# Patient Record
Sex: MALE | Race: WHITE | NOT HISPANIC OR LATINO | Employment: OTHER | URBAN - METROPOLITAN AREA
[De-identification: names, ages, dates, MRNs, and addresses within clinical notes are randomized per-mention and may not be internally consistent; named-entity substitution may affect disease eponyms.]

---

## 2017-04-25 ENCOUNTER — RECORDS - HEALTHEAST (OUTPATIENT)
Dept: LAB | Facility: CLINIC | Age: 82
End: 2017-04-25

## 2017-04-25 LAB
CHOLEST SERPL-MCNC: 163 MG/DL
FASTING STATUS PATIENT QL REPORTED: YES
HDLC SERPL-MCNC: 36 MG/DL
LDLC SERPL CALC-MCNC: 87 MG/DL
TRIGL SERPL-MCNC: 202 MG/DL

## 2017-06-06 ENCOUNTER — OFFICE VISIT - HEALTHEAST (OUTPATIENT)
Dept: RHEUMATOLOGY | Facility: CLINIC | Age: 82
End: 2017-06-06

## 2017-06-06 ENCOUNTER — RECORDS - HEALTHEAST (OUTPATIENT)
Dept: GENERAL RADIOLOGY | Age: 82
End: 2017-06-06

## 2017-06-06 DIAGNOSIS — L40.8 OTHER PSORIASIS: ICD-10-CM

## 2017-06-06 DIAGNOSIS — M15.0 PRIMARY OSTEOARTHRITIS INVOLVING MULTIPLE JOINTS: ICD-10-CM

## 2017-06-06 DIAGNOSIS — M25.50 POLYARTHRALGIA: ICD-10-CM

## 2017-06-06 DIAGNOSIS — M15.0 PRIMARY GENERALIZED (OSTEO)ARTHRITIS: ICD-10-CM

## 2017-06-07 LAB — HCV AB SERPL QL IA: NEGATIVE

## 2017-07-07 ENCOUNTER — AMBULATORY - HEALTHEAST (OUTPATIENT)
Dept: CARDIOLOGY | Facility: CLINIC | Age: 82
End: 2017-07-07

## 2017-07-12 ENCOUNTER — AMBULATORY - HEALTHEAST (OUTPATIENT)
Dept: CARDIOLOGY | Facility: CLINIC | Age: 82
End: 2017-07-12

## 2017-07-12 ENCOUNTER — OFFICE VISIT - HEALTHEAST (OUTPATIENT)
Dept: CARDIOLOGY | Facility: CLINIC | Age: 82
End: 2017-07-12

## 2017-07-12 DIAGNOSIS — I50.31 ACUTE DIASTOLIC CONGESTIVE HEART FAILURE (H): ICD-10-CM

## 2017-07-12 DIAGNOSIS — I48.21 PERMANENT ATRIAL FIBRILLATION (H): ICD-10-CM

## 2017-07-12 DIAGNOSIS — I10 ESSENTIAL HYPERTENSION WITH GOAL BLOOD PRESSURE LESS THAN 140/90: ICD-10-CM

## 2017-07-12 ASSESSMENT — MIFFLIN-ST. JEOR: SCORE: 1538.97

## 2017-09-06 ENCOUNTER — OFFICE VISIT - HEALTHEAST (OUTPATIENT)
Dept: RHEUMATOLOGY | Facility: CLINIC | Age: 82
End: 2017-09-06

## 2017-09-06 DIAGNOSIS — M15.0 PRIMARY OSTEOARTHRITIS INVOLVING MULTIPLE JOINTS: ICD-10-CM

## 2017-09-06 DIAGNOSIS — M25.50 POLYARTHRALGIA: ICD-10-CM

## 2017-09-06 DIAGNOSIS — L40.8 OTHER PSORIASIS: ICD-10-CM

## 2017-09-06 ASSESSMENT — MIFFLIN-ST. JEOR: SCORE: 1538.97

## 2017-09-27 ENCOUNTER — RECORDS - HEALTHEAST (OUTPATIENT)
Dept: ADMINISTRATIVE | Facility: OTHER | Age: 82
End: 2017-09-27

## 2017-09-28 ENCOUNTER — RECORDS - HEALTHEAST (OUTPATIENT)
Dept: ADMINISTRATIVE | Facility: OTHER | Age: 82
End: 2017-09-28

## 2017-10-05 ENCOUNTER — RECORDS - HEALTHEAST (OUTPATIENT)
Dept: ADMINISTRATIVE | Facility: OTHER | Age: 82
End: 2017-10-05

## 2017-10-05 ENCOUNTER — AMBULATORY - HEALTHEAST (OUTPATIENT)
Dept: SURGERY | Facility: CLINIC | Age: 82
End: 2017-10-05

## 2017-10-12 ENCOUNTER — OFFICE VISIT - HEALTHEAST (OUTPATIENT)
Dept: SURGERY | Facility: CLINIC | Age: 82
End: 2017-10-12

## 2017-10-12 DIAGNOSIS — D17.1 LIPOMA OF TORSO: ICD-10-CM

## 2017-10-12 ASSESSMENT — MIFFLIN-ST. JEOR: SCORE: 1523.54

## 2017-12-13 ENCOUNTER — RECORDS - HEALTHEAST (OUTPATIENT)
Dept: LAB | Facility: CLINIC | Age: 82
End: 2017-12-13

## 2017-12-13 LAB
CHOLEST SERPL-MCNC: 146 MG/DL
FASTING STATUS PATIENT QL REPORTED: ABNORMAL
HDLC SERPL-MCNC: 38 MG/DL
LDLC SERPL CALC-MCNC: 79 MG/DL
TRIGL SERPL-MCNC: 147 MG/DL

## 2018-01-04 ENCOUNTER — RECORDS - HEALTHEAST (OUTPATIENT)
Dept: LAB | Facility: CLINIC | Age: 83
End: 2018-01-04

## 2018-01-04 LAB
ANION GAP SERPL CALCULATED.3IONS-SCNC: 11 MMOL/L (ref 5–18)
BUN SERPL-MCNC: 13 MG/DL (ref 8–28)
CALCIUM SERPL-MCNC: 9.2 MG/DL (ref 8.5–10.5)
CHLORIDE BLD-SCNC: 103 MMOL/L (ref 98–107)
CO2 SERPL-SCNC: 28 MMOL/L (ref 22–31)
CREAT SERPL-MCNC: 0.8 MG/DL (ref 0.7–1.3)
GFR SERPL CREATININE-BSD FRML MDRD: >60 ML/MIN/1.73M2
GLUCOSE BLD-MCNC: 84 MG/DL (ref 70–125)
POTASSIUM BLD-SCNC: 4.1 MMOL/L (ref 3.5–5)
SODIUM SERPL-SCNC: 142 MMOL/L (ref 136–145)

## 2018-03-06 ENCOUNTER — OFFICE VISIT - HEALTHEAST (OUTPATIENT)
Dept: RHEUMATOLOGY | Facility: CLINIC | Age: 83
End: 2018-03-06

## 2018-03-06 DIAGNOSIS — M25.50 POLYARTHRALGIA: ICD-10-CM

## 2018-03-06 DIAGNOSIS — M15.0 PRIMARY OSTEOARTHRITIS INVOLVING MULTIPLE JOINTS: ICD-10-CM

## 2018-03-06 ASSESSMENT — MIFFLIN-ST. JEOR: SCORE: 1498.14

## 2018-07-11 ENCOUNTER — RECORDS - HEALTHEAST (OUTPATIENT)
Dept: LAB | Facility: CLINIC | Age: 83
End: 2018-07-11

## 2018-07-11 LAB
ALBUMIN SERPL-MCNC: 3.4 G/DL (ref 3.5–5)
ALP SERPL-CCNC: 83 U/L (ref 45–120)
ALT SERPL W P-5'-P-CCNC: <9 U/L (ref 0–45)
ANION GAP SERPL CALCULATED.3IONS-SCNC: 8 MMOL/L (ref 5–18)
AST SERPL W P-5'-P-CCNC: 15 U/L (ref 0–40)
BILIRUB SERPL-MCNC: 0.4 MG/DL (ref 0–1)
BUN SERPL-MCNC: 18 MG/DL (ref 8–28)
CALCIUM SERPL-MCNC: 9.1 MG/DL (ref 8.5–10.5)
CHLORIDE BLD-SCNC: 108 MMOL/L (ref 98–107)
CHOLEST SERPL-MCNC: 131 MG/DL
CO2 SERPL-SCNC: 25 MMOL/L (ref 22–31)
CREAT SERPL-MCNC: 0.91 MG/DL (ref 0.7–1.3)
FASTING STATUS PATIENT QL REPORTED: ABNORMAL
GFR SERPL CREATININE-BSD FRML MDRD: >60 ML/MIN/1.73M2
GLUCOSE BLD-MCNC: 104 MG/DL (ref 70–125)
HDLC SERPL-MCNC: 36 MG/DL
LDLC SERPL CALC-MCNC: 65 MG/DL
POTASSIUM BLD-SCNC: 4.5 MMOL/L (ref 3.5–5)
PROT SERPL-MCNC: 6.7 G/DL (ref 6–8)
SODIUM SERPL-SCNC: 141 MMOL/L (ref 136–145)
TRIGL SERPL-MCNC: 148 MG/DL

## 2018-08-10 ENCOUNTER — COMMUNICATION - HEALTHEAST (OUTPATIENT)
Dept: ADMINISTRATIVE | Facility: CLINIC | Age: 83
End: 2018-08-10

## 2018-08-30 ENCOUNTER — RECORDS - HEALTHEAST (OUTPATIENT)
Dept: ADMINISTRATIVE | Facility: OTHER | Age: 83
End: 2018-08-30

## 2018-08-30 ENCOUNTER — AMBULATORY - HEALTHEAST (OUTPATIENT)
Dept: CARDIOLOGY | Facility: CLINIC | Age: 83
End: 2018-08-30

## 2018-09-05 ENCOUNTER — OFFICE VISIT - HEALTHEAST (OUTPATIENT)
Dept: CARDIOLOGY | Facility: CLINIC | Age: 83
End: 2018-09-05

## 2018-09-05 DIAGNOSIS — I10 HYPERTENSION, ESSENTIAL: ICD-10-CM

## 2018-09-05 DIAGNOSIS — R42 DIZZINESS: ICD-10-CM

## 2018-09-05 DIAGNOSIS — I48.21 PERMANENT ATRIAL FIBRILLATION (H): ICD-10-CM

## 2018-09-05 DIAGNOSIS — I50.31 ACUTE DIASTOLIC CONGESTIVE HEART FAILURE (H): ICD-10-CM

## 2018-09-05 ASSESSMENT — MIFFLIN-ST. JEOR: SCORE: 1507.21

## 2019-02-27 ENCOUNTER — RECORDS - HEALTHEAST (OUTPATIENT)
Dept: LAB | Facility: CLINIC | Age: 84
End: 2019-02-27

## 2019-02-27 LAB
ANION GAP SERPL CALCULATED.3IONS-SCNC: 7 MMOL/L (ref 5–18)
BUN SERPL-MCNC: 15 MG/DL (ref 8–28)
CALCIUM SERPL-MCNC: 9.5 MG/DL (ref 8.5–10.5)
CHLORIDE BLD-SCNC: 103 MMOL/L (ref 98–107)
CHOLEST SERPL-MCNC: 132 MG/DL
CO2 SERPL-SCNC: 28 MMOL/L (ref 22–31)
CREAT SERPL-MCNC: 1 MG/DL (ref 0.7–1.3)
CREAT UR-MCNC: 76.5 MG/DL
FASTING STATUS PATIENT QL REPORTED: ABNORMAL
GFR SERPL CREATININE-BSD FRML MDRD: >60 ML/MIN/1.73M2
GLUCOSE BLD-MCNC: 89 MG/DL (ref 70–125)
HDLC SERPL-MCNC: 37 MG/DL
LDLC SERPL CALC-MCNC: 74 MG/DL
MAGNESIUM SERPL-MCNC: 2.2 MG/DL (ref 1.8–2.6)
MICROALBUMIN UR-MCNC: 2.05 MG/DL (ref 0–1.99)
MICROALBUMIN/CREAT UR: 26.8 MG/G
POTASSIUM BLD-SCNC: 4.6 MMOL/L (ref 3.5–5)
SODIUM SERPL-SCNC: 138 MMOL/L (ref 136–145)
TRIGL SERPL-MCNC: 107 MG/DL

## 2019-10-02 ENCOUNTER — RECORDS - HEALTHEAST (OUTPATIENT)
Dept: LAB | Facility: CLINIC | Age: 84
End: 2019-10-02

## 2019-10-02 LAB
ANION GAP SERPL CALCULATED.3IONS-SCNC: 5 MMOL/L (ref 5–18)
BUN SERPL-MCNC: 16 MG/DL (ref 8–28)
CALCIUM SERPL-MCNC: 9.7 MG/DL (ref 8.5–10.5)
CHLORIDE BLD-SCNC: 105 MMOL/L (ref 98–107)
CHOLEST SERPL-MCNC: 148 MG/DL
CO2 SERPL-SCNC: 29 MMOL/L (ref 22–31)
CREAT SERPL-MCNC: 0.94 MG/DL (ref 0.7–1.3)
FASTING STATUS PATIENT QL REPORTED: NORMAL
GFR SERPL CREATININE-BSD FRML MDRD: >60 ML/MIN/1.73M2
GLUCOSE BLD-MCNC: 84 MG/DL (ref 70–125)
HDLC SERPL-MCNC: 42 MG/DL
LDLC SERPL CALC-MCNC: 76 MG/DL
POTASSIUM BLD-SCNC: 4.9 MMOL/L (ref 3.5–5)
SODIUM SERPL-SCNC: 139 MMOL/L (ref 136–145)
TRIGL SERPL-MCNC: 149 MG/DL

## 2020-05-21 ENCOUNTER — RECORDS - HEALTHEAST (OUTPATIENT)
Dept: LAB | Facility: CLINIC | Age: 85
End: 2020-05-21

## 2020-05-21 LAB
ANION GAP SERPL CALCULATED.3IONS-SCNC: 9 MMOL/L (ref 5–18)
BUN SERPL-MCNC: 16 MG/DL (ref 8–28)
CALCIUM SERPL-MCNC: 9.2 MG/DL (ref 8.5–10.5)
CHLORIDE BLD-SCNC: 105 MMOL/L (ref 98–107)
CHOLEST SERPL-MCNC: 139 MG/DL
CO2 SERPL-SCNC: 26 MMOL/L (ref 22–31)
CREAT SERPL-MCNC: 0.94 MG/DL (ref 0.7–1.3)
FASTING STATUS PATIENT QL REPORTED: ABNORMAL
GFR SERPL CREATININE-BSD FRML MDRD: >60 ML/MIN/1.73M2
GLUCOSE BLD-MCNC: 82 MG/DL (ref 70–125)
HDLC SERPL-MCNC: 39 MG/DL
LDLC SERPL CALC-MCNC: 75 MG/DL
POTASSIUM BLD-SCNC: 4.5 MMOL/L (ref 3.5–5)
SODIUM SERPL-SCNC: 140 MMOL/L (ref 136–145)
TRIGL SERPL-MCNC: 127 MG/DL

## 2020-12-22 ENCOUNTER — RECORDS - HEALTHEAST (OUTPATIENT)
Dept: LAB | Facility: CLINIC | Age: 85
End: 2020-12-22

## 2020-12-22 LAB
ANION GAP SERPL CALCULATED.3IONS-SCNC: 8 MMOL/L (ref 5–18)
BUN SERPL-MCNC: 14 MG/DL (ref 8–28)
CALCIUM SERPL-MCNC: 8.8 MG/DL (ref 8.5–10.5)
CHLORIDE BLD-SCNC: 106 MMOL/L (ref 98–107)
CHOLEST SERPL-MCNC: 139 MG/DL
CO2 SERPL-SCNC: 27 MMOL/L (ref 22–31)
CREAT SERPL-MCNC: 0.77 MG/DL (ref 0.7–1.3)
CREAT UR-MCNC: 128.3 MG/DL
FASTING STATUS PATIENT QL REPORTED: ABNORMAL
GFR SERPL CREATININE-BSD FRML MDRD: >60 ML/MIN/1.73M2
GLUCOSE BLD-MCNC: 92 MG/DL (ref 70–125)
HDLC SERPL-MCNC: 37 MG/DL
LDLC SERPL CALC-MCNC: 82 MG/DL
MICROALBUMIN UR-MCNC: 14.86 MG/DL (ref 0–1.99)
MICROALBUMIN/CREAT UR: 115.8 MG/G
POTASSIUM BLD-SCNC: 4.3 MMOL/L (ref 3.5–5)
SODIUM SERPL-SCNC: 141 MMOL/L (ref 136–145)
TRIGL SERPL-MCNC: 102 MG/DL

## 2021-01-20 ENCOUNTER — AMBULATORY - HEALTHEAST (OUTPATIENT)
Dept: CARDIOLOGY | Facility: CLINIC | Age: 86
End: 2021-01-20

## 2021-01-20 ENCOUNTER — RECORDS - HEALTHEAST (OUTPATIENT)
Dept: ADMINISTRATIVE | Facility: OTHER | Age: 86
End: 2021-01-20

## 2021-01-25 ENCOUNTER — COMMUNICATION - HEALTHEAST (OUTPATIENT)
Dept: CARDIOLOGY | Facility: CLINIC | Age: 86
End: 2021-01-25

## 2021-01-26 ENCOUNTER — OFFICE VISIT - HEALTHEAST (OUTPATIENT)
Dept: CARDIOLOGY | Facility: CLINIC | Age: 86
End: 2021-01-26

## 2021-01-26 DIAGNOSIS — I48.21 PERMANENT ATRIAL FIBRILLATION (H): ICD-10-CM

## 2021-01-26 DIAGNOSIS — I67.89 ACUTE, BUT ILL-DEFINED, CEREBROVASCULAR DISEASE: ICD-10-CM

## 2021-01-26 DIAGNOSIS — I10 HYPERTENSION, ESSENTIAL: ICD-10-CM

## 2021-01-26 DIAGNOSIS — R00.1 BRADYCARDIA: ICD-10-CM

## 2021-01-26 DIAGNOSIS — I50.31 ACUTE DIASTOLIC CONGESTIVE HEART FAILURE (H): ICD-10-CM

## 2021-01-26 ASSESSMENT — MIFFLIN-ST. JEOR: SCORE: 1502.68

## 2021-01-27 LAB
ATRIAL RATE - MUSE: 38 BPM
DIASTOLIC BLOOD PRESSURE - MUSE: NORMAL
INTERPRETATION ECG - MUSE: NORMAL
P AXIS - MUSE: NORMAL
PR INTERVAL - MUSE: NORMAL
QRS DURATION - MUSE: 104 MS
QT - MUSE: 474 MS
QTC - MUSE: 410 MS
R AXIS - MUSE: 46 DEGREES
SYSTOLIC BLOOD PRESSURE - MUSE: NORMAL
T AXIS - MUSE: 49 DEGREES
VENTRICULAR RATE- MUSE: 45 BPM

## 2021-05-30 ENCOUNTER — RECORDS - HEALTHEAST (OUTPATIENT)
Dept: ADMINISTRATIVE | Facility: CLINIC | Age: 86
End: 2021-05-30

## 2021-05-31 ENCOUNTER — RECORDS - HEALTHEAST (OUTPATIENT)
Dept: ADMINISTRATIVE | Facility: CLINIC | Age: 86
End: 2021-05-31

## 2021-05-31 VITALS — WEIGHT: 193.6 LBS | HEIGHT: 69 IN | BODY MASS INDEX: 28.68 KG/M2

## 2021-05-31 VITALS — HEIGHT: 69 IN | WEIGHT: 197 LBS | BODY MASS INDEX: 29.18 KG/M2

## 2021-06-01 VITALS — BODY MASS INDEX: 27.85 KG/M2 | WEIGHT: 188 LBS | HEIGHT: 69 IN

## 2021-06-02 VITALS — WEIGHT: 190 LBS | BODY MASS INDEX: 28.14 KG/M2 | HEIGHT: 69 IN

## 2021-06-03 ENCOUNTER — RECORDS - HEALTHEAST (OUTPATIENT)
Dept: ADMINISTRATIVE | Facility: CLINIC | Age: 86
End: 2021-06-03

## 2021-06-05 VITALS
HEIGHT: 69 IN | HEART RATE: 46 BPM | WEIGHT: 189 LBS | SYSTOLIC BLOOD PRESSURE: 158 MMHG | BODY MASS INDEX: 27.99 KG/M2 | DIASTOLIC BLOOD PRESSURE: 70 MMHG | RESPIRATION RATE: 10 BRPM

## 2021-06-11 NOTE — PROGRESS NOTES
[facility] Follow-up Note    Assessment & Plan        1. Permanent atrial fibrillation-permanent, not valvular, and asymptomatic.  INR adjusted by primary clinic and most recently 2.7.      2. Acute diastolic congestive heart failure -heart failure in the setting of preserved ejection fraction and no signs or symptoms currently or in the last year.     3. Essential hypertension with goal blood pressure less than 140/90 -under good control although he is on 7 agents including amlodipine, finasteride, lisinopril, metoprolol, spironolactone, tamsulosin as well as Demadex.  Given his age would like his blood pressure drift up slightly and might suggest backing off on amlodipine in future.     4.  Lightheadedness-resolved.  5.  Hypercholesterolemia- according to records on simvastatin as well as Slo-Niacin and given his age would discontinue Slo-Niacin.  Cholesterol is 163 with an LDL of 87 from April of this year.    Plan  1.  Discuss with primary but would discontinue Slo-Niacin.  2.  If starts getting orthostatic back off in some of blood pressure pills  3.  Follow-up with me in 1 year or sooner if needed.    Subjective  CC: 87-year-old white gentleman here for yearly follow-up today.  He complains of increased shortness of breath and very heavy activity.  Otherwise there is no syncope, dizziness, fatigue, chest discomfort, palpitations, PND, orthopnea or peripheral edema.    Medications  Current Outpatient Prescriptions   Medication Sig Note     amLODIPine (NORVASC) 2.5 MG tablet Take 2.5 mg by mouth daily.      bifidobacterium infantis (ALIGN) 4 mg cap Take 1 capsule by mouth daily.      docusate sodium (STOOL SOFTENER) 100 mg capsule Take 200 mg by mouth daily with supper.       finasteride (PROSCAR) 5 mg tablet Take 5 mg by mouth daily. As directed      lisinopril (PRINIVIL,ZESTRIL) 20 MG tablet Take 20 mg by mouth daily.      metoprolol tartrate (LOPRESSOR) 25 MG tablet 50 mg 2 (two) times a  "day.  2/3/2016: Received from: External Pharmacy     primidone (MYSOLINE) 50 MG tablet Take 150 mg by mouth bedtime.  12/11/2015: .     simvastatin (ZOCOR) 40 MG tablet Take 40 mg by mouth bedtime.      spironolactone (ALDACTONE) 25 MG tablet Take 25 mg by mouth daily.      tamsulosin (FLOMAX) 0.4 mg Cp24 Take 1 capsule by mouth daily after supper.  12/11/2015: .     torsemide (DEMADEX) 20 MG tablet Take 20 mg by mouth daily. 7/18/2016: Received from: External Pharmacy     warfarin (COUMADIN) 5 MG tablet Take 5 mg by mouth. Daily.  Adjust dose as directed. 12/11/2015: Takes in the morning.       Objective  /70 (Patient Site: Left Arm, Patient Position: Sitting, Cuff Size: Adult Large)  Pulse 64  Resp 18  Ht 5' 9\" (1.753 m)  Wt 197 lb (89.4 kg)  BMI 29.09 kg/m2    General Appearance:    Alert, cooperative, no distress, appears stated age   Head:    Normocephalic, without obvious abnormality, atraumatic   Throat:   Lips, mucosa, and tongue normal; teeth and gums normal   Neck:   Supple, symmetrical, trachea midline, no adenopathy;        thyroid:  No enlargement/tenderness/nodules; no carotid    bruit or JVD   Back:     Symmetric, no curvature, ROM normal, no CVA tenderness   Lungs:     Clear to auscultation bilaterally, respirations unlabored   Chest wall:    No tenderness or deformity   Heart:   irregularly irregular, S1 and S2 normal, no murmur, rub   or gallop   Abdomen:     Soft, non-tender, bowel sounds active all four quadrants,     no masses, no organomegaly   Extremities:   Normal, atraumatic, no cyanosis or edema   Pulses:   2+ and symmetric all extremities   Skin:   Skin color, texture, turgor normal, no rashes or lesions     Results    Lab Results personally reviewed   Lab Results   Component Value Date    CHOL 163 04/25/2017    CHOL 149 07/27/2016     Lab Results   Component Value Date    HDL 36 (L) 04/25/2017    HDL 36 (L) 07/27/2016     Lab Results   Component Value Date    LDLCALC 87 " 04/25/2017    LDLCALC 73 07/27/2016     Lab Results   Component Value Date    TRIG 202 (H) 04/25/2017    TRIG 198 (H) 07/27/2016     Lab Results   Component Value Date    WBC 9.8 12/14/2015    HGB 15.1 12/14/2015    HCT 46.5 12/14/2015     12/14/2015     Lab Results   Component Value Date    CREATININE 0.95 04/25/2017    BUN 19 04/25/2017     04/25/2017    K 4.2 04/25/2017    CO2 27 04/25/2017     Review of Systems:   General: WNL  Eyes: WNL  Ears/Nose/Throat: WNL  Lungs: Shortness of Breath  Heart: Shortness of Breath with activity  Stomach: WNL  Bladder: WNL  Muscle/Joints: WNL  Skin: WNL  Nervous System: Dizziness, Loss of Balance  Mental Health: WNL     Blood: WNL

## 2021-06-11 NOTE — PROGRESS NOTES
ASSESSMENT AND PLAN:  Carlos Rubio 87 y.o. male is seen here on 06/06/17 for evaluation of polyarthralgias, that he has osteoarthritis is diagnostically straightforward, he has some psoriasis.  The key question of whether he has psoriatic arthritis is one that he is here for.  He does not appear that he has evidence of inflammatory arthropathy.  He does not have dactylitis, enthesitis, inflammatory back pain such as spondylitis or history of ocular inflammation.  Whether he had these findings when he was on Humira is .  He has mild elevation of sed rate.  We will recheck this.  We will take x-rays of the hands.  On today's evaluation the likelihood that he has psoriatic arthritis and 1 of the domains of psoriatic arthritis appears to be remote.  I have asked him to return for follow-up in 3 months.  Diagnoses and all orders for this visit:    Polyarthralgia  -     Cancel: CCP Antibodies  -     Cancel: Rheumatoid Factor Quant  -     Hepatitis C Antibody (Anti-HCV)  -     Erythrocyte Sedimentation Rate  -     C-Reactive Protein    Psoriasis  -     XR Hands Bilateral 3 or More VWS; Future; Expected date: 6/6/17    Primary osteoarthritis involving multiple joints  -     XR Hands Bilateral 3 or More VWS; Future; Expected date: 6/6/17      HISTORY OF PRESENTING ILLNESS:  Carlos Rubio, 87 y.o., male is here for evaluation of painful joints, prior diagnosis of psoriatic arthritis, being on Humira, originally presented to rheumatology for years ago with bilateral temporal artery artery area pain resulting in biopsies and imaging including MRI without evidence of temporal arteritis.  He has over time been off the Humira.  He was not sure that was doing much for him.  Currently he noted the most of his pain is in his hands in the in the PIPs and the DIP areas.  This pain does not trouble him as long as he is at rest.  When he moves or uses his joints such as the hands that is when he would get the discomfort and then 2 he  rates it as mild.  Often Tylenol will take care of this pain.  He has not observed swelling in any of her joints.  His morning stiffness is minimal at 5 minutes.  He has psoriasis on his elbows knees buttock areas.  There is no family history of rheumatoid arthritis, ankylosing spondylitis.  He has not had features suggestive of uveitis.  He gets back pain that response to exercise.  He retired from [employer], warehouse work, he is non-smoker.   Several his comorbidities are reviewed.    Further historical information and ADL limitations as noted in the multidimensional health assessment questionnaire attached in the EMR. Rest of the 13 system ROS is negative.     ALLERGIES:Sulfamethoxazole-trimethoprim    PAST MEDICAL/ACTIVE PROBLEMS/MEDICATION/ FAMILY HISTORY/SOCIAL DATA:  The patient has a family history of  Past Medical History:   Diagnosis Date     A-fib     on coumadin     ARF (acute renal failure) 1979    States both his kidneys quit. Was going to start dialysis when they started working again.     Arthritis      Basal cell carcinoma      Carpal tunnel syndrome      Diabetes     MUSA SAID HE DOES NOT HAVE DIABETES 12/11/15.     Heart failure      HTN (hypertension)      Psoriasis      Psoriatic arthritis      Sicca syndrome     MUSA SAID HE DOES NOT HAVE/NEVER HAD SICCA SYNDROME 12/11/15.     History   Smoking Status     Former Smoker     Packs/day: 0.50     Years: 20.00     Quit date: 1/1/1975   Smokeless Tobacco     Never Used     Comment: Smoked a pipe from 1951 to about 1975 (or maybe 1973 - he can't remember)     Patient Active Problem List   Diagnosis     Carpal Tunnel Syndrome     Neck Pain     Headache     Lightheadedness     Unspecified fall     Atrial Fibrillation     Dissection Of The Right Vertebral Artery     Chronic Sinusitis     Sicca Syndrome     Arthritis     Psoriasis     Fatigue     Stroke Syndrome     Immunology Studies Nonspecific Abnormal Findings     Osteopenia     Dizziness      Diabetes     Hx of basal cell carcinoma     Acute diastolic congestive heart failure     Hypertension, essential     Osteoarthritis     Primary osteoarthritis involving multiple joints     Polyarthralgia     Current Outpatient Prescriptions   Medication Sig Dispense Refill     amLODIPine (NORVASC) 2.5 MG tablet Take 2.5 mg by mouth daily.       bifidobacterium infantis (ALIGN) 4 mg cap Take 1 capsule by mouth daily.       cyclobenzaprine (FLEXERIL) 5 MG tablet Take 5 mg by mouth as needed for muscle spasms.        docusate sodium (STOOL SOFTENER) 100 mg capsule Take 200 mg by mouth daily with supper.        finasteride (PROSCAR) 5 mg tablet Take 5 mg by mouth daily. As directed       lisinopril (PRINIVIL,ZESTRIL) 20 MG tablet Take 20 mg by mouth daily.       lisinopril (PRINIVIL,ZESTRIL) 40 MG tablet 20 mg.        metoprolol tartrate (LOPRESSOR) 25 MG tablet        primidone (MYSOLINE) 50 MG tablet Take 150 mg by mouth bedtime.        simvastatin (ZOCOR) 40 MG tablet Take 40 mg by mouth bedtime.       spironolactone (ALDACTONE) 25 MG tablet Take 25 mg by mouth daily.       tamsulosin (FLOMAX) 0.4 mg Cp24 Take 1 capsule by mouth daily after supper.        torsemide (DEMADEX) 20 MG tablet TK 1 AND 1/2 TS PO QAM  3     warfarin (COUMADIN) 5 MG tablet Take 5 mg by mouth. Daily.  Adjust dose as directed.       No current facility-administered medications for this visit.        COMPREHENSIVE EXAMINATION:  There were no vitals filed for this visit.  A well appearing alert oriented male. Vital data as noted above. His eyes without inflammation/scleromalacia. ENTwithout oral mucositis, thrush, nasal deformity, external ear redness, deformity. His neck is without lymphadenopathy and supple. Lungs normal sounds, no pleural rub. Heart auscultation normal rate, rhythm; no pericardial rub and murmurs. Abdomen soft, non tender, no organomegaly. Skin examined for heliotrope, malar area eruption, lupus pernio, periungual erythema,  sclerodactyly, papules, erythema nodosum, purpura, nail pitting, onycholysis, and obvious psoriasis lesion. Neurological examination shows normal alertness, speech, facial symmetry, tone and power in upper and lower extremities, Tinel's and Phalen's at wrist and gait. The joint examination is performed for swelling, tenderness, warmth, erythema, and range of motion in the following joints: DIPs, PIPs, MCPs, wrists, first CMC's, elbows, shoulders, hips, knees, ankles, feet; spine for range of motion and paraspinal muscles for tenderness. The salient normal / abnormal findings are appended.  He does not have dactylitis, enthesitis, or synovitis of any of his palpable appendicular joints he has Heberden's and Nella's.  Mild JLT of the knees.  He ambulates with the help of a cane.  His nails do not show significant dystrophy, Onikul lysis of nail pitting.    LAB / IMAGING DATA:  ALT   Date Value Ref Range Status   08/05/2015 14 0 - 45 U/L Final   07/08/2015 14 0 - 45 U/L Final   05/05/2015 15 0 - 45 U/L Final     Albumin   Date Value Ref Range Status   08/05/2015 3.6 3.5 - 5.0 g/dL Final   07/08/2015 3.4 (L) 3.5 - 5.0 g/dL Final   05/05/2015 3.7 3.5 - 5.0 g/dL Final     Creatinine   Date Value Ref Range Status   04/25/2017 0.95 0.70 - 1.30 mg/dL Final   07/27/2016 0.99 0.70 - 1.30 mg/dL Final   01/13/2016 0.82 0.70 - 1.30 mg/dL Final       WBC   Date Value Ref Range Status   12/14/2015 9.8 4.0 - 11.0 thou/uL Final   12/13/2015 10.5 4.0 - 11.0 thou/uL Final     Hemoglobin   Date Value Ref Range Status   12/14/2015 15.1 14.0 - 18.0 g/dL Final   12/13/2015 14.5 14.0 - 18.0 g/dL Final   12/12/2015 14.6 14.0 - 18.0 g/dL Final     Platelets   Date Value Ref Range Status   12/14/2015 199 140 - 440 thou/uL Final   12/13/2015 189 140 - 440 thou/uL Final   12/12/2015 183 140 - 440 thou/uL Final       Lab Results   Component Value Date    SEDRATE 32 (H) 04/25/2017

## 2021-06-12 NOTE — PROGRESS NOTES
ASSESSMENT AND PLAN:  Carlos Rubio 87 y.o. male is seen here on 09/06/17 for evaluation of polyarthralgias, he has osteoarthritis, psoriasis.  The likelihood that this patient has psoriatic arthritis is discussed is remote.  he finds acetaminophen quite helpful.  I have asked him to continue that.  He is to return here for follow-up on as-needed basis.  Diagnoses and all orders for this visit:    Primary osteoarthritis involving multiple joints    Psoriasis    Polyarthralgia      HISTORY OF PRESENTING ILLNESS:  Carlos Rubio, 87 y.o., male is here for evaluation of painful joints, prior diagnosis of psoriatic arthritis, being on Humira, originally presented to rheumatology for years ago with bilateral temporal artery artery area pain resulting in biopsies and imaging including MRI without evidence of temporal arteritis.  He has over time been off the Humira.  He was not sure that was doing much for him.  Currently he noted the most of his pain is in his hands in the in the PIPs and the DIP areas.  This pain does not trouble him as long as he is at rest.  When he moves or uses his joints such as the hands that is when he would get the discomfort and then 2 he rates it as mild.  Often Tylenol will take care of this pain.  He has not observed swelling in any of her joints.  His morning stiffness is minimal at 5 minutes.  He has psoriasis on his elbows knees buttock areas.  There is no family history of rheumatoid arthritis, ankylosing spondylitis.  He has not had features suggestive of uveitis.  He gets back pain that response to exercise.  He retired from [employer], warehouse work, he is non-smoker.   Several his comorbidities are reviewed.    Further historical information and ADL limitations as noted in the multidimensional health assessment questionnaire attached in the EMR. Rest of the 13 system ROS is negative.     ALLERGIES:Sulfamethoxazole-trimethoprim    PAST MEDICAL/ACTIVE PROBLEMS/MEDICATION/ FAMILY  HISTORY/SOCIAL DATA:  The patient has a family history of  Past Medical History:   Diagnosis Date     A-fib     on coumadin     ARF (acute renal failure) 1979    States both his kidneys quit. Was going to start dialysis when they started working again.     Arthritis      Basal cell carcinoma      Carpal tunnel syndrome      Diabetes     MUSA SAID HE DOES NOT HAVE DIABETES 12/11/15.     Heart failure      HTN (hypertension)      Psoriasis      Psoriatic arthritis      Sicca syndrome     MUSA SAID HE DOES NOT HAVE/NEVER HAD SICCA SYNDROME 12/11/15.     History   Smoking Status     Former Smoker     Packs/day: 0.50     Years: 20.00     Quit date: 1/1/1975   Smokeless Tobacco     Never Used     Comment: Smoked a pipe from 1951 to about 1975 (or maybe 1973 - he can't remember)     Patient Active Problem List   Diagnosis     Carpal Tunnel Syndrome     Neck Pain     Headache     Lightheadedness     Unspecified fall     Atrial Fibrillation     Dissection Of The Right Vertebral Artery     Chronic Sinusitis     Sicca Syndrome     Arthritis     Psoriasis     Fatigue     Stroke Syndrome     Immunology Studies Nonspecific Abnormal Findings     Osteopenia     Dizziness     Diabetes     Hx of basal cell carcinoma     Acute diastolic congestive heart failure     Hypertension, essential     Osteoarthritis     Primary osteoarthritis involving multiple joints     Polyarthralgia     Current Outpatient Prescriptions   Medication Sig Dispense Refill     amLODIPine (NORVASC) 2.5 MG tablet Take 2.5 mg by mouth daily.       bifidobacterium infantis (ALIGN) 4 mg cap Take 1 capsule by mouth daily.       docusate sodium (STOOL SOFTENER) 100 mg capsule Take 200 mg by mouth daily with supper.        finasteride (PROSCAR) 5 mg tablet Take 5 mg by mouth daily. As directed       lisinopril (PRINIVIL,ZESTRIL) 20 MG tablet Take 20 mg by mouth daily.       metoprolol tartrate (LOPRESSOR) 25 MG tablet 50 mg 2 (two) times a day.        primidone  "(MYSOLINE) 50 MG tablet Take 150 mg by mouth bedtime.        simvastatin (ZOCOR) 40 MG tablet Take 40 mg by mouth bedtime.       spironolactone (ALDACTONE) 25 MG tablet Take 25 mg by mouth daily.       tamsulosin (FLOMAX) 0.4 mg Cp24 Take 1 capsule by mouth daily after supper.        torsemide (DEMADEX) 20 MG tablet Take 20 mg by mouth daily.  3     warfarin (COUMADIN) 5 MG tablet Take 5 mg by mouth. Daily.  Adjust dose as directed.       No current facility-administered medications for this visit.      DETAILED EXAMINATION  09/06/17  :  Vitals:    09/06/17 1422   BP: 112/56   Patient Site: Left Arm   Patient Position: Sitting   Cuff Size: Adult Regular   Pulse: 60   Weight: 197 lb (89.4 kg)   Height: 5' 9\" (1.753 m)     Alert oriented. Head including the face is examined for malar rash, heliotropes, scarring, lupus pernio. Eyes examined for redness such as in episcleritis/scleritis, periorbital lesions.   Neck examined  for lymph nodes, range of motion Both upper and lower extremities (all four) examined for swollen, warm &/or  tender joints, range of motion, rash, muscle weakness, edema. The salient normal / abnormal findings are appended.     He does not have dactylitis, enthesitis, or synovitis of any of his palpable appendicular joints he has Heberden's and Nella's.  Mild JLT of the knees.  He ambulates with the help of a cane.  His nails do not show significant dystrophy, onycholysis or nail pitting.  LAB / IMAGING DATA:  ALT   Date Value Ref Range Status   08/05/2015 14 0 - 45 U/L Final   07/08/2015 14 0 - 45 U/L Final   05/05/2015 15 0 - 45 U/L Final     Albumin   Date Value Ref Range Status   08/05/2015 3.6 3.5 - 5.0 g/dL Final   07/08/2015 3.4 (L) 3.5 - 5.0 g/dL Final   05/05/2015 3.7 3.5 - 5.0 g/dL Final     Creatinine   Date Value Ref Range Status   04/25/2017 0.95 0.70 - 1.30 mg/dL Final   07/27/2016 0.99 0.70 - 1.30 mg/dL Final   01/13/2016 0.82 0.70 - 1.30 mg/dL Final       WBC   Date Value Ref " Range Status   12/14/2015 9.8 4.0 - 11.0 thou/uL Final   12/13/2015 10.5 4.0 - 11.0 thou/uL Final     Hemoglobin   Date Value Ref Range Status   12/14/2015 15.1 14.0 - 18.0 g/dL Final   12/13/2015 14.5 14.0 - 18.0 g/dL Final   12/12/2015 14.6 14.0 - 18.0 g/dL Final     Platelets   Date Value Ref Range Status   12/14/2015 199 140 - 440 thou/uL Final   12/13/2015 189 140 - 440 thou/uL Final   12/12/2015 183 140 - 440 thou/uL Final       Lab Results   Component Value Date    SEDRATE 28 (H) 06/06/2017

## 2021-06-13 NOTE — PROGRESS NOTES
HPI: Musa Rubio is a 87 y.o. male referred to see me by Kyree Bojorquez MD for evaluation of a right axillary mass.  He notes  a several month long history of intermittent pains in his right axilla, consisting of a needle like prick that occurs spontaneously 2-3 times per day.  Approximately 4 weeks ago, he noted a lump in his armpit after 1 of these painful episodes.  He does not describe any change in size of this axillary lump.  Other than the occasional brief sharp pain, does not cause him any problems.  He denies any recent weight gain or loss.  No lumps or bumps elsewhere on his body.  Denies any night sweats.      As part of his evaluation, he underwent an ultrasound the right axilla, with findings consistent with a lipoma.    Allergies:Sulfamethoxazole-trimethoprim    Past Medical History:   Diagnosis Date     A-fib     on coumadin     ARF (acute renal failure) 1979    States both his kidneys quit. Was going to start dialysis when they started working again.     Arthritis      Basal cell carcinoma      Carpal tunnel syndrome      Diabetes     MUSA SAID HE DOES NOT HAVE DIABETES 12/11/15.     Heart failure      HTN (hypertension)      Psoriasis      Psoriatic arthritis      Sicca syndrome     MUSA SAID HE DOES NOT HAVE/NEVER HAD SICCA SYNDROME 12/11/15.       Past Surgical History:   Procedure Laterality Date     CARPAL TUNNEL RELEASE Left      KNEE ARTHROSCOPY         CURRENT MEDS:    Current Outpatient Prescriptions:      amLODIPine (NORVASC) 2.5 MG tablet, Take 2.5 mg by mouth daily., Disp: , Rfl:      bifidobacterium infantis (ALIGN) 4 mg cap, Take 1 capsule by mouth daily., Disp: , Rfl:      docusate sodium (STOOL SOFTENER) 100 mg capsule, Take 200 mg by mouth daily with supper. , Disp: , Rfl:      finasteride (PROSCAR) 5 mg tablet, Take 5 mg by mouth daily. As directed, Disp: , Rfl:      lisinopril (PRINIVIL,ZESTRIL) 20 MG tablet, Take 20 mg by mouth daily., Disp: , Rfl:      metoprolol  "tartrate (LOPRESSOR) 25 MG tablet, 50 mg 2 (two) times a day. , Disp: , Rfl:      primidone (MYSOLINE) 50 MG tablet, Take 150 mg by mouth bedtime. , Disp: , Rfl:      simvastatin (ZOCOR) 40 MG tablet, Take 40 mg by mouth bedtime., Disp: , Rfl:      spironolactone (ALDACTONE) 25 MG tablet, Take 25 mg by mouth daily., Disp: , Rfl:      tamsulosin (FLOMAX) 0.4 mg Cp24, Take 1 capsule by mouth daily after supper. , Disp: , Rfl:      torsemide (DEMADEX) 20 MG tablet, Take 20 mg by mouth daily., Disp: , Rfl: 3     warfarin (COUMADIN) 5 MG tablet, Take 5 mg by mouth. Daily.  Adjust dose as directed., Disp: , Rfl:     Family History   Problem Relation Age of Onset     Stroke Mother 76     Stroke Father 81     Pacemaker Brother 82        reports that he quit smoking about 42 years ago. He has a 10.00 pack-year smoking history. He has never used smokeless tobacco. He reports that he drinks about 0.6 oz of alcohol per week  He reports that he does not use illicit drugs.    Review of Systems:  The 10 point review of systems  is within normal limits except for as mentioned above in the HPI.  General ROS: No complaints or constitutional symptoms  Skin: As noted in HPI  Hematologic/Lymphatic: As noted in HPI  Psychiatric: No symptoms or complaints  Endocrine: No excessive fatigue, no hypermetabolic symptoms reported  Respiratory ROS: no cough, shortness of breath, or wheezing  Cardiovascular ROS: no chest pain or dyspnea on exertion  Gastrointestinal ROS: No abdominal discomfort reported  Musculoskeletal ROS: no recent injuries reported  Neurological ROS: no focal neurologic defects reported.        /70 (Patient Site: Left Arm, Patient Position: Sitting, Cuff Size: Adult Regular)  Pulse (!) 49  Ht 5' 9\" (1.753 m)  Wt 193 lb 9.6 oz (87.8 kg)  SpO2 97%  BMI 28.59 kg/m2  Body mass index is 28.59 kg/(m^2).    EXAM:  General : Alert, cooperative, appears stated age   Skin: Skin color, texture, turgor normal, no rashes or " lesions.  With the patient in a seated position with his right arm over his head, there is a visible lump in the axilla.  On palpation, this is soft, easily mobile, and not fixed to any underlying structures.  Proximally 4-5 cm in diameter  Lymphatic: No obvious adenopathy, no swelling in bilateral axilla, anterior or posterior cervical chains  Eyes: No scleral icterus, pupils equal  HENT: no traumatic injury to the head or face, no gross abnormalities  Lungs: Normal respiratory effort, breath sounds equal bilaterally  Heart: Regular rate and rhythm  Abdomen: Soft, nondistended.  Musculoskeletal: No obvious swelling,  Neurologic: Grossly intact      LABS:  Lab Results   Component Value Date    WBC 9.8 12/14/2015    HGB 15.1 12/14/2015    HCT 46.5 12/14/2015    MCV 92 12/14/2015     12/14/2015     INR/Prothrombin Time      Lab Results   Component Value Date    ALT 14 08/05/2015    AST 21 08/05/2015    ALKPHOS 65 08/05/2015    BILITOT 0.6 08/05/2015       IMAGES:   Relevant images were reviewed and discussed with the patient.  Notable findings were: From ultrasound imaging obtained September 28, 2017.  A 4 by 2 x 2 centimeter mass in the axilla is noted, slightly hyperechoic, consistent with a lipoma.  No additional axillary adenopathy noted    Assessment/Plan:   1. Lipoma of torso        Carlos Rubio is a 87 y.o. male with signs and symptoms consistent with a right axillary lipoma.  I suspect that the intermittent pains that he has been having are likely unrelated to the lipoma, and the common location brought his attention to what was otherwise asymptomatic mass.  I have explained the pathophysiology of lipomas in detail as well as the surgical versus non-operative management strategies.      The risks of surgery were discussed in detail which include, but are not limited to, bleeding, infection, injury to surrounding structures, and potential for seroma formation following excision.  Given her benign  history, I advised him that not removing this is probably the easiest course of action, and most reasonable until such time as a source to cause him bother from a mass-effect.    He understands everything which was discussed and would like to defer surgical management at this time.    Oneal Aviles M.D.   [phone]  [facility] Department of Surgery

## 2021-06-14 NOTE — TELEPHONE ENCOUNTER

## 2021-06-14 NOTE — PATIENT INSTRUCTIONS - HE
Mr. Rubio,  Pleasure seeing you today, glad to hear you are doing well.  Per our conversation, cut the metoprolol down to 25 mg or 1 tablet only twice a day.  Keep tabs on your blood pressure, if they start running high please give me a call at [phone] and I might consider going up on the Norvasc or amlodipine to 5 mg a day.   As we discussed, I would talk to your primary about discontinuing your cholesterol pill.  Barring any other issues I will plan on seeing you in about 1 year or sooner if needed.  Stay safe.  LF

## 2021-06-16 NOTE — PROGRESS NOTES
ASSESSMENT AND PLAN:  Carlos Rubio 87 y.o. male is seen here on 03/06/18 for follow-up of polyarthralgia in the background of Osteoarthritis, psoriasis.  The key question being if he has psoriatic arthritis.  The likelihood of that appears to be remote.  We discussed this in detail.  He is recuperating from left carpal tunnel release surgery.  He is going to continue using acetaminophen as needed.  He is to follow-up in 12 months or sooner.      Diagnoses and all orders for this visit:    Polyarthralgia    Primary osteoarthritis involving multiple joints    HISTORY OF PRESENTING ILLNESS:  Carlos Rubio, 87 y.o., male is here for follow-up of osteoarthritis, background of psoriasis.  His only area pain he noted, and the interpedicular system, is at carpal tunnel release.   at one point in the past prior diagnosis of psoriatic arthritis, being on Humira, originally presented to rheumatology for years ago with bilateral temporal artery artery area pain resulting in biopsies and imaging including MRI without evidence of temporal arteritis.  He describes no headache or jaw claudication proximal upper proximal lower extremity pain or stiffness.  He has over time been off the Humira.  He was not sure that was doing much for him.  Currently he noted the most of his pain is in his hands in the in the PIPs and the DIP areas.  This pain does not trouble him as long as he is at rest.  When he moves or uses his joints such as the hands that is when he would get the discomfort and then 2 he rates it as mild.  Often Tylenol will take care of this pain.  He has not observed swelling in any of her joints.  His morning stiffness is minimal at 5 minutes.  He has psoriasis on his elbows knees buttock areas.  There is no family history of rheumatoid arthritis, ankylosing spondylitis.  He has not had features suggestive of uveitis.  He gets back pain that response to exercise.  He retired from [employer] work, he is  non-smoker.   Several his comorbidities are reviewed.    Further historical information and ADL limitations as noted in the multidimensional health assessment questionnaire attached in the EMR.      ALLERGIES:Sulfamethoxazole-trimethoprim    PAST MEDICAL/ACTIVE PROBLEMS/MEDICATION/ FAMILY HISTORY/SOCIAL DATA:  The patient has a family history of  Past Medical History:   Diagnosis Date     A-fib     on coumadin     ARF (acute renal failure) 1979    States both his kidneys quit. Was going to start dialysis when they started working again.     Arthritis      Basal cell carcinoma      Carpal tunnel syndrome      Diabetes     MUSA SAID HE DOES NOT HAVE DIABETES 12/11/15.     Heart failure      HTN (hypertension)      Psoriasis      Psoriatic arthritis      Sicca syndrome     MUSA SAID HE DOES NOT HAVE/NEVER HAD SICCA SYNDROME 12/11/15.     History   Smoking Status     Former Smoker     Packs/day: 0.50     Years: 20.00     Quit date: 1/1/1975   Smokeless Tobacco     Never Used     Comment: Smoked a pipe from 1951 to about 1975 (or maybe 1973 - he can't remember)     Patient Active Problem List   Diagnosis     Carpal Tunnel Syndrome     Neck Pain     Headache     Lightheadedness     Unspecified fall     Atrial Fibrillation     Dissection Of The Right Vertebral Artery     Chronic Sinusitis     Sicca Syndrome     Arthritis     Psoriasis     Fatigue     Stroke Syndrome     Immunology Studies Nonspecific Abnormal Findings     Osteopenia     Dizziness     Diabetes     Hx of basal cell carcinoma     Acute diastolic congestive heart failure     Hypertension, essential     Osteoarthritis     Primary osteoarthritis involving multiple joints     Polyarthralgia     Current Outpatient Prescriptions   Medication Sig Dispense Refill     acetaminophen (TYLENOL) 500 MG tablet Take 1,000 mg by mouth every 6 (six) hours as needed for pain.       amLODIPine (NORVASC) 2.5 MG tablet Take 2.5 mg by mouth daily.       CALCIUM CARBONATE  "(CALCIUM 500 ORAL) Take by mouth. Take 2 tablets daily       cholecalciferol, vitamin D3, 1,000 unit tablet Take 1,000 Units by mouth daily.       fexofenadine (ALLEGRA) 180 MG tablet Take 180 mg by mouth daily.       finasteride (PROSCAR) 5 mg tablet Take 5 mg by mouth daily. As directed       lisinopril (PRINIVIL,ZESTRIL) 20 MG tablet Take 20 mg by mouth daily.       metoprolol tartrate (LOPRESSOR) 25 MG tablet 50 mg 2 (two) times a day.        niacin (SLO-NIACIN) 500 mg ER tablet Take 1,000 mg by mouth at bedtime.       primidone (MYSOLINE) 50 MG tablet Take 150 mg by mouth bedtime.        simvastatin (ZOCOR) 40 MG tablet Take 40 mg by mouth bedtime.       spironolactone (ALDACTONE) 25 MG tablet Take 25 mg by mouth daily.       tamsulosin (FLOMAX) 0.4 mg Cp24 Take 1 capsule by mouth daily after supper.        torsemide (DEMADEX) 20 MG tablet Take 20 mg by mouth daily.  3     warfarin (COUMADIN) 5 MG tablet Take 5 mg by mouth. Daily.  Adjust dose as directed.       docusate sodium (STOOL SOFTENER) 100 mg capsule Take 200 mg by mouth daily with supper.        No current facility-administered medications for this visit.      DETAILED EXAMINATION  03/06/18  :  Vitals:    03/06/18 1403   BP: 136/70   Pulse: (!) 58   Resp: 10   Weight: 188 lb (85.3 kg)   Height: 5' 9\" (1.753 m)     Alert oriented. Head including the face is examined for malar rash, heliotropes, scarring, lupus pernio. Eyes examined for redness such as in episcleritis/scleritis, periorbital lesions.   Neck/ Face examined for parotid gland swelling, range of motion of neck.  Left upper and lower and right upper and lower extremities examined for tenderness, swelling, warmth of the appendicular joints, range of motion, edema, rash.  Some of the important findings included: No synovitis in any of the palpable appendical the joints he does not have dactylitis, patellar enthesitis.  He is not using a cane for ambulation today.  There is no nail itching or " onycholysis.  He has minimal swelling of the left wrist volar aspect.      LAB / IMAGING DATA:  ALT   Date Value Ref Range Status   12/13/2017 14 0 - 45 U/L Final   11/02/2017 8 0 - 45 U/L Final   08/05/2015 14 0 - 45 U/L Final     Albumin   Date Value Ref Range Status   12/13/2017 3.3 (L) 3.5 - 5.0 g/dL Final   11/02/2017 3.4 (L) 3.5 - 5.0 g/dL Final   08/05/2015 3.6 3.5 - 5.0 g/dL Final     Creatinine   Date Value Ref Range Status   01/04/2018 0.80 0.70 - 1.30 mg/dL Final   12/13/2017 0.81 0.70 - 1.30 mg/dL Final   04/25/2017 0.95 0.70 - 1.30 mg/dL Final       WBC   Date Value Ref Range Status   12/14/2015 9.8 4.0 - 11.0 thou/uL Final   12/13/2015 10.5 4.0 - 11.0 thou/uL Final     Hemoglobin   Date Value Ref Range Status   12/14/2015 15.1 14.0 - 18.0 g/dL Final   12/13/2015 14.5 14.0 - 18.0 g/dL Final   12/12/2015 14.6 14.0 - 18.0 g/dL Final     Platelets   Date Value Ref Range Status   12/14/2015 199 140 - 440 thou/uL Final   12/13/2015 189 140 - 440 thou/uL Final   12/12/2015 183 140 - 440 thou/uL Final       Lab Results   Component Value Date    SEDRATE 28 (H) 06/06/2017

## 2021-06-20 NOTE — PROGRESS NOTES
[facility] Follow-up Note    Assessment & Plan        1. Permanent atrial fibrillation (H) -permanent, not valvular and asymptomatic with an INR controlled by primary at most recently 3.5.   2. Acute diastolic congestive heart failure (H) -no signs or symptoms currently.  Continue current meds.   3. Dizziness -most likely due to numerous medications.  Defer to primary but would back off on niacin since patient is on numerous other medications.   4. Hypertension, essential -well controlled currently on amlodipine, Proscar, lisinopril, metoprolol, tamsulosin, Aldactone, and Demadex.   5.  Hypercholesterolemia-on simvastatin as well as Slo-Niacin.  Cholesterol 131 with an LDL of 65.  Given age, concern about liver toxicity, and side effects would consider backing off on niacin.    Plan  1.  Recommend discontinuing niacin.  2.  Continue current medications.  3.  Follow me in 18 months or sooner if needed.    Subjective  CC: 88-year-old white gentleman here for a yearly follow-up today.  He complains of occasional lightheadedness on quick movements.  Otherwise there is no chest discomfort, palpitations, shortness breath, PND, orthopnea or peripheral edema.    Medications  Current Outpatient Prescriptions   Medication Sig Note     acetaminophen (TYLENOL) 500 MG tablet Take 1,000 mg by mouth every 6 (six) hours as needed for pain.      amLODIPine (NORVASC) 2.5 MG tablet Take 2.5 mg by mouth daily.      cholecalciferol, vitamin D3, 1,000 unit tablet Take 1,000 Units by mouth daily.      docusate sodium (STOOL SOFTENER) 100 mg capsule Take 200 mg by mouth daily with supper.  10/12/2017: prn     fexofenadine (ALLEGRA) 180 MG tablet Take 180 mg by mouth daily.      finasteride (PROSCAR) 5 mg tablet Take 5 mg by mouth daily. As directed      lisinopril (PRINIVIL,ZESTRIL) 20 MG tablet Take 20 mg by mouth daily.      metoprolol tartrate (LOPRESSOR) 25 MG tablet 50 mg 2 (two) times a day.  2/3/2016: Received  "from: External Pharmacy     niacin (SLO-NIACIN) 500 mg ER tablet Take 1,000 mg by mouth at bedtime.      primidone (MYSOLINE) 50 MG tablet Take 150 mg by mouth bedtime.  12/11/2015: .     simvastatin (ZOCOR) 40 MG tablet Take 40 mg by mouth bedtime.      spironolactone (ALDACTONE) 25 MG tablet Take 25 mg by mouth daily.      tamsulosin (FLOMAX) 0.4 mg Cp24 Take 1 capsule by mouth daily after supper.  12/11/2015: .     torsemide (DEMADEX) 20 MG tablet Take 20 mg by mouth daily. 10/12/2017: Take 1-1.5 tablets once a day     warfarin (COUMADIN) 5 MG tablet Take 5 mg by mouth. Daily.  Adjust dose as directed. 12/11/2015: Takes in the morning.       Objective  /80  Pulse 63  Resp 12  Ht 5' 9\" (1.753 m)  Wt 190 lb (86.2 kg)  BMI 28.06 kg/m2    General Appearance:    Alert, cooperative, no distress, appears stated age   Head:    Normocephalic, without obvious abnormality, atraumatic   Throat:   Lips, mucosa, and tongue normal; teeth and gums normal   Neck:   Supple, symmetrical, trachea midline, no adenopathy;        thyroid:  No enlargement/tenderness/nodules; no carotid    bruit or JVD   Back:     Symmetric, no curvature, ROM normal, no CVA tenderness   Lungs:     Clear to auscultation bilaterally, respirations unlabored   Chest wall:    No tenderness or deformity   Heart:   Irregularly irregular S1 and S2 normal, no murmur, rub   or gallop   Abdomen:     Soft, non-tender, bowel sounds active all four quadrants,     no masses, no organomegaly   Extremities:   Normal, atraumatic, no cyanosis or edema   Pulses:   2+ and symmetric all extremities   Skin:   Skin color, texture, turgor normal, no rashes or lesions     Results    Lab Results personally reviewed   Lab Results   Component Value Date    CHOL 131 07/11/2018    CHOL 146 12/13/2017     Lab Results   Component Value Date    HDL 36 (L) 07/11/2018    HDL 38 (L) 12/13/2017     Lab Results   Component Value Date    LDLCALC 65 07/11/2018    LDLCALC 79 " 12/13/2017     Lab Results   Component Value Date    TRIG 148 07/11/2018    TRIG 147 12/13/2017     Lab Results   Component Value Date    WBC 9.8 12/14/2015    HGB 15.1 12/14/2015    HCT 46.5 12/14/2015     12/14/2015     Lab Results   Component Value Date    CREATININE 0.91 07/11/2018    BUN 18 07/11/2018     07/11/2018    K 4.5 07/11/2018    CO2 25 07/11/2018     Review of Systems:   General: WNL  Eyes: WNL  Ears/Nose/Throat: WNL  Lungs: Shortness of Breath  Heart: Shortness of Breath with activity  Stomach: WNL  Bladder: WNL  Muscle/Joints: WNL  Skin: WNL  Nervous System: Falls, Dizziness, Loss of Balance  Mental Health: WNL     Blood: WNL

## 2021-06-30 NOTE — PROGRESS NOTES
Progress Notes by Cindy Sewell MD at 1/26/2021  3:50 PM     Author: Cindy Sewell MD Service: -- Author Type: Physician    Filed: 1/26/2021  4:41 PM Encounter Date: 1/26/2021 Status: Signed    : Cindy Sewell MD (Physician)           [facility]  Heart Care Clinic Follow-up Note    Assessment & Plan        1. Permanent atrial fibrillation (H) -permanent, not valvular and asymptomatic with an INR controlled by primary at most recently 2.5 from December.   2. Hypertension, essential -well controlled currently on amlodipine, Proscar, lisinopril, metoprolol, tamsulosin, Aldactone, and given bradycardia will back off on metoprolol 25 mg p.o. twice daily and increase amlodipine up to 5 mg a day.   3. Bradycardia -as above some lightheadedness with bradycardia and back off metoprolol.   4. Stroke Syndrome -defer to primary, not sure if documented, do not see any prior history.   5. Acute diastolic congestive heart failure (H) -no signs or symptoms currently. Continue current meds.     Plan  1.  Decrease metoprolol down to 25 mg p.o. twice daily.  2.  Increase Norvasc to 5 mg a day.  This is only if blood pressure remains elevated.  3.  Follow-up me in 1 year or sooner if needed.  4.  Given age, would discontinue cholesterol still.    Subjective  CC: 89-year-old white gentleman here for yearly follow-up, actually saw me 2 months ago.  Still living independently at home with his wife, still driving.  Does go up and down a flight of steps and does have occasional shortness of breath and lightheadedness.  Lightheadedness makes him feel unsteady and he walks with some canes.  No significant chest discomfort, palpitations, PND, orthopnea or peripheral edema.    Medications  Current Outpatient Medications   Medication Sig Note   ? acetaminophen (TYLENOL) 500 MG tablet Take 1,000 mg by mouth every 6 (six) hours as needed for pain.    ? amLODIPine (NORVASC) 2.5 MG tablet Take 2.5 mg by mouth daily.    ?  "cholecalciferol, vitamin D3, 1,000 unit tablet Take 1,000 Units by mouth daily.    ? docusate sodium (STOOL SOFTENER) 100 mg capsule Take 200 mg by mouth daily with supper.  10/12/2017: prn   ? fexofenadine (ALLEGRA) 180 MG tablet Take 180 mg by mouth daily.    ? finasteride (PROSCAR) 5 mg tablet Take 5 mg by mouth daily. As directed    ? lisinopril (PRINIVIL,ZESTRIL) 20 MG tablet Take 20 mg by mouth daily.    ? metoprolol tartrate (LOPRESSOR) 25 MG tablet 50 mg 2 (two) times a day.  2/3/2016: Received from: External Pharmacy   ? primidone (MYSOLINE) 50 MG tablet Take 150 mg by mouth bedtime.  12/11/2015: .   ? simvastatin (ZOCOR) 40 MG tablet Take 40 mg by mouth bedtime.    ? spironolactone (ALDACTONE) 25 MG tablet Take 25 mg by mouth daily.    ? tamsulosin (FLOMAX) 0.4 mg Cp24 Take 1 capsule by mouth daily after supper.  12/11/2015: .   ? torsemide (DEMADEX) 20 MG tablet Take 20 mg by mouth daily. 10/12/2017: Take 1-1.5 tablets once a day   ? warfarin (COUMADIN) 5 MG tablet Take 5 mg by mouth. Daily.  Adjust dose as directed. 12/11/2015: Takes in the morning.       Objective  /70 (Patient Site: Left Arm, Patient Position: Sitting, Cuff Size: Adult Large)   Pulse (!) 46   Resp 10   Ht 5' 9\" (1.753 m)   Wt 189 lb (85.7 kg)   BMI 27.91 kg/m      General Appearance:    Alert, cooperative, no distress, appears stated age   Head:    Normocephalic, without obvious abnormality, atraumatic   Throat:   Lips, mucosa, and tongue normal; teeth and gums normal   Neck:   Supple, symmetrical, trachea midline, no adenopathy;        thyroid:  No enlargement/tenderness/nodules; no carotid    bruit or JVD   Back:     Symmetric, no curvature, ROM normal, no CVA tenderness   Lungs:     Clear to auscultation bilaterally, respirations unlabored   Chest wall:    No tenderness or deformity   Heart:   Irregularly irregular with slow rate, S1 and S2 normal, no murmur, rub   or gallop   Abdomen:     Soft, non-tender, bowel sounds " active all four quadrants,     no masses, no organomegaly   Extremities:   Normal, atraumatic, no cyanosis or edema   Pulses:   2+ and symmetric all extremities   Skin:   Skin color, texture, turgor normal, no rashes or lesions     Results    Lab Results personally reviewed   Lab Results   Component Value Date    CHOL 139 12/22/2020    CHOL 139 05/21/2020     Lab Results   Component Value Date    HDL 37 (L) 12/22/2020    HDL 39 (L) 05/21/2020     Lab Results   Component Value Date    LDLCALC 82 12/22/2020    LDLCALC 75 05/21/2020     Lab Results   Component Value Date    TRIG 102 12/22/2020    TRIG 127 05/21/2020     Lab Results   Component Value Date    WBC 9.8 12/14/2015    HGB 15.1 12/14/2015    HCT 46.5 12/14/2015     12/14/2015     Lab Results   Component Value Date    CREATININE 0.77 12/22/2020    BUN 14 12/22/2020     12/22/2020    K 4.3 12/22/2020    CO2 27 12/22/2020     Reviewed electrocardiogram atrial fibrillation with slow ventricular response with no acute changes.    Review of Systems:   General: WNL  Eyes: WNL  Ears/Nose/Throat: WNL  Lungs: Shortness of Breath  Heart: Shortness of Breath with activity  Stomach: WNL  Bladder: Frequent Urination at Night  Muscle/Joints: WNL  Skin: WNL  Nervous System: Falls, Dizziness, Loss of Balance  Mental Health: WNL     Blood: Easy Bruising

## 2021-07-27 ENCOUNTER — LAB REQUISITION (OUTPATIENT)
Dept: LAB | Facility: CLINIC | Age: 86
End: 2021-07-27

## 2021-07-27 DIAGNOSIS — I11.0 HYPERTENSIVE HEART DISEASE WITH HEART FAILURE (H): ICD-10-CM

## 2021-07-27 DIAGNOSIS — E78.2 MIXED HYPERLIPIDEMIA: ICD-10-CM

## 2021-07-27 LAB
ANION GAP SERPL CALCULATED.3IONS-SCNC: 9 MMOL/L (ref 5–18)
BUN SERPL-MCNC: 18 MG/DL (ref 8–28)
CALCIUM SERPL-MCNC: 9.5 MG/DL (ref 8.5–10.5)
CHLORIDE BLD-SCNC: 106 MMOL/L (ref 98–107)
CHOLEST SERPL-MCNC: 158 MG/DL
CO2 SERPL-SCNC: 27 MMOL/L (ref 22–31)
CREAT SERPL-MCNC: 0.91 MG/DL (ref 0.7–1.3)
FASTING STATUS PATIENT QL REPORTED: NORMAL
GFR SERPL CREATININE-BSD FRML MDRD: 73 ML/MIN/1.73M2
GLUCOSE BLD-MCNC: 92 MG/DL (ref 70–125)
HDLC SERPL-MCNC: 45 MG/DL
LDLC SERPL CALC-MCNC: 93 MG/DL
MAGNESIUM SERPL-MCNC: 1.9 MG/DL (ref 1.8–2.6)
POTASSIUM BLD-SCNC: 4.3 MMOL/L (ref 3.5–5)
SODIUM SERPL-SCNC: 142 MMOL/L (ref 136–145)
TRIGL SERPL-MCNC: 102 MG/DL

## 2021-07-27 PROCEDURE — 80061 LIPID PANEL: CPT | Performed by: FAMILY MEDICINE

## 2021-07-27 PROCEDURE — 80048 BASIC METABOLIC PNL TOTAL CA: CPT | Performed by: FAMILY MEDICINE

## 2021-07-27 PROCEDURE — 83735 ASSAY OF MAGNESIUM: CPT | Performed by: FAMILY MEDICINE

## 2022-04-08 ENCOUNTER — OFFICE VISIT (OUTPATIENT)
Dept: CARDIOLOGY | Facility: CLINIC | Age: 87
End: 2022-04-08
Payer: COMMERCIAL

## 2022-04-08 VITALS
HEART RATE: 52 BPM | BODY MASS INDEX: 28.25 KG/M2 | WEIGHT: 180 LBS | DIASTOLIC BLOOD PRESSURE: 80 MMHG | RESPIRATION RATE: 16 BRPM | SYSTOLIC BLOOD PRESSURE: 146 MMHG | HEIGHT: 67 IN

## 2022-04-08 DIAGNOSIS — I10 BENIGN ESSENTIAL HYPERTENSION: ICD-10-CM
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
## 2022-04-08 DIAGNOSIS — I50.33 ACUTE ON CHRONIC DIASTOLIC CONGESTIVE HEART FAILURE (H): ICD-10-CM

## 2022-04-08 DIAGNOSIS — I48.20 CHRONIC ATRIAL FIBRILLATION (H): Primary | ICD-10-CM

## 2022-04-08 DIAGNOSIS — R00.1 BRADYCARDIA: ICD-10-CM
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
## 2022-04-08 PROCEDURE — 99214 OFFICE O/P EST MOD 30 MIN: CPT | Performed by: INTERNAL MEDICINE

## 2022-04-08 RX ORDER — TAMSULOSIN HYDROCHLORIDE 0.4 MG/1
0.4 CAPSULE ORAL 2 TIMES DAILY
COMMUNITY
Start: 2022-01-18 | End: 2023-01-01

## 2022-04-08 RX ORDER — SPIRONOLACTONE 25 MG/1
25 TABLET ORAL DAILY
COMMUNITY
Start: 2022-01-13

## 2022-04-08 RX ORDER — TORSEMIDE 20 MG/1
1 TABLET ORAL DAILY
Status: ON HOLD | COMMUNITY
End: 2022-10-24

## 2022-04-08 NOTE — PATIENT INSTRUCTIONS
Mr Carlos Rubio,  I enjoyed visiting with you again today.  I am concerned with the falls.  Per our conversation cut the METOPROLOL in 1/2 and take only 1/2 twice a day.  Given this take the LISINOPRIL 2 together every day.  If doing well with this change call me at [phone] and we will get you the higher dose of LISINOPRIL and the lower dose of METOPROLOL.  I will plan on seeing you 1 year or sooner if needed.  Chase Sewell

## 2022-04-08 NOTE — LETTER
4/8/2022    Kyree Bojorquez MD  [facility] [address]  [facility] [address]    RE: Carlos Rubio       Dear Colleague,     I had the pleasure of seeing Carlos Rubio in the [facility].      [facility]  Heart Care Clinic Follow-up Note    Assessment & Plan        (I48.20) Chronic atrial fibrillation (H)  (primary encounter diagnosis)  Comment: Permanent, not valvular and asymptomatic with an INR controlled by primary at most recently 2.2 from December.  Still asymptomatic, not valvular, but will concerned with patient's falls.  Did discuss with him if he continues to fall we will need to discontinue Coumadin.  I did discuss with him left atrial appendage occlusion device and he stated at the age of 89 soon[age]to-be 89 he would not want any aggressive treatment.  Might just need to use no anticoagulation if problems continue.    (I10) Benign essential hypertension  Comment: Not under good control, he tells me at home is more like 1 7180s at times.  He is on metoprolol, lisinopril, and HCTZ as well as Aldactone.  Given his bradycardia I will back off on metoprolol to 50 mg by mouth twice a day and increase lisinopril up to 40 mg daily.  We will check blood pressures at home and if he does well with this we will then call in new prescription strength.    (R00.1) Bradycardia  Comment: Secondary to metoprolol 100 mg by mouth twice a day.  We will decrease this down to 50 mg by mouth twice a day.    (I50.33) Acute on chronic diastolic congestive heart failure (H)  Comment: No specific signs or symptoms currently and he is on Demadex as well as Aldactone as well as HCTZ.  Did not like a 91/89-year-old gentleman on 3 diuretics and would like to get good blood pressure control off HCTZ but defer to primary.    (W19.XXXD) Fall, subsequent encounter  Comment: As above continued falls will need to discontinue anticoagulation.    BPH-patient is on Cardura, Proscar, as well as tamsulosin.   These were 3 alpha blockers which contribute to possibly some unsteadiness.    Plan  1.  Decrease metoprolol down to 50 mg by mouth twice a day.  2.  Increase lisinopril to 40 mg daily.  3.  Patient will call let us know how he does with this and then we will get new strength prescriptions if needed.  4.  If continued falls will need to discontinue Coumadin and go without anticoagulation or left atrial appendage occlusion device.  5.  Follow-up me in 1 year or sooner if needed.  6.  Would suggest primary address the 3 alpha blockers for BPH as well as a 3 diuretics, not sure I would like to keep an unsteady 89-year-old gentleman on his long-term.    Subjective  CC: 89 soon-to-be 89-year-old gentleman here for yearly follow-up today.  Still living independently with his wife, still driving.  Not doing much activity due to the wife being somewhat more infirmed.  Overall he is getting along well but admits to having fallen once in the bathtub and once in the kitchen, most likely due to the feet slipping.  There is no true syncope, presyncope, chest discomfort, palpitations, PND, orthopnea or peripheral edema.    Medications  Current Outpatient Medications   Medication Sig Dispense Refill     doxazosin (CARDURA) 4 MG tablet Take 2 mg by mouth daily        finasteride (PROSCAR) 5 MG tablet Take 5 mg by mouth daily       hydrochlorothiazide (HYDRODIURIL) 25 MG tablet Take 25 mg by mouth daily       lisinopril (PRINIVIL,ZESTRIL) 20 MG tablet Take 20 mg by mouth daily       metoprolol (LOPRESSOR) 100 MG tablet Take 100 mg by mouth 2 times daily       simvastatin (ZOCOR) 40 MG tablet Take by mouth At Bedtime       spironolactone (ALDACTONE) 25 MG tablet Take 25 mg by mouth daily       tamsulosin (FLOMAX) 0.4 MG capsule TAKE 1 CAPSULE BY MOUTH EVERY DAY       torsemide (DEMADEX) 20 MG tablet Take 1 tablet by mouth daily as needed       warfarin (COUMADIN) 5 MG tablet Take by mouth daily 1.5 tab 5/7 and 1 tablet on Sunday and  "Wednesday         Objective  BP (!) 146/80 (BP Location: Left arm, Patient Position: Sitting, Cuff Size: Adult Regular)   Pulse 52   Resp 16   Ht 1.689 m (5' 6.5\")   Wt 81.6 kg (180 lb)   BMI 28.62 kg/m      General Appearance:    Alert, cooperative, no distress, appears stated age   Head:    Normocephalic, without obvious abnormality, atraumatic   Throat:   Lips, mucosa, and tongue normal; teeth and gums normal   Neck:   Supple, symmetrical, trachea midline, no adenopathy;        thyroid:  No enlargement/tenderness/nodules; no carotid    bruit or JVD   Back:     Symmetric, no curvature, ROM normal, no CVA tenderness   Lungs:     Clear to auscultation bilaterally, respirations unlabored   Chest wall:    No tenderness or deformity   Heart:   Irregularly irregular, S1 and S2 normal, no murmur, rub   or gallop   Abdomen:     Soft, non-tender, bowel sounds active all four quadrants,     no masses, no organomegaly   Extremities:   Normal, atraumatic, no cyanosis or edema   Pulses:   2+ and symmetric all extremities   Skin:   Skin color, texture, turgor normal, no rashes or lesions     Results    Lab Results personally reviewed   Lab Results   Component Value Date    CHOL 158 07/27/2021    CHOL 139 12/22/2020     Lab Results   Component Value Date    HDL 45 07/27/2021    HDL 37 (L) 12/22/2020     No components found for: LDLCALC  Lab Results   Component Value Date    TRIG 102 07/27/2021    TRIG 102 12/22/2020     No results found for: WBC, HGB, HCT, PLT  Lab Results   Component Value Date    BUN 18 07/27/2021     07/27/2021    CO2 27 07/27/2021               Thank you for allowing me to participate in the care of your patient.      Sincerely,     DONTE RODRÍGUEZ MD     [facility]  cc:   No referring provider defined for this encounter.        "

## 2022-04-08 NOTE — PROGRESS NOTES
[facility]  Heart Care Clinic Follow-up Note    Assessment & Plan        (I48.20) Chronic atrial fibrillation (H)  (primary encounter diagnosis)  Comment: Permanent, not valvular and asymptomatic with an INR controlled by primary at most recently 2.2 from December.  Still asymptomatic, not valvular, but will concerned with patient's falls.  Did discuss with him if he continues to fall we will need to discontinue Coumadin.  I did discuss with him left atrial appendage occlusion device and he stated at the age of 89 soon-to-be 89 he would not want any aggressive treatment.  Might just need to use no anticoagulation if problems continue.    (I10) Benign essential hypertension  Comment: Not under good control, he tells me at home is more like 1 7180s at times.  He is on metoprolol, lisinopril, and HCTZ as well as Aldactone.  Given his bradycardia I will back off on metoprolol to 50 mg by mouth twice a day and increase lisinopril up to 40 mg daily.  We will check blood pressures at home and if he does well with this we will then call in new prescription strength.    (R00.1) Bradycardia  Comment: Secondary to metoprolol 100 mg by mouth twice a day.  We will decrease this down to 50 mg by mouth twice a day.    (I50.33) Acute on chronic diastolic congestive heart failure (H)  Comment: No specific signs or symptoms currently and he is on Demadex as well as Aldactone as well as HCTZ.  Did not like a 91/89-year-old gentleman on 3 diuretics and would like to get good blood pressure control off HCTZ but defer to primary.    (W19.XXXD) Fall, subsequent encounter  Comment: As above continued falls will need to discontinue anticoagulation.    BPH-patient is on Cardura, Proscar, as well as tamsulosin.  These were 3 alpha blockers which contribute to possibly some unsteadiness.    Plan  1.  Decrease metoprolol down to 50 mg by mouth twice a day.  2.  Increase lisinopril to 40 mg daily.  3.  Patient will call let us know  "how he does with this and then we will get new strength prescriptions if needed.  4.  If continued falls will need to discontinue Coumadin and go without anticoagulation or left atrial appendage occlusion device.  5.  Follow-up me in 1 year or sooner if needed.  6.  Would suggest primary address the 3 alpha blockers for BPH as well as a 3 diuretics, not sure I would like to keep an unsteady 89-year-old gentleman on his long-term.    Subjective  CC: 89 soon-to-be 89-year-old gentleman here for yearly follow-up today.  Still living independently with his wife, still driving.  Not doing much activity due to the wife being somewhat more infirmed.  Overall he is getting along well but admits to having fallen once in the bathtub and once in the kitchen, most likely due to the feet slipping.  There is no true syncope, presyncope, chest discomfort, palpitations, PND, orthopnea or peripheral edema.    Medications  Current Outpatient Medications   Medication Sig Dispense Refill     doxazosin (CARDURA) 4 MG tablet Take 2 mg by mouth daily        finasteride (PROSCAR) 5 MG tablet Take 5 mg by mouth daily       hydrochlorothiazide (HYDRODIURIL) 25 MG tablet Take 25 mg by mouth daily       lisinopril (PRINIVIL,ZESTRIL) 20 MG tablet Take 20 mg by mouth daily       metoprolol (LOPRESSOR) 100 MG tablet Take 100 mg by mouth 2 times daily       simvastatin (ZOCOR) 40 MG tablet Take by mouth At Bedtime       spironolactone (ALDACTONE) 25 MG tablet Take 25 mg by mouth daily       tamsulosin (FLOMAX) 0.4 MG capsule TAKE 1 CAPSULE BY MOUTH EVERY DAY       torsemide (DEMADEX) 20 MG tablet Take 1 tablet by mouth daily as needed       warfarin (COUMADIN) 5 MG tablet Take by mouth daily 1.5 tab 5/7 and 1 tablet on Sunday and Wednesday         Objective  BP (!) 146/80 (BP Location: Left arm, Patient Position: Sitting, Cuff Size: Adult Regular)   Pulse 52   Resp 16   Ht 1.689 m (5' 6.5\")   Wt 81.6 kg (180 lb)   BMI 28.62 kg/m      General " Appearance:    Alert, cooperative, no distress, appears stated age   Head:    Normocephalic, without obvious abnormality, atraumatic   Throat:   Lips, mucosa, and tongue normal; teeth and gums normal   Neck:   Supple, symmetrical, trachea midline, no adenopathy;        thyroid:  No enlargement/tenderness/nodules; no carotid    bruit or JVD   Back:     Symmetric, no curvature, ROM normal, no CVA tenderness   Lungs:     Clear to auscultation bilaterally, respirations unlabored   Chest wall:    No tenderness or deformity   Heart:   Irregularly irregular, S1 and S2 normal, no murmur, rub   or gallop   Abdomen:     Soft, non-tender, bowel sounds active all four quadrants,     no masses, no organomegaly   Extremities:   Normal, atraumatic, no cyanosis or edema   Pulses:   2+ and symmetric all extremities   Skin:   Skin color, texture, turgor normal, no rashes or lesions     Results    Lab Results personally reviewed   Lab Results   Component Value Date    CHOL 158 07/27/2021    CHOL 139 12/22/2020     Lab Results   Component Value Date    HDL 45 07/27/2021    HDL 37 (L) 12/22/2020     No components found for: LDLCALC  Lab Results   Component Value Date    TRIG 102 07/27/2021    TRIG 102 12/22/2020     No results found for: WBC, HGB, HCT, PLT  Lab Results   Component Value Date    BUN 18 07/27/2021     07/27/2021    CO2 27 07/27/2021

## 2022-04-13 ENCOUNTER — LAB REQUISITION (OUTPATIENT)
Dept: LAB | Facility: CLINIC | Age: 87
End: 2022-04-13

## 2022-04-13 DIAGNOSIS — E11.9 TYPE 2 DIABETES MELLITUS WITHOUT COMPLICATIONS (H): ICD-10-CM

## 2022-04-13 DIAGNOSIS — E78.2 MIXED HYPERLIPIDEMIA: ICD-10-CM

## 2022-04-13 DIAGNOSIS — I11.0 HYPERTENSIVE HEART DISEASE WITH HEART FAILURE (H): ICD-10-CM

## 2022-04-13 LAB
ANION GAP SERPL CALCULATED.3IONS-SCNC: 11 MMOL/L (ref 5–18)
BUN SERPL-MCNC: 16 MG/DL (ref 8–28)
CALCIUM SERPL-MCNC: 9.5 MG/DL (ref 8.5–10.5)
CHLORIDE BLD-SCNC: 103 MMOL/L (ref 98–107)
CHOLEST SERPL-MCNC: 145 MG/DL
CO2 SERPL-SCNC: 27 MMOL/L (ref 22–31)
CREAT SERPL-MCNC: 0.86 MG/DL (ref 0.7–1.3)
FASTING STATUS PATIENT QL REPORTED: NORMAL
GFR SERPL CREATININE-BSD FRML MDRD: 82 ML/MIN/1.73M2
GLUCOSE BLD-MCNC: 84 MG/DL (ref 70–125)
HDLC SERPL-MCNC: 45 MG/DL
LDLC SERPL CALC-MCNC: 79 MG/DL
POTASSIUM BLD-SCNC: 4.6 MMOL/L (ref 3.5–5)
SODIUM SERPL-SCNC: 141 MMOL/L (ref 136–145)
TRIGL SERPL-MCNC: 104 MG/DL

## 2022-04-13 PROCEDURE — 80061 LIPID PANEL: CPT | Performed by: FAMILY MEDICINE

## 2022-04-13 PROCEDURE — 80048 BASIC METABOLIC PNL TOTAL CA: CPT | Performed by: FAMILY MEDICINE

## 2022-04-13 PROCEDURE — 82043 UR ALBUMIN QUANTITATIVE: CPT | Performed by: FAMILY MEDICINE

## 2022-04-14 LAB
CREAT UR-MCNC: 76 MG/DL
MICROALBUMIN UR-MCNC: 4.56 MG/DL (ref 0–1.99)
MICROALBUMIN/CREAT UR: 60 MG/G CR

## 2022-04-25 ENCOUNTER — TELEPHONE (OUTPATIENT)
Dept: CARDIOLOGY | Facility: CLINIC | Age: 87
End: 2022-04-25
Payer: COMMERCIAL

## 2022-04-25 DIAGNOSIS — I10 BENIGN ESSENTIAL HYPERTENSION: Primary | ICD-10-CM

## 2022-04-26 RX ORDER — LISINOPRIL 40 MG/1
40 TABLET ORAL DAILY
Qty: 90 TABLET | Refills: 0 | Status: SHIPPED | OUTPATIENT
Start: 2022-04-26 | End: 2022-07-25

## 2022-04-26 RX ORDER — METOPROLOL TARTRATE 25 MG/1
12.5 TABLET, FILM COATED ORAL 2 TIMES DAILY
Qty: 90 TABLET | Refills: 0 | Status: SHIPPED | OUTPATIENT
Start: 2022-04-26 | End: 2022-08-01

## 2022-04-26 NOTE — TELEPHONE ENCOUNTER
See office visit note with LBF dated 4/8/22.        Plan  1.  Decrease metoprolol down to 50 mg by mouth twice a day.  2.  Increase lisinopril to 40 mg daily.  3.  Patient will call let us know how he does with this and then we will get new strength prescriptions if needed.  4.  If continued falls will need to discontinue Coumadin and go without anticoagulation or left atrial appendage occlusion device.  5.  Follow-up me in 1 year or sooner if needed.  6.  Would suggest primary address the 3 alpha blockers for BPH as well as a 3 diuretics, not sure I would like to keep an unsteady 89-year-old gentleman on his long-term.             Pt called in to report that he is doing well since recent medication changes made by [facility]. His Metoprolol was cut in half due to bradycardia. His lisinopril was doubled for BP control.     He states his blood pressure has been stable around 130-140 systolic. He had no pulses to provide.    Our medication list states that he is on Metoprolol 100 mg BID. He is actually on 25 mg BID and there fore, decreased it to 12.5 mg BID. Will update LBF on this. He confirmed his dosing on his bottle + writer reviewed last [facility] note and it was accurate. His Lisinopril was correct on our list. -[facility]        Carlos Bobo called in to report that he is doing well after recent medication changes during your last visit. He requested refills sent in but when I went to confirm dosage, his Metoprolol that he is on at home was different that our list and therefore, your note. He was previously on Metoprolol Tartrate 25 mg bid and therefore, went down to 12.5 mg bid. Our medication list says 100 mg bid and so your note reflects going down to 50 mg bid. I had him double check his bottle and I reviewed last [facility] note. OK to refill for the dose he is taking?  Thanks,  Mal

## 2022-04-26 NOTE — TELEPHONE ENCOUNTER
Chase Sewell MD Caswell, Mallory J, RN  Caller: Unspecified (Yesterday,  2:34 PM)  Yes   LF         Refills sent in for updated dosage. Called patient and spoke with his wife- she verbalized understanding-[facility]

## 2022-05-19 ENCOUNTER — LAB REQUISITION (OUTPATIENT)
Dept: LAB | Facility: CLINIC | Age: 87
End: 2022-05-19

## 2022-05-19 DIAGNOSIS — L73.9 FOLLICULAR DISORDER, UNSPECIFIED: ICD-10-CM

## 2022-05-19 PROCEDURE — 87798 DETECT AGENT NOS DNA AMP: CPT | Performed by: FAMILY MEDICINE

## 2022-05-19 PROCEDURE — 87070 CULTURE OTHR SPECIMN AEROBIC: CPT | Performed by: FAMILY MEDICINE

## 2022-05-20 LAB — VZV DNA SPEC QL NAA+PROBE: NEGATIVE

## 2022-05-22 LAB — BACTERIA WND CULT: NORMAL

## 2022-08-01 ENCOUNTER — TELEPHONE (OUTPATIENT)
Dept: CARDIOLOGY | Facility: CLINIC | Age: 87
End: 2022-08-01

## 2022-08-01 DIAGNOSIS — I10 BENIGN ESSENTIAL HYPERTENSION: ICD-10-CM

## 2022-08-01 RX ORDER — METOPROLOL TARTRATE 25 MG/1
12.5 TABLET, FILM COATED ORAL 2 TIMES DAILY
Qty: 180 TABLET | Refills: 3 | Status: SHIPPED | OUTPATIENT
Start: 2022-08-01 | End: 2022-12-12

## 2022-08-01 NOTE — TELEPHONE ENCOUNTER
[facility] Call Center    Phone Message    May a detailed message be left on voicemail: yes     Reason for Call: Other: New perscription needed: Provider reduced the dosage      metoprolol tartrate has been changed to 12.5 mg twice daily    Please send new script to:  [facility] #[id number] - [place], [place] - [address]    Patient will be out as of 8/4. Please fill before this date.    Action Taken: Other: Cardiology    Travel Screening: Not Applicable                                        8/4

## 2022-09-15 ENCOUNTER — LAB REQUISITION (OUTPATIENT)
Dept: LAB | Facility: CLINIC | Age: 87
End: 2022-09-15

## 2022-09-15 DIAGNOSIS — E87.70 FLUID OVERLOAD, UNSPECIFIED: ICD-10-CM

## 2022-09-15 LAB
ANION GAP SERPL CALCULATED.3IONS-SCNC: 5 MMOL/L (ref 7–15)
BUN SERPL-MCNC: 20.3 MG/DL (ref 8–23)
CALCIUM SERPL-MCNC: 9.4 MG/DL (ref 8.2–9.6)
CHLORIDE SERPL-SCNC: 102 MMOL/L (ref 98–107)
CREAT SERPL-MCNC: 1.12 MG/DL (ref 0.67–1.17)
DEPRECATED HCO3 PLAS-SCNC: 33 MMOL/L (ref 22–29)
GFR SERPL CREATININE-BSD FRML MDRD: 62 ML/MIN/1.73M2
GLUCOSE SERPL-MCNC: 78 MG/DL (ref 70–99)
POTASSIUM SERPL-SCNC: 4.7 MMOL/L (ref 3.4–5.3)
SODIUM SERPL-SCNC: 140 MMOL/L (ref 136–145)

## 2022-09-15 PROCEDURE — 80048 BASIC METABOLIC PNL TOTAL CA: CPT | Performed by: FAMILY MEDICINE

## 2022-10-06 ENCOUNTER — TRANSFERRED RECORDS (OUTPATIENT)
Dept: HEALTH INFORMATION MANAGEMENT | Facility: CLINIC | Age: 87
End: 2022-10-06

## 2022-10-06 ENCOUNTER — LAB REQUISITION (OUTPATIENT)
Dept: LAB | Facility: CLINIC | Age: 87
End: 2022-10-06

## 2022-10-06 DIAGNOSIS — E78.2 MIXED HYPERLIPIDEMIA: ICD-10-CM

## 2022-10-06 DIAGNOSIS — I11.0 HYPERTENSIVE HEART DISEASE WITH HEART FAILURE (H): ICD-10-CM

## 2022-10-06 LAB
ANION GAP SERPL CALCULATED.3IONS-SCNC: 10 MMOL/L (ref 7–15)
BUN SERPL-MCNC: 16.9 MG/DL (ref 8–23)
CALCIUM SERPL-MCNC: 9 MG/DL (ref 8.2–9.6)
CHLORIDE SERPL-SCNC: 106 MMOL/L (ref 98–107)
CHOLEST SERPL-MCNC: 147 MG/DL
CREAT SERPL-MCNC: 0.89 MG/DL (ref 0.67–1.17)
DEPRECATED HCO3 PLAS-SCNC: 27 MMOL/L (ref 22–29)
GFR SERPL CREATININE-BSD FRML MDRD: 80 ML/MIN/1.73M2
GLUCOSE SERPL-MCNC: 89 MG/DL (ref 70–99)
HDLC SERPL-MCNC: 47 MG/DL
LDLC SERPL CALC-MCNC: 76 MG/DL
NONHDLC SERPL-MCNC: 100 MG/DL
POTASSIUM SERPL-SCNC: 4.4 MMOL/L (ref 3.4–5.3)
SODIUM SERPL-SCNC: 143 MMOL/L (ref 136–145)
TRIGL SERPL-MCNC: 120 MG/DL

## 2022-10-06 PROCEDURE — 80048 BASIC METABOLIC PNL TOTAL CA: CPT | Performed by: FAMILY MEDICINE

## 2022-10-06 PROCEDURE — 80061 LIPID PANEL: CPT | Performed by: FAMILY MEDICINE

## 2022-10-18 ENCOUNTER — APPOINTMENT (OUTPATIENT)
Dept: RADIOLOGY | Facility: HOSPITAL | Age: 87
DRG: 304 | End: 2022-10-18
Attending: EMERGENCY MEDICINE
Payer: COMMERCIAL

## 2022-10-18 ENCOUNTER — HOSPITAL ENCOUNTER (INPATIENT)
Facility: HOSPITAL | Age: 87
LOS: 5 days | Discharge: HOME HEALTH | DRG: 304 | End: 2022-10-24
Attending: EMERGENCY MEDICINE | Admitting: HOSPITALIST
Payer: COMMERCIAL

## 2022-10-18 DIAGNOSIS — I10 BENIGN ESSENTIAL HYPERTENSION: Primary | ICD-10-CM

## 2022-10-18 DIAGNOSIS — I48.19 PERSISTENT ATRIAL FIBRILLATION (H): ICD-10-CM

## 2022-10-18 DIAGNOSIS — I16.0 HYPERTENSIVE URGENCY: ICD-10-CM

## 2022-10-18 DIAGNOSIS — I50.33 ACUTE ON CHRONIC DIASTOLIC CONGESTIVE HEART FAILURE (H): ICD-10-CM

## 2022-10-18 DIAGNOSIS — I50.23 ACUTE ON CHRONIC SYSTOLIC CONGESTIVE HEART FAILURE (H): ICD-10-CM

## 2022-10-18 LAB
ALBUMIN SERPL BCG-MCNC: 3.9 G/DL (ref 3.5–5.2)
ALP SERPL-CCNC: 104 U/L (ref 40–129)
ALT SERPL W P-5'-P-CCNC: 13 U/L (ref 10–50)
ANION GAP SERPL CALCULATED.3IONS-SCNC: 11 MMOL/L (ref 7–15)
AST SERPL W P-5'-P-CCNC: 24 U/L (ref 10–50)
BASOPHILS # BLD AUTO: 0.1 10E3/UL (ref 0–0.2)
BASOPHILS NFR BLD AUTO: 1 %
BILIRUB DIRECT SERPL-MCNC: <0.2 MG/DL (ref 0–0.3)
BILIRUB SERPL-MCNC: 0.5 MG/DL
BUN SERPL-MCNC: 13.4 MG/DL (ref 8–23)
CALCIUM SERPL-MCNC: 9.6 MG/DL (ref 8.2–9.6)
CHLORIDE SERPL-SCNC: 96 MMOL/L (ref 98–107)
CREAT SERPL-MCNC: 0.82 MG/DL (ref 0.67–1.17)
DEPRECATED HCO3 PLAS-SCNC: 29 MMOL/L (ref 22–29)
EOSINOPHIL # BLD AUTO: 0.1 10E3/UL (ref 0–0.7)
EOSINOPHIL NFR BLD AUTO: 1 %
ERYTHROCYTE [DISTWIDTH] IN BLOOD BY AUTOMATED COUNT: 12.9 % (ref 10–15)
GFR SERPL CREATININE-BSD FRML MDRD: 82 ML/MIN/1.73M2
GLUCOSE SERPL-MCNC: 113 MG/DL (ref 70–99)
HCT VFR BLD AUTO: 46.1 % (ref 40–53)
HGB BLD-MCNC: 15.1 G/DL (ref 13.3–17.7)
HOLD SPECIMEN: NORMAL
IMM GRANULOCYTES # BLD: 0.1 10E3/UL
IMM GRANULOCYTES NFR BLD: 1 %
INR PPP: 4.49 (ref 0.85–1.15)
LYMPHOCYTES # BLD AUTO: 1.3 10E3/UL (ref 0.8–5.3)
LYMPHOCYTES NFR BLD AUTO: 11 %
MAGNESIUM SERPL-MCNC: 1.9 MG/DL (ref 1.7–2.3)
MCH RBC QN AUTO: 32.5 PG (ref 26.5–33)
MCHC RBC AUTO-ENTMCNC: 32.8 G/DL (ref 31.5–36.5)
MCV RBC AUTO: 99 FL (ref 78–100)
MONOCYTES # BLD AUTO: 0.7 10E3/UL (ref 0–1.3)
MONOCYTES NFR BLD AUTO: 7 %
NEUTROPHILS # BLD AUTO: 9 10E3/UL (ref 1.6–8.3)
NEUTROPHILS NFR BLD AUTO: 79 %
NRBC # BLD AUTO: 0 10E3/UL
NRBC BLD AUTO-RTO: 0 /100
NT-PROBNP SERPL-MCNC: 4025 PG/ML (ref 0–1800)
PLATELET # BLD AUTO: 295 10E3/UL (ref 150–450)
POTASSIUM SERPL-SCNC: 4 MMOL/L (ref 3.4–5.3)
PROT SERPL-MCNC: 7.6 G/DL (ref 6.4–8.3)
RBC # BLD AUTO: 4.64 10E6/UL (ref 4.4–5.9)
SARS-COV-2 RNA RESP QL NAA+PROBE: NEGATIVE
SODIUM SERPL-SCNC: 136 MMOL/L (ref 136–145)
TROPONIN T SERPL HS-MCNC: 27 NG/L
TROPONIN T SERPL HS-MCNC: 27 NG/L
TSH SERPL DL<=0.005 MIU/L-ACNC: 2.23 UIU/ML (ref 0.3–4.2)
WBC # BLD AUTO: 11.1 10E3/UL (ref 4–11)

## 2022-10-18 PROCEDURE — 250N000013 HC RX MED GY IP 250 OP 250 PS 637: Performed by: EMERGENCY MEDICINE

## 2022-10-18 PROCEDURE — 85025 COMPLETE CBC W/AUTO DIFF WBC: CPT | Performed by: EMERGENCY MEDICINE

## 2022-10-18 PROCEDURE — C9803 HOPD COVID-19 SPEC COLLECT: HCPCS

## 2022-10-18 PROCEDURE — 250N000011 HC RX IP 250 OP 636: Performed by: EMERGENCY MEDICINE

## 2022-10-18 PROCEDURE — U0005 INFEC AGEN DETEC AMPLI PROBE: HCPCS | Performed by: EMERGENCY MEDICINE

## 2022-10-18 PROCEDURE — 82248 BILIRUBIN DIRECT: CPT | Performed by: EMERGENCY MEDICINE

## 2022-10-18 PROCEDURE — 71045 X-RAY EXAM CHEST 1 VIEW: CPT

## 2022-10-18 PROCEDURE — 51702 INSERT TEMP BLADDER CATH: CPT

## 2022-10-18 PROCEDURE — 83880 ASSAY OF NATRIURETIC PEPTIDE: CPT | Performed by: EMERGENCY MEDICINE

## 2022-10-18 PROCEDURE — 84132 ASSAY OF SERUM POTASSIUM: CPT | Performed by: INTERNAL MEDICINE

## 2022-10-18 PROCEDURE — 96374 THER/PROPH/DIAG INJ IV PUSH: CPT

## 2022-10-18 PROCEDURE — 36415 COLL VENOUS BLD VENIPUNCTURE: CPT | Performed by: EMERGENCY MEDICINE

## 2022-10-18 PROCEDURE — 84132 ASSAY OF SERUM POTASSIUM: CPT | Performed by: HOSPITALIST

## 2022-10-18 PROCEDURE — 93005 ELECTROCARDIOGRAM TRACING: CPT | Performed by: EMERGENCY MEDICINE

## 2022-10-18 PROCEDURE — 84443 ASSAY THYROID STIM HORMONE: CPT | Performed by: EMERGENCY MEDICINE

## 2022-10-18 PROCEDURE — 99285 EMERGENCY DEPT VISIT HI MDM: CPT | Mod: 25

## 2022-10-18 PROCEDURE — 84484 ASSAY OF TROPONIN QUANT: CPT | Performed by: EMERGENCY MEDICINE

## 2022-10-18 PROCEDURE — 85610 PROTHROMBIN TIME: CPT | Performed by: EMERGENCY MEDICINE

## 2022-10-18 PROCEDURE — 96375 TX/PRO/DX INJ NEW DRUG ADDON: CPT

## 2022-10-18 PROCEDURE — 83735 ASSAY OF MAGNESIUM: CPT | Performed by: INTERNAL MEDICINE

## 2022-10-18 PROCEDURE — 83735 ASSAY OF MAGNESIUM: CPT | Performed by: EMERGENCY MEDICINE

## 2022-10-18 RX ORDER — FUROSEMIDE 10 MG/ML
40 INJECTION INTRAMUSCULAR; INTRAVENOUS ONCE
Status: COMPLETED | OUTPATIENT
Start: 2022-10-18 | End: 2022-10-18

## 2022-10-18 RX ORDER — NITROGLYCERIN 80 MG/1
1 PATCH TRANSDERMAL ONCE
Status: COMPLETED | OUTPATIENT
Start: 2022-10-18 | End: 2022-10-19

## 2022-10-18 RX ORDER — PRIMIDONE 50 MG/1
50 TABLET ORAL 3 TIMES DAILY
COMMUNITY

## 2022-10-18 RX ORDER — HYDRALAZINE HYDROCHLORIDE 20 MG/ML
10 INJECTION INTRAMUSCULAR; INTRAVENOUS ONCE
Status: COMPLETED | OUTPATIENT
Start: 2022-10-18 | End: 2022-10-18

## 2022-10-18 RX ORDER — METOPROLOL TARTRATE 25 MG/1
25 TABLET, FILM COATED ORAL ONCE
Status: COMPLETED | OUTPATIENT
Start: 2022-10-18 | End: 2022-10-18

## 2022-10-18 RX ORDER — PROPRANOLOL HYDROCHLORIDE 20 MG/1
20 TABLET ORAL 2 TIMES DAILY
Status: ON HOLD | COMMUNITY
End: 2022-10-24

## 2022-10-18 RX ADMIN — FUROSEMIDE 40 MG: 10 INJECTION, SOLUTION INTRAMUSCULAR; INTRAVENOUS at 22:29

## 2022-10-18 RX ADMIN — METOPROLOL TARTRATE 12.5 MG: 25 TABLET, FILM COATED ORAL at 21:21

## 2022-10-18 RX ADMIN — METOPROLOL TARTRATE 25 MG: 25 TABLET, FILM COATED ORAL at 19:04

## 2022-10-18 RX ADMIN — HYDRALAZINE HYDROCHLORIDE 10 MG: 20 INJECTION, SOLUTION INTRAMUSCULAR; INTRAVENOUS at 21:34

## 2022-10-18 ASSESSMENT — ENCOUNTER SYMPTOMS
NUMBNESS: 0
SHORTNESS OF BREATH: 0
HEADACHES: 0

## 2022-10-18 ASSESSMENT — ACTIVITIES OF DAILY LIVING (ADL)
ADLS_ACUITY_SCORE: 35
ADLS_ACUITY_SCORE: 35

## 2022-10-18 NOTE — ED NOTES
ED Triage Provider Note  [facility]  Encounter Date: Oct 18, 2022      The patient was seen in triage to expedite ED workup.     History:  Chief Complaint   Patient presents with     Hypertension     Carlos Rubio is a 89 year old male with history of HTN, diastolic CHF, a-fib (takes warfarin) presenting for evaluation of high blood pressure. Reports he was feeling fine earlier today, then was sitting at home around 3:15pm (about 3.5 hours ago) when he had a flushing sensation. Resolved spontaneously after about 30 minutes with no pain or difficulty breathing. Checked high BP and SBP was 190s. Later was 220s. Called PCP clinic and told to take extra lisinopril & come to the ED if BP still >160 after this. Patient did this but BP still high so came to the ED. Denies any symptoms at this time including headache, vision changes, chest pain, shortness of breath.    Of note, patient states he takes his BP daily and SBP runs 140s-160s.    Review of Systems:  - Positive for high BP, flushing episodes (resolved)  - Negative for chest pain, shortness of breath, dizziness, lightheadedness, headache, vision changes, numbness, tingling, focal weakness    Vitals:  BP (!) 240/104   Pulse 84   Temp 97.6  F (36.4  C) (Oral)   Resp 16   Wt 81.6 kg (179 lb 14.3 oz)   SpO2 95%   BMI 28.60 kg/m      Brief Exam:  Walks with a cane without difficulty, clear speech, normal work of breathing, no distress    Medical Decision Making:  Patient arriving to the ED with problem as above. A medical screening exam was performed. Orders initiated from triage to expedite ED workup.    Will obtain EKG, blood to evaluate while giving additional BP meds. Flushing was only symptom and no symptoms for 3 hours. Patient & family comfortable with this plan; no further questions at this time.    The patient is appropriate to wait in triage until ED room available.      Orders Placed This Encounter   Procedures     INR      Basic metabolic panel     Hepatic function panel     Troponin T, High Sensitivity     Magnesium     ECG 12-LEAD WITH MUSE (LHE)     Peripheral IV catheter     Cardiac Continuous Monitoring     Pulse oximetry nursing     CBC with platelets differential           Georgi Sy MD  10/18/22  Emergency Medicine  [facility] EMERGENCY DEPARTMENT  [address] [place] [id number]  [phone]  Dept: [phone]     Georgi Sy MD  10/18/22 [id number]

## 2022-10-18 NOTE — ED TRIAGE NOTES
Pt states he had a B/P of 180s systolic at 1415. He had taken his Lisinopril 20 mg this morning. He called the nurse triage line and was told to take an additional Lisinopril 20 mg, which he did at 1630. He was told to wait an hour and take his B/P, which was 222/111. Pt denies HA, denies chest pain, denies shortness of breath, denies vision changes, and denies numbness and tingling. Pt states around 1530 he felt like his face was flushed for 30 minutes.     Triage Assessment     Row Name 10/18/22 7002       Triage Assessment (Adult)    Airway WDL WDL       Respiratory WDL    Respiratory WDL WDL       Skin Circulation/Temperature WDL    Skin Circulation/Temperature WDL WDL       Cardiac WDL    Cardiac WDL WDL       Peripheral/Neurovascular WDL    Peripheral Neurovascular WDL WDL       Cognitive/Neuro/Behavioral WDL    Cognitive/Neuro/Behavioral WDL WDL

## 2022-10-19 ENCOUNTER — APPOINTMENT (OUTPATIENT)
Dept: CARDIOLOGY | Facility: HOSPITAL | Age: 87
DRG: 304 | End: 2022-10-19
Attending: HOSPITALIST
Payer: COMMERCIAL

## 2022-10-19 ENCOUNTER — TELEPHONE (OUTPATIENT)
Dept: CARDIOLOGY | Facility: CLINIC | Age: 87
End: 2022-10-19

## 2022-10-19 ENCOUNTER — APPOINTMENT (OUTPATIENT)
Dept: OCCUPATIONAL THERAPY | Facility: HOSPITAL | Age: 87
DRG: 304 | End: 2022-10-19
Attending: HOSPITALIST
Payer: COMMERCIAL

## 2022-10-19 DIAGNOSIS — I50.9 ACUTE DECOMPENSATED HEART FAILURE (H): Primary | ICD-10-CM

## 2022-10-19 PROBLEM — I16.0 HYPERTENSIVE URGENCY: Status: ACTIVE | Noted: 2022-10-19

## 2022-10-19 PROBLEM — I50.23 ACUTE ON CHRONIC SYSTOLIC CONGESTIVE HEART FAILURE (H): Status: ACTIVE | Noted: 2022-10-19

## 2022-10-19 PROBLEM — I48.19 PERSISTENT ATRIAL FIBRILLATION (H): Status: ACTIVE | Noted: 2022-10-19

## 2022-10-19 LAB
ANION GAP SERPL CALCULATED.3IONS-SCNC: 10 MMOL/L (ref 7–15)
ATRIAL RATE - MUSE: 97 BPM
BASOPHILS # BLD AUTO: 0.1 10E3/UL (ref 0–0.2)
BASOPHILS NFR BLD AUTO: 0 %
BUN SERPL-MCNC: 13.7 MG/DL (ref 8–23)
CALCIUM SERPL-MCNC: 9.4 MG/DL (ref 8.2–9.6)
CHLORIDE SERPL-SCNC: 97 MMOL/L (ref 98–107)
CREAT SERPL-MCNC: 0.79 MG/DL (ref 0.67–1.17)
DEPRECATED HCO3 PLAS-SCNC: 27 MMOL/L (ref 22–29)
DIASTOLIC BLOOD PRESSURE - MUSE: NORMAL MMHG
EOSINOPHIL # BLD AUTO: 0 10E3/UL (ref 0–0.7)
EOSINOPHIL NFR BLD AUTO: 0 %
ERYTHROCYTE [DISTWIDTH] IN BLOOD BY AUTOMATED COUNT: 12.8 % (ref 10–15)
GFR SERPL CREATININE-BSD FRML MDRD: 83 ML/MIN/1.73M2
GLUCOSE SERPL-MCNC: 92 MG/DL (ref 70–99)
HCT VFR BLD AUTO: 42.8 % (ref 40–53)
HGB BLD-MCNC: 14.1 G/DL (ref 13.3–17.7)
HOLD SPECIMEN: NORMAL
IMM GRANULOCYTES # BLD: 0.1 10E3/UL
IMM GRANULOCYTES NFR BLD: 1 %
INR PPP: 3.91 (ref 0.85–1.15)
INTERPRETATION ECG - MUSE: NORMAL
LVEF ECHO: NORMAL
LYMPHOCYTES # BLD AUTO: 0.9 10E3/UL (ref 0.8–5.3)
LYMPHOCYTES NFR BLD AUTO: 6 %
MAGNESIUM SERPL-MCNC: 1.8 MG/DL (ref 1.7–2.3)
MAGNESIUM SERPL-MCNC: 1.9 MG/DL (ref 1.7–2.3)
MCH RBC QN AUTO: 32.3 PG (ref 26.5–33)
MCHC RBC AUTO-ENTMCNC: 32.9 G/DL (ref 31.5–36.5)
MCV RBC AUTO: 98 FL (ref 78–100)
MONOCYTES # BLD AUTO: 0.8 10E3/UL (ref 0–1.3)
MONOCYTES NFR BLD AUTO: 6 %
NEUTROPHILS # BLD AUTO: 12.2 10E3/UL (ref 1.6–8.3)
NEUTROPHILS NFR BLD AUTO: 87 %
NRBC # BLD AUTO: 0 10E3/UL
NRBC BLD AUTO-RTO: 0 /100
P AXIS - MUSE: NORMAL DEGREES
PLATELET # BLD AUTO: 288 10E3/UL (ref 150–450)
POTASSIUM SERPL-SCNC: 3.9 MMOL/L (ref 3.4–5.3)
POTASSIUM SERPL-SCNC: 4 MMOL/L (ref 3.4–5.3)
PR INTERVAL - MUSE: NORMAL MS
QRS DURATION - MUSE: 90 MS
QT - MUSE: 370 MS
QTC - MUSE: 429 MS
R AXIS - MUSE: 68 DEGREES
RBC # BLD AUTO: 4.36 10E6/UL (ref 4.4–5.9)
SODIUM SERPL-SCNC: 134 MMOL/L (ref 136–145)
SYSTOLIC BLOOD PRESSURE - MUSE: NORMAL MMHG
T AXIS - MUSE: 9 DEGREES
VENTRICULAR RATE- MUSE: 81 BPM
WBC # BLD AUTO: 14 10E3/UL (ref 4–11)

## 2022-10-19 PROCEDURE — 85610 PROTHROMBIN TIME: CPT | Performed by: HOSPITALIST

## 2022-10-19 PROCEDURE — 210N000001 HC R&B IMCU HEART CARE

## 2022-10-19 PROCEDURE — 250N000013 HC RX MED GY IP 250 OP 250 PS 637: Performed by: HOSPITALIST

## 2022-10-19 PROCEDURE — 97166 OT EVAL MOD COMPLEX 45 MIN: CPT | Mod: GO

## 2022-10-19 PROCEDURE — 93306 TTE W/DOPPLER COMPLETE: CPT | Mod: 26 | Performed by: INTERNAL MEDICINE

## 2022-10-19 PROCEDURE — 85025 COMPLETE CBC W/AUTO DIFF WBC: CPT | Performed by: INTERNAL MEDICINE

## 2022-10-19 PROCEDURE — 250N000011 HC RX IP 250 OP 636: Performed by: HOSPITALIST

## 2022-10-19 PROCEDURE — 250N000013 HC RX MED GY IP 250 OP 250 PS 637: Performed by: EMERGENCY MEDICINE

## 2022-10-19 PROCEDURE — 36415 COLL VENOUS BLD VENIPUNCTURE: CPT | Performed by: HOSPITALIST

## 2022-10-19 PROCEDURE — 99223 1ST HOSP IP/OBS HIGH 75: CPT | Mod: AI | Performed by: HOSPITALIST

## 2022-10-19 PROCEDURE — 97535 SELF CARE MNGMENT TRAINING: CPT | Mod: GO

## 2022-10-19 PROCEDURE — G0378 HOSPITAL OBSERVATION PER HR: HCPCS

## 2022-10-19 PROCEDURE — 36415 COLL VENOUS BLD VENIPUNCTURE: CPT | Performed by: INTERNAL MEDICINE

## 2022-10-19 PROCEDURE — 83735 ASSAY OF MAGNESIUM: CPT

## 2022-10-19 PROCEDURE — 99207 PR APP CREDIT; MD BILLING SHARED VISIT: CPT | Performed by: INTERNAL MEDICINE

## 2022-10-19 PROCEDURE — 99223 1ST HOSP IP/OBS HIGH 75: CPT | Performed by: INTERNAL MEDICINE

## 2022-10-19 PROCEDURE — 93306 TTE W/DOPPLER COMPLETE: CPT

## 2022-10-19 RX ORDER — LIDOCAINE 40 MG/G
CREAM TOPICAL
Status: DISCONTINUED | OUTPATIENT
Start: 2022-10-19 | End: 2022-10-24 | Stop reason: HOSPADM

## 2022-10-19 RX ORDER — SIMVASTATIN 10 MG
20 TABLET ORAL AT BEDTIME
Status: DISCONTINUED | OUTPATIENT
Start: 2022-10-19 | End: 2022-10-24 | Stop reason: HOSPADM

## 2022-10-19 RX ORDER — FINASTERIDE 5 MG/1
5 TABLET, FILM COATED ORAL DAILY
Status: DISCONTINUED | OUTPATIENT
Start: 2022-10-19 | End: 2022-10-24 | Stop reason: HOSPADM

## 2022-10-19 RX ORDER — ACETAMINOPHEN 325 MG/1
650 TABLET ORAL EVERY 6 HOURS PRN
Status: DISCONTINUED | OUTPATIENT
Start: 2022-10-19 | End: 2022-10-24 | Stop reason: HOSPADM

## 2022-10-19 RX ORDER — TAMSULOSIN HYDROCHLORIDE 0.4 MG/1
0.4 CAPSULE ORAL 2 TIMES DAILY
Status: DISCONTINUED | OUTPATIENT
Start: 2022-10-19 | End: 2022-10-24 | Stop reason: HOSPADM

## 2022-10-19 RX ORDER — PRIMIDONE 50 MG/1
50 TABLET ORAL 3 TIMES DAILY
Status: DISCONTINUED | OUTPATIENT
Start: 2022-10-19 | End: 2022-10-24 | Stop reason: HOSPADM

## 2022-10-19 RX ORDER — ONDANSETRON 4 MG/1
4 TABLET, ORALLY DISINTEGRATING ORAL EVERY 6 HOURS PRN
Status: DISCONTINUED | OUTPATIENT
Start: 2022-10-19 | End: 2022-10-24 | Stop reason: HOSPADM

## 2022-10-19 RX ORDER — LISINOPRIL 20 MG/1
40 TABLET ORAL DAILY
Status: DISCONTINUED | OUTPATIENT
Start: 2022-10-19 | End: 2022-10-23

## 2022-10-19 RX ORDER — PROPRANOLOL HYDROCHLORIDE 20 MG/1
20 TABLET ORAL 2 TIMES DAILY
Status: DISCONTINUED | OUTPATIENT
Start: 2022-10-19 | End: 2022-10-19

## 2022-10-19 RX ORDER — DOXAZOSIN 1 MG/1
2 TABLET ORAL DAILY
Status: DISCONTINUED | OUTPATIENT
Start: 2022-10-19 | End: 2022-10-24 | Stop reason: HOSPADM

## 2022-10-19 RX ORDER — ACETAMINOPHEN 650 MG/1
650 SUPPOSITORY RECTAL EVERY 6 HOURS PRN
Status: DISCONTINUED | OUTPATIENT
Start: 2022-10-19 | End: 2022-10-24 | Stop reason: HOSPADM

## 2022-10-19 RX ORDER — FUROSEMIDE 10 MG/ML
40 INJECTION INTRAMUSCULAR; INTRAVENOUS EVERY 12 HOURS
Status: DISCONTINUED | OUTPATIENT
Start: 2022-10-19 | End: 2022-10-20

## 2022-10-19 RX ORDER — HYDRALAZINE HYDROCHLORIDE 20 MG/ML
10 INJECTION INTRAMUSCULAR; INTRAVENOUS EVERY 4 HOURS PRN
Status: DISCONTINUED | OUTPATIENT
Start: 2022-10-19 | End: 2022-10-24 | Stop reason: HOSPADM

## 2022-10-19 RX ORDER — ONDANSETRON 2 MG/ML
4 INJECTION INTRAMUSCULAR; INTRAVENOUS EVERY 6 HOURS PRN
Status: DISCONTINUED | OUTPATIENT
Start: 2022-10-19 | End: 2022-10-24 | Stop reason: HOSPADM

## 2022-10-19 RX ADMIN — METOPROLOL TARTRATE 12.5 MG: 25 TABLET, FILM COATED ORAL at 07:58

## 2022-10-19 RX ADMIN — NITROGLYCERIN 1 PATCH: 0.4 PATCH TRANSDERMAL at 00:02

## 2022-10-19 RX ADMIN — PRIMIDONE 50 MG: 50 TABLET ORAL at 07:57

## 2022-10-19 RX ADMIN — PRIMIDONE 50 MG: 50 TABLET ORAL at 21:25

## 2022-10-19 RX ADMIN — FUROSEMIDE 40 MG: 10 INJECTION, SOLUTION INTRAMUSCULAR; INTRAVENOUS at 21:08

## 2022-10-19 RX ADMIN — PRIMIDONE 50 MG: 50 TABLET ORAL at 13:41

## 2022-10-19 RX ADMIN — FINASTERIDE 5 MG: 5 TABLET, FILM COATED ORAL at 07:57

## 2022-10-19 RX ADMIN — FUROSEMIDE 40 MG: 10 INJECTION, SOLUTION INTRAMUSCULAR; INTRAVENOUS at 08:02

## 2022-10-19 RX ADMIN — TAMSULOSIN HYDROCHLORIDE 0.4 MG: 0.4 CAPSULE ORAL at 07:57

## 2022-10-19 RX ADMIN — METOPROLOL TARTRATE 12.5 MG: 25 TABLET, FILM COATED ORAL at 21:26

## 2022-10-19 RX ADMIN — DOXAZOSIN 2 MG: 1 TABLET ORAL at 07:57

## 2022-10-19 RX ADMIN — LISINOPRIL 40 MG: 20 TABLET ORAL at 07:57

## 2022-10-19 RX ADMIN — SIMVASTATIN 20 MG: 10 TABLET, FILM COATED ORAL at 22:20

## 2022-10-19 RX ADMIN — PROPRANOLOL HYDROCHLORIDE 20 MG: 20 TABLET ORAL at 07:58

## 2022-10-19 RX ADMIN — TAMSULOSIN HYDROCHLORIDE 0.4 MG: 0.4 CAPSULE ORAL at 21:24

## 2022-10-19 ASSESSMENT — ACTIVITIES OF DAILY LIVING (ADL)
ADLS_ACUITY_SCORE: 42
ADLS_ACUITY_SCORE: 37
DEPENDENT_IADLS:: INDEPENDENT
ADLS_ACUITY_SCORE: 37
ADLS_ACUITY_SCORE: 35
ADLS_ACUITY_SCORE: 42
ADLS_ACUITY_SCORE: 42
ADLS_ACUITY_SCORE: 35
ADLS_ACUITY_SCORE: 35
ADLS_ACUITY_SCORE: 37

## 2022-10-19 NOTE — CONSULTS
Care Management Initial Consult    General Information  Assessment completed with: Patient, pt  Type of CM/SW Visit: Initial Assessment    Primary Care Provider verified and updated as needed: Yes   Readmission within the last 30 days: no previous admission in last 30 days   Return Category: Exacerbation of disease  Reason for Consult: discharge planning  Advance Care Planning: Advance Care Planning Reviewed: verified with patient, questions answered (Reports he wants CPR, defibrillate and drugs; no intubation)          Communication Assessment  Patient's communication style: spoken language (English or Bilingual)             Cognitive  Cognitive/Neuro/Behavioral: WDL                      Living Environment:   People in home: alone     Current living Arrangements: apartment      Able to return to prior arrangements: yes       Family/Social Support:  Care provided by: self  Provides care for: no one  Marital Status:   Children          Description of Support System: Supportive, Involved (6 living children, 3 live locally)    Support Assessment: Adequate family and caregiver support, Adequate social supports, Patient communicates needs well met    Current Resources:   Patient receiving home care services: No     Community Resources: DME  Equipment currently used at home: walker, rolling, cane, straight, grab bar, toilet, grab bar, tub/shower, shower chair  Supplies currently used at home:      Employment/Financial:  Employment Status:          Financial Concerns:             Lifestyle & Psychosocial Needs:  Social Determinants of Health     Tobacco Use: Medium Risk     Smoking Tobacco Use: Former     Smokeless Tobacco Use: Never     Passive Exposure: Not on file   Alcohol Use: Not on file   Financial Resource Strain: Not on file   Food Insecurity: Not on file   Transportation Needs: Not on file   Physical Activity: Not on file   Stress: Not on file   Social Connections: Not on file   Intimate Partner Violence:  Not on file   Depression: Not on file   Housing Stability: Not on file       Functional Status:  Prior to admission patient needed assistance:   Dependent ADLs:: Independent  Dependent IADLs:: Independent (Drives short distances)  Assesssment of Functional Status: At functional baseline    Mental Health Status:          Chemical Dependency Status:                Values/Beliefs:  Spiritual, Cultural Beliefs, Evangelical Practices, Values that affect care:                 Additional Information:  Lives independently in a senior apartment complex. Building does not have any services.    Spouse  suddenly in . She did not wake from a nap, spouse was present at the time. He has all of her DME in the home.     3 of 6 children live local. (had 7, one child )    Family will transport at discharge.        Kenia Estevez RN

## 2022-10-19 NOTE — ED PROVIDER NOTES
NAME: Carlos Rubio  AGE: 89 year old male  YOB: 1930  MRN: [id number]  EVALUATION DATE & TIME: 10/18/2022  8:01 PM    PCP: Kyree Bojorquez    ED PROVIDER: Uriel Serna M.D.      Chief Complaint   Patient presents with     Hypertension     FINAL IMPRESSION:  1. Hypertensive urgency    2. Acute on chronic systolic congestive heart failure (H)    3. Persistent atrial fibrillation (H)        MEDICAL DECISION MAKIN:02 PM I met with the patient, obtained history, performed an initial exam, and discussed options and plan for diagnostics and treatment here in the ED.   10:08 PM I rechecked and updated patient on results. Discussed plan for admission.  10:24 PM I discussed case with Dr. Ross, hospitalist. Recommends trying to get patient's blood pressure down.    Patient was clinically assessed and consented to treatment. After assessment, medical decision making and workup were discussed with the patient. The patient was agreeable to plan for testing, workup, and treatment.  Pertinent Labs & Imaging studies reviewed. (See chart for details)     Medical Decision Making    Supplemental history from: Family Member    External Record(s) Reviewed: Inpatient Record and Outpatient Record    Differential Diagnosis: See MDM charting for differential considered.     I performed an independent interpretation of the: EKG: See my documentation.    Discussed with radiology regarding test interpretation: N/A    Discussion of management with another provider: Hospitalist    The following testing was considered but ultimately not selected: None     I considered prescription management with: N/A    The patient's care impacted: None    Consideration of Admission/Observation: N/A - Patient admitted or discharged without consideration for admission    Care significantly affected by Social Determinants of Health including: N/A    Carlos Rubio is a 89 year old male who presents with hypertension.   Differential  diagnosis includes but not limited to uncontrolled hypertension, CHF exacerbation, acute coronary syndrome, intracranial hemorrhage, renal failure.  Patient with elevated blood pressure today and not responding to outpatient medications.  Patient does have bilateral lower extremity swelling and I am concerned about fluid overload.  Labs and EKG obtained from triage showed chronic SVT with PVCs.  Troponin initially unremarkable for significant elevation.  Labs did not show any renal failure and CBC stable.  INR was slightly elevated though patient is on Coumadin for his A. fib.  Patient reports dyspnea on exertion and with the lower extremity swelling as well as of hypertension I am concerned about hypertensive urgency and possibly CHF exacerbation with fluid overload.  He does not appear in acute distress presently but will plan for chest x-ray and BNP.  BNP was elevated and chest x-ray did show some patchiness which I feels likely possibly fluid backed up.  Patient given Lasix and hydralazine.  Initially no change in blood pressure was still over 200 however slowly with the Lasix and then hydralazine blood pressure started coming down and dropped to 160.  Patient however did rebound after the hydralazine wore off and was given nitroglycerin patch which helped more and slowly brought blood pressure down into the 150s/160s.  Patient with no chest pain and diuresing well with Lasix.  After discussion with patient will be plan for admission of discussed with hospitalist for admission.    0 minutes of critical care time    MEDICATIONS GIVEN IN THE EMERGENCY:  Medications   nitroGLYcerin (NITRODUR) 0.4 MG/HR 24 hr patch 1 patch (1 patch Transdermal Patch/Med Applied 10/19/22 0002)   nitroGLYcerin (NITRODUR) Patch in Place (1 each Transdermal Patch in Place 10/18/22 2350)   doxazosin (CARDURA) tablet 2 mg (has no administration in time range)   finasteride (PROSCAR) tablet 5 mg (has no administration in time range)    lisinopril (ZESTRIL) tablet 40 mg (has no administration in time range)   metoprolol tartrate (LOPRESSOR) half-tab 12.5 mg (has no administration in time range)   primidone (MYSOLINE) tablet 50 mg (has no administration in time range)   propranolol (INDERAL) tablet 20 mg (has no administration in time range)   simvastatin (ZOCOR) tablet 20 mg (has no administration in time range)   tamsulosin (FLOMAX) capsule 0.4 mg (has no administration in time range)   lidocaine 1 % 0.1-1 mL (has no administration in time range)   lidocaine (LMX4) cream (has no administration in time range)   sodium chloride (PF) 0.9% PF flush 3 mL (3 mLs Intracatheter Given 10/19/22 0113)   sodium chloride (PF) 0.9% PF flush 3 mL (has no administration in time range)   melatonin tablet 1 mg (has no administration in time range)   Patient is already receiving anticoagulation with heparin, enoxaparin (LOVENOX), warfarin (COUMADIN)  or other anticoagulant medication (has no administration in time range)   acetaminophen (TYLENOL) tablet 650 mg (has no administration in time range)     Or   acetaminophen (TYLENOL) Suppository 650 mg (has no administration in time range)   ondansetron (ZOFRAN ODT) ODT tab 4 mg (has no administration in time range)     Or   ondansetron (ZOFRAN) injection 4 mg (has no administration in time range)   furosemide (LASIX) injection 40 mg (has no administration in time range)   Continuing ACE inhibitor/ARB/ARNI from home medication list OR ACE inhibitor/ARB/ARNI order already placed during this visit (has no administration in time range)   Continuing beta blocker from home medication list OR beta blocker order already placed during this visit (has no administration in time range)   hydrALAZINE (APRESOLINE) injection 10 mg (has no administration in time range)   Warfarin Dose Required Daily - Pharmacist Managed (has no administration in time range)   warfarin-No DOSE today (has no administration in time range)   metoprolol  "tartrate (LOPRESSOR) tablet 25 mg (25 mg Oral Given 10/18/22 1904)   metoprolol tartrate (LOPRESSOR) half-tab 12.5 mg ( Oral Not Given 10/18/22 2132)   hydrALAZINE (APRESOLINE) injection 10 mg (10 mg Intravenous Given 10/18/22 2134)   furosemide (LASIX) injection 40 mg (40 mg Intravenous Given 10/18/22 2229)       NEW PRESCRIPTIONS STARTED AT TODAY'S ER VISIT:  New Prescriptions    No medications on file          =================================================================    HPI    Patient information was obtained from: Patient    Use of : N/A         Carlos Rubio is a 89 year old male with a past medical history of CAD, acute on chronic diastolic congestive heart failure, HTN, atrial fibrillation, dissection of right vertebral artery, diabetes, Sicca syndrome, basal cell carcinoma, who presents to the ED via walk-in for the evaluation of hypertension.    Patient reports his blood pressure this afternoon around 1415 was 180's systolic, after he had taken his Lisinopril. Patient states he called the nurse triage line, and was told to take an additional 20 mg Lisinopril, which he did at 1630. Patient was told to wait an hour and then take his blood pressure, which was found to be 222/111. He notes that around 1530, he felt as if his face was flushed for about 30 minutes. Otherwise, he denies any headache, chest pain, shortness of breath, vision changes, numbness, and tingling. No other complaints at this time.    REVIEW OF SYSTEMS   Review of Systems   Constitutional:        Positive for \"flushed\" sensation   Eyes: Negative for visual disturbance.   Respiratory: Negative for shortness of breath.    Cardiovascular: Negative for chest pain.   Neurological: Negative for numbness and headaches.        Negative for tingling   All other systems reviewed and are negative.     PAST MEDICAL HISTORY:  Past Medical History:   Diagnosis Date     A-fib (H)     on coumadin     ARF (acute renal failure) (H) 1979 "    States both his kidneys quit. Was going to start dialysis when they started working again.     Arthritis      Basal cell carcinoma      Carpal tunnel syndrome      Diabetes (H)     [facility]USA SAID HE DOES NOT HAVE DIABETES 12/11/15.     Heart failure (H)      HTN (hypertension)      Psoriasis      Psoriatic arthritis (H)      Sicca syndrome (H)     MUSA SAID HE DOES NOT HAVE/NEVER HAD SICCA SYNDROME 12/11/15.       PAST SURGICAL HISTORY:  Past Surgical History:   Procedure Laterality Date     ARTHROSCOPY KNEE       IR MISCELLANEOUS PROCEDURE  10/27/2008     IR MISCELLANEOUS PROCEDURE  10/27/2008     RELEASE CARPAL TUNNEL Left        CURRENT MEDICATIONS:      Current Facility-Administered Medications:      acetaminophen (TYLENOL) tablet 650 mg, 650 mg, Oral, Q6H PRN **OR** acetaminophen (TYLENOL) Suppository 650 mg, 650 mg, Rectal, Q6H PRN, Alexandro Ross MD     Continuing ACE inhibitor/ARB/ARNI from home medication list OR ACE inhibitor/ARB/ARNI order already placed during this visit, , Does not apply, DOES NOT GO TO Vandana SHIRLEY Mopelola I, MD     Continuing beta blocker from home medication list OR beta blocker order already placed during this visit, , Does not apply, DOES NOT GO TO Vandana SHIRLEY Mopelola I, MD     doxazosin (CARDURA) tablet 2 mg, 2 mg, Oral, Daily, Alexandro Ross MD     finasteride (PROSCAR) tablet 5 mg, 5 mg, Oral, Daily, Alexandro Ross MD     furosemide (LASIX) injection 40 mg, 40 mg, Intravenous, Q12H, Alexandro Ross MD     hydrALAZINE (APRESOLINE) injection 10 mg, 10 mg, Intravenous, Q4H PRN, Alexandro Ross MD     lidocaine (LMX4) cream, , Topical, Q1H PRN, Alexandro Ross MD     lidocaine 1 % 0.1-1 mL, 0.1-1 mL, Other, Q1H PRN, Alexandro Ross MD     lisinopril (ZESTRIL) tablet 40 mg, 40 mg, Oral, Daily, Alexandro Ross MD     melatonin tablet 1 mg, 1 mg, Oral, At Bedtime PRN, Alexandro Ross MD     metoprolol tartrate (LOPRESSOR) half-tab 12.5 mg, 12.5 mg,  Oral, BID, Alexandro Ross MD     nitroGLYcerin (NITRODUR) 0.4 MG/HR 24 hr patch 1 patch, 1 patch, Transdermal, Once, WallUriel MD, 1 patch at 10/19/22 0002     nitroGLYcerin (NITRODUR) Patch in Place, , Transdermal, Q8H FLORENCIO, Alexandro Ross MD     ondansetron (ZOFRAN ODT) ODT tab 4 mg, 4 mg, Oral, Q6H PRN **OR** ondansetron (ZOFRAN) injection 4 mg, 4 mg, Intravenous, Q6H PRN, Alexandro Ross MD     Patient is already receiving anticoagulation with heparin, enoxaparin (LOVENOX), warfarin (COUMADIN)  or other anticoagulant medication, , Does not apply, Continuous PRN, Alexandro Ross MD     primidone (MYSOLINE) tablet 50 mg, 50 mg, Oral, TID, Alexandro Ross MD     propranolol (INDERAL) tablet 20 mg, 20 mg, Oral, BID, Alexandro Ross MD     simvastatin (ZOCOR) tablet 20 mg, 20 mg, Oral, At Bedtime, Alexandro Ross MD     sodium chloride (PF) 0.9% PF flush 3 mL, 3 mL, Intracatheter, Q8H, Alexandro Ross MD, 3 mL at 10/19/22 0113     sodium chloride (PF) 0.9% PF flush 3 mL, 3 mL, Intracatheter, q1 min prn, Alexandro Ross MD     tamsulosin (FLOMAX) capsule 0.4 mg, 0.4 mg, Oral, BID, Alexandro Ross MD     Warfarin Dose Required Daily - Pharmacist Managed, 1 each, Oral, See Admin Instructions, Alexandro Ross MD    Current Outpatient Medications:      Cholecalciferol (VITAMIN D3 PO), Take by mouth daily, Disp: , Rfl:      doxazosin (CARDURA) 4 MG tablet, Take 2 mg by mouth daily , Disp: , Rfl:      finasteride (PROSCAR) 5 MG tablet, Take 5 mg by mouth daily, Disp: , Rfl:      lisinopril (ZESTRIL) 40 MG tablet, TAKE 1 TABLET BY MOUTH EVERY DAY, Disp: 90 tablet, Rfl: 3     primidone (MYSOLINE) 50 MG tablet, Take 50 mg by mouth 3 times daily, Disp: , Rfl:      propranolol (INDERAL) 20 MG tablet, Take 20 mg by mouth 2 times daily, Disp: , Rfl:      simvastatin (ZOCOR) 40 MG tablet, Take by mouth At Bedtime, Disp: , Rfl:      spironolactone (ALDACTONE) 25 MG tablet, Take 25  mg by mouth daily, Disp: , Rfl:      tamsulosin (FLOMAX) 0.4 MG capsule, Take 0.4 mg by mouth 2 times daily, Disp: , Rfl:      torsemide (DEMADEX) 20 MG tablet, Take 1 tablet by mouth daily, Disp: , Rfl:      warfarin (COUMADIN) 5 MG tablet, 1.5 tabs on MWF and 2 tablets on Abdalla,Tu,Th,Sa, Disp: , Rfl:      metoprolol tartrate (LOPRESSOR) 25 MG tablet, Take 0.5 tablets (12.5 mg) by mouth 2 times daily (Patient not taking: Reported on 10/18/2022), Disp: 180 tablet, Rfl: 3    ALLERGIES:  Allergies   Allergen Reactions     Bactrim [Sulfamethoxazole W/Trimethoprim]        FAMILY HISTORY:  Family History   Problem Relation Age of Onset     Cerebrovascular Disease Mother 76.00     Cerebrovascular Disease Father 81.00     Pacemaker Brother 82.00       SOCIAL HISTORY:   Social History     Socioeconomic History     Marital status:    Tobacco Use     Smoking status: Former     Packs/day: 0.50     Years: 20.00     Pack years: 10.00     Types: Cigarettes     Quit date: 1975     Years since quittin.8     Smokeless tobacco: Never     Tobacco comments:     Smoked a pipe from  to about  (or maybe  - he can't remember)   Substance and Sexual Activity     Alcohol use: Yes     Alcohol/week: 1.0 standard drink     Comment: Alcoholic Drinks/day: Occasionally     Drug use: No     PHYSICAL EXAM:    Vitals: BP (!) 154/70   Pulse 75   Temp 97.6  F (36.4  C) (Oral)   Resp (!) 40   Wt 81.6 kg (179 lb 14.3 oz)   SpO2 95%   BMI 28.60 kg/m     Physical Exam  Vitals and nursing note reviewed.   Constitutional:       General: He is not in acute distress.     Appearance: Normal appearance. He is normal weight. He is not ill-appearing or toxic-appearing.   HENT:      Head: Normocephalic.   Eyes:      Extraocular Movements: Extraocular movements intact.      Pupils: Pupils are equal, round, and reactive to light.   Neck:      Comments: No JVD  Cardiovascular:      Rate and Rhythm: Normal rate. Rhythm irregular.       Heart sounds: Normal heart sounds.   Pulmonary:      Effort: Pulmonary effort is normal. No respiratory distress.      Breath sounds: Normal breath sounds. No stridor. No wheezing or rhonchi.   Abdominal:      Palpations: Abdomen is soft.      Tenderness: There is no abdominal tenderness. There is no right CVA tenderness, left CVA tenderness, guarding or rebound.   Musculoskeletal:      Cervical back: Normal range of motion and neck supple.      Right lower leg: Edema present.      Left lower leg: Edema present.   Skin:     General: Skin is warm and dry.   Neurological:      General: No focal deficit present.      Mental Status: He is alert and oriented to person, place, and time.      Motor: No weakness.      Coordination: Coordination normal.      Gait: Gait normal.   Psychiatric:         Mood and Affect: Mood normal.        LAB:  All pertinent labs reviewed and interpreted.  Labs Ordered and Resulted from Time of ED Arrival to Time of ED Departure   INR - Abnormal       Result Value    INR 4.49 (*)    BASIC METABOLIC PANEL - Abnormal    Sodium 136      Potassium 4.0      Chloride 96 (*)     Carbon Dioxide (CO2) 29      Anion Gap 11      Urea Nitrogen 13.4      Creatinine 0.82      Calcium 9.6      Glucose 113 (*)     GFR Estimate 82     TROPONIN T, HIGH SENSITIVITY - Abnormal    Troponin T, High Sensitivity 27 (*)    TROPONIN T, HIGH SENSITIVITY - Abnormal    Troponin T, High Sensitivity 27 (*)    CBC WITH PLATELETS AND DIFFERENTIAL - Abnormal    WBC Count 11.1 (*)     RBC Count 4.64      Hemoglobin 15.1      Hematocrit 46.1      MCV 99      MCH 32.5      MCHC 32.8      RDW 12.9      Platelet Count 295      % Neutrophils 79      % Lymphocytes 11      % Monocytes 7      % Eosinophils 1      % Basophils 1      % Immature Granulocytes 1      NRBCs per 100 WBC 0      Absolute Neutrophils 9.0 (*)     Absolute Lymphocytes 1.3      Absolute Monocytes 0.7      Absolute Eosinophils 0.1      Absolute Basophils 0.1       Absolute Immature Granulocytes 0.1      Absolute NRBCs 0.0     NT PROBNP INPATIENT - Abnormal    N terminal Pro BNP Inpatient 4,025 (*)    HEPATIC FUNCTION PANEL - Normal    Protein Total 7.6      Albumin 3.9      Bilirubin Total 0.5      Alkaline Phosphatase 104      AST 24      ALT 13      Bilirubin Direct <0.20     MAGNESIUM - Normal    Magnesium 1.9     TSH WITH FREE T4 REFLEX - Normal    TSH 2.23     COVID-19 VIRUS (CORONAVIRUS) BY PCR - Normal    SARS CoV2 PCR Negative     POTASSIUM - Normal    Potassium 3.9     INR     RADIOLOGY:  XR Chest Port 1 View   Final Result   IMPRESSION: Interval development of patchy opacities in the lungs which could represent an infectious or an inflammatory process. No adenopathy or effusion. Mild cardiac enlargement. Normal pulmonary vascularity. Mild elevation of the right    hemidiaphragm. Benign bone island left seventh rib anteriorly. Healed fracture deformities of multiple left ribs. Degenerative changes both shoulders and the spine. Mildly atherosclerotic thoracic aorta. Monitoring leads have been placed. The patient is    slightly rotated.      Echocardiogram Complete    (Results Pending)     EKG:   Performed at: 6:58 PM on October 18, 2022  Impression: Atrial fibrillation with PVCs, no acute ST elevation.  Rate: 81 bpm  Rhythm: Atrial fibrillation with PVCs.  QRS Interval: 90 ms  QTc Interval: 429 ms  Comparison: Comparison prior EKG with chronic atrial fibrillation but PVCs present now.  I have independently reviewed and interpreted the EKG(s) documented above.     PROCEDURES:   Procedures       I, Mariah Su, am serving as a scribe to document services personally performed by Dr. Uriel Serna  based on my observation and the provider's statements to me. I, Uriel Serna MD attest that Mariah Su is acting in a scribe capacity, has observed my performance of the services and has documented them in accordance with my direction.      Uriel Serna  M.D.  Emergency Medicine  [facility] Emergency Department     Uriel Serna MD  10/19/22 6423

## 2022-10-19 NOTE — PLAN OF CARE
"  Problem: Plan of Care - These are the overarching goals to be used throughout the patient stay.    Goal: Plan of Care Review  Description: The Plan of Care Review/Shift note should be completed every shift.  The Outcome Evaluation is a brief statement about your assessment that the patient is improving, declining, or no change.  This information will be displayed automatically on your shift note.  Outcome: Progressing  Flowsheets (Taken 10/19/2022 1026)  Plan of Care Reviewed With: patient  Goal: Patient-Specific Goal (Individualized)  Description: You can add care plan individualizations to a care plan. Examples of Individualization might be:  \"Parent requests to be called daily at 9am for status\", \"I have a hard time hearing out of my right ear\", or \"Do not touch me to wake me up as it startles me\".  Outcome: Progressing  Goal: Absence of Hospital-Acquired Illness or Injury  Outcome: Progressing  Intervention: Prevent Skin Injury  Recent Flowsheet Documentation  Taken 10/19/2022 0945 by Klaus Kim RN  Body Position: position changed independently  Goal: Optimal Comfort and Wellbeing  Outcome: Progressing  Goal: Readiness for Transition of Care  Outcome: Progressing     Problem: Risk for Delirium  Goal: Optimal Coping  Outcome: Progressing  Goal: Improved Behavioral Control  Outcome: Progressing  Goal: Improved Attention and Thought Clarity  Outcome: Progressing  Goal: Improved Sleep  Outcome: Progressing     Problem: Plan of Care - These are the overarching goals to be used throughout the patient stay.    Goal: Plan of Care Review  Description: The Plan of Care Review/Shift note should be completed every shift.  The Outcome Evaluation is a brief statement about your assessment that the patient is improving, declining, or no change.  This information will be displayed automatically on your shift note.  Outcome: Progressing  Flowsheets (Taken 10/19/2022 1026)  Plan of Care Reviewed With: patient  Goal: " "Patient-Specific Goal (Individualized)  Description: You can add care plan individualizations to a care plan. Examples of Individualization might be:  \"Parent requests to be called daily at 9am for status\", \"I have a hard time hearing out of my right ear\", or \"Do not touch me to wake me up as it startles me\".  Outcome: Progressing  Goal: Absence of Hospital-Acquired Illness or Injury  Outcome: Progressing  Intervention: Prevent Skin Injury  Recent Flowsheet Documentation  Taken 10/19/2022 0945 by Klaus Kim RN  Body Position: position changed independently  Goal: Optimal Comfort and Wellbeing  Outcome: Progressing  Goal: Readiness for Transition of Care  Outcome: Progressing     Problem: Risk for Delirium  Goal: Optimal Coping  Outcome: Progressing  Goal: Improved Behavioral Control  Outcome: Progressing  Goal: Improved Attention and Thought Clarity  Outcome: Progressing  Goal: Improved Sleep  Outcome: Progressing   Goal Outcome Evaluation:      Plan of Care Reviewed With: patient           Pt is alert and oriented x4. Pt is med complaint. Pt denies pain. Pt's v/s is stable. No complain. Continue to monitor pt.      "

## 2022-10-19 NOTE — H&P
[facility]    History and Physical - Hospitalist Service       Date of Admission:  10/18/2022    Assessment & Plan      Carlos Rubio is a 89 year old male admitted on 10/18/2022. He comes in with leg swelling and elevated BP.    1. Acute on Chronic CHF exacerbation  2.Hypertensive Urgency  3.chronic Atrial fibrillation  4.Chronic Anticoagulation with coumadin'  5.Psoriasis    PLAN    Admit to cardiac Monitored bed  Vital sighs Q4 and prn  Continuous pulse ox  Bedrest with bathroom privileges  Daily weights  Monitor I/O    1. Hypertensive Urgency  several doses of meds given in ed , SBP down to 190  NTG paste placed  Will continue home Lisinopril 40mg and cardura  Prn IV  hydralazine as needed    2. CHF acute on Chronic Diastolic  IV lasix BID  Monitor renal function  BNp in am, trend troponins  Cardiology consult  Last echo 2015  Summary  1. Normal left ventricular size and systolic performance. The ejection fraction is estimated to be 65%.  2. Left ventricular wall thickness is mildly increased.  3. There is moderate aortic valve sclerosis without significant valvular stenosis.  4. There is mild bi-atrial enlargement.  When compared to the prior real-time echocardiogram dated 16 May 2014, there has been little appreciable interval change.    3.Atrial Fibrillation  Rate is controlled  Continue Propranolol  Pt on coumadin    4. Supra therapeutic INR  Pharmacy to manage dosing  Goal INR 2-3    5.HLP- continue statin  Check lipid    6.BPH- continue flomax and proscar      .         Diet:  cardiac with fluid restrictions  DVT Prophylaxis: Warfarin  Sanders Catheter: Not present  Central Lines: None  Cardiac Monitoring: None  Code Status:   No ventilators    Clinically Significant Risk Factors Present on Admission       # Hypertension: home medication list includes antihypertensive(s)             Disposition Plan      Expected Discharge Date: 10/20/2022                The patient's care was  discussed with the Patient.    Alexandro Ross MD  Hospitalist Service  [facility]  Securely message with the [employer] Web Console (learn more here)  Text page via [employer] Paging/Directory         ______________________________________________________________________    Chief Complaint   Elevated BP    History is obtained from the patient    History of Present Illness   Carlos Rubio is a 89 year old male who has a hx of HTN,Atrial Fibrillation,HTN. He was at a social in his assisted living facility and became flushed and slightly dizzy.  He checked his Bp and was 220/110. He called his PCP office who recommended taking an extra dose of his lisinopril but had no effect so he was advised to come to ed.  He denies headache, chest pain, palpitations, nausea or vomiting.  He has had leg swelling for several weeks and is on Bumex.  He states he is complaint with his medications and diet.    Review of Systems    CONSTITUTIONAL:  negative for  fevers, chills, malaise and anorexia  HEENT:  negative  RESPIRATORY:  negative for  wheezing, hemoptysis, chest pain, pleuritic pain and cyanosis  Positive for mild cough and white phlegm  CARDIOVASCULAR:  negative for  chest pain, palpitations, fatigue  GASTROINTESTINAL:  negative for nausea, vomiting, constipation, hematemesis and hemtochezia  INTEGUMENT/BREAST:  negative for pruritus  ENDOCRINE:  negative for heat intolerance, cold intolerance, tremor, weight changes and change in bowel habits  MUSCULOSKELETAL:  negative for  joint swelling, stiff joints and decreased range of motion  NEUROLOGICAL:  negative    Past Medical History    I have reviewed this patient's medical history and updated it with pertinent information if needed.   Past Medical History:   Diagnosis Date     A-fib (H)     on coumadin     ARF (acute renal failure) (H) 1979    States both his kidneys quit. Was going to start dialysis when they started working again.     Arthritis      Basal  cell carcinoma      Carpal tunnel syndrome      Diabetes (H)     MUSA SAID HE DOES NOT HAVE DIABETES 12/11/15.     Heart failure (H)      HTN (hypertension)      Psoriasis      Psoriatic arthritis (H)      Sicca syndrome (H)     MUSA SAID HE DOES NOT HAVE/NEVER HAD SICCA SYNDROME 12/11/15.       Past Surgical History   I have reviewed this patient's surgical history and updated it with pertinent information if needed.  Past Surgical History:   Procedure Laterality Date     ARTHROSCOPY KNEE       IR MISCELLANEOUS PROCEDURE  10/27/2008     IR MISCELLANEOUS PROCEDURE  10/27/2008     RELEASE CARPAL TUNNEL Left        Social History   I have reviewed this patient's social history and updated it with pertinent information if needed.  Social History     Tobacco Use     Smoking status: Former     Packs/day: 0.50     Years: 20.00     Pack years: 10.00     Types: Cigarettes     Quit date: 1975     Years since quittin.8     Smokeless tobacco: Never     Tobacco comments:     Smoked a pipe from  to about  (or maybe  - he can't remember)   Substance Use Topics     Alcohol use: Yes     Alcohol/week: 1.0 standard drink     Comment: Alcoholic Drinks/day: Occasionally     Drug use: No       Family History   I have reviewed this patient's family history and updated it with pertinent information if needed.  Family History   Problem Relation Age of Onset     Cerebrovascular Disease Mother 76.00     Cerebrovascular Disease Father 81.00     Pacemaker Brother 82.00       Prior to Admission Medications   Prior to Admission Medications   Prescriptions Last Dose Informant Patient Reported? Taking?   Cholecalciferol (VITAMIN D3 PO) 10/18/2022 at pm  Yes Yes   Sig: Take by mouth daily   doxazosin (CARDURA) 4 MG tablet 10/17/2022 at pm  Yes Yes   Sig: Take 2 mg by mouth daily    finasteride (PROSCAR) 5 MG tablet 10/17/2022 at pm  Yes Yes   Sig: Take 5 mg by mouth daily   lisinopril (ZESTRIL) 40 MG tablet 10/18/2022 at  pm  No Yes   Sig: TAKE 1 TABLET BY MOUTH EVERY DAY   metoprolol tartrate (LOPRESSOR) 25 MG tablet Not Taking  No No   Sig: Take 0.5 tablets (12.5 mg) by mouth 2 times daily   Patient not taking: Reported on 10/18/2022   primidone (MYSOLINE) 50 MG tablet 10/17/2022 at pm  Yes Yes   Sig: Take 50 mg by mouth 3 times daily   propranolol (INDERAL) 20 MG tablet 10/18/2022 at am  Yes Yes   Sig: Take 20 mg by mouth 2 times daily   simvastatin (ZOCOR) 40 MG tablet 10/17/2022 at pm  Yes Yes   Sig: Take by mouth At Bedtime   spironolactone (ALDACTONE) 25 MG tablet 10/17/2022 at am  Yes Yes   Sig: Take 25 mg by mouth daily   tamsulosin (FLOMAX) 0.4 MG capsule 10/17/2022 at am  Yes Yes   Sig: Take 0.4 mg by mouth 2 times daily   torsemide (DEMADEX) 20 MG tablet 10/18/2022 at am  Yes Yes   Sig: Take 1 tablet by mouth daily   warfarin (COUMADIN) 5 MG tablet 10/17/2022 at pm  Yes Yes   Si.5 tabs on MWF and 2 tablets on Abdalla,Tu,Th,Sa      Facility-Administered Medications: None     Allergies   Allergies   Allergen Reactions     Bactrim [Sulfamethoxazole W/Trimethoprim]        Physical Exam   Vital Signs: Temp: 97.6  F (36.4  C) Temp src: Oral BP: (!) 193/86 Pulse: 73   Resp: (!) 38 SpO2: 93 % O2 Device: None (Room air)    Weight: 179 lbs 14.33 oz    Physical Exam  Vitals and nursing note reviewed.   Constitutional:       General: He is not in acute distress.     Appearance: Normal appearance. He is normal weight.   HENT:      Head: Normocephalic and atraumatic.      Nose: Nose normal.      Mouth/Throat:      Mouth: Mucous membranes are moist.      Pharynx: Oropharynx is clear.   Eyes:      Extraocular Movements: Extraocular movements intact.      Conjunctiva/sclera: Conjunctivae normal.      Pupils: Pupils are equal, round, and reactive to light.   Cardiovascular:      Rate and Rhythm: Normal rate. Rhythm irregular.      Pulses: Normal pulses.      Heart sounds: No murmur heard.  Pulmonary:      Effort: Pulmonary effort is normal.       Breath sounds: Rhonchi present.   Abdominal:      General: Abdomen is flat. Bowel sounds are normal. There is no distension.      Palpations: Abdomen is soft.      Tenderness: There is no abdominal tenderness.   Musculoskeletal:         General: Swelling present. Normal range of motion.      Cervical back: Normal range of motion and neck supple. No rigidity or tenderness.      Right lower leg: Edema present.      Left lower leg: Edema present.   Skin:     General: Skin is warm.      Capillary Refill: Capillary refill takes 2 to 3 seconds.      Coloration: Skin is not pale.      Findings: No erythema.   Neurological:      General: No focal deficit present.      Mental Status: He is alert and oriented to person, place, and time.      Cranial Nerves: No cranial nerve deficit.      Motor: No weakness.   Psychiatric:         Mood and Affect: Mood normal.         Behavior: Behavior normal.         Thought Content: Thought content normal.       Data   Data reviewed today: I reviewed all medications, new labs and imaging results over the last 24 hours. I personally reviewed the EKG tracing showing Atrial fibrillation, LVH,PVC.    Recent Labs   Lab 10/18/22  1901   WBC 11.1*   HGB 15.1   MCV 99      INR 4.49*      POTASSIUM 4.0   CHLORIDE 96*   CO2 29   BUN 13.4   CR 0.82   ANIONGAP 11   KIARA 9.6   *   ALBUMIN 3.9   PROTTOTAL 7.6   BILITOTAL 0.5   ALKPHOS 104   ALT 13   AST 24     Recent Results (from the past 24 hour(s))   XR Chest Port 1 View    Narrative    EXAM: XR CHEST PORT 1 VIEW  LOCATION: [facility]  DATE/TIME: 10/18/2022 10:14 PM    INDICATION: Shortness of breath. Evaluate for pulmonary edema.  COMPARISON: Chest x-ray 2 views 12/11/2015 at 1527 hours.      Impression    IMPRESSION: Interval development of patchy opacities in the lungs which could represent an infectious or an inflammatory process. No adenopathy or effusion. Mild cardiac enlargement. Normal pulmonary  vascularity. Mild elevation of the right   hemidiaphragm. Benign bone island left seventh rib anteriorly. Healed fracture deformities of multiple left ribs. Degenerative changes both shoulders and the spine. Mildly atherosclerotic thoracic aorta. Monitoring leads have been placed. The patient is   slightly rotated.       Alexandro Ross MD on 10/19/2022 at 12:48 AM

## 2022-10-19 NOTE — CONSULTS
HEART CARE NOTE        Thank you, Dr. Ross, for asking the [facility] Heart Care team to see Carlos Rubio to evaluate HFpEF c/b ADHF.      Assessment/Recommendations     1. HFpEF c/b ADHF  Assessment / Plan    Hypevolemic on physical exam - diuresing well on current regimen; no changes at this time    GDMT as detailed below; mainstay of treatment for HFpEF includes diuretics and adequate BP control (class I) and SGLT2-I (class 2a); additional medical therapy (ARNI, MRA, ARB) demonstrated less robust evidence for indication but may be considered per guideline recommendations (2b); no indication for BBlockers     Repeat echo pending    Current Pharmacotherapy AHA Guideline-Directed Medical Therapy   Losartan 40 mg daily ARNI/ARB   Spironolactone not started  MRA   SGLT2 inhibitor not started SGLT2-I    Furosemide IV Loop diuretic      2. Hypertensive urgency  Assessment / Plan    Inadequately controlled - likely a large component of cardiogenic pulmonary edema and acute cardiac decompensations; titrate oral diuretic regimen as tolerated     3. Chronic atrial fibrillation  Assessment / Plan    Continue metoprolol    Warfarin per pharmacy management      Clinically Significant Risk Factors Present on Admission               # Drug Induced Coagulation Defect: home medication list includes an anticoagulant medication      Cardiovascular: Cardiac Arrhythmia: Atrial fibrillation: Chronic    Fluid & Electrolyte Disorders: Fluid overload, unspecified, Other fluid overload and Other disorders of electrolyte and fluid balance, not elsewhere classified           History of Present Illness/Subjective    Mr. Carlos Rubio is a 89 year old male with a PMHx significant for (per Dr. Ross's note) HTN,Atrial Fibrillation,HTN.  As reported in Epic , he was at a social in his assisted living facility and became flushed and slightly dizzy.  He checked his Bp and was 220/110. He called his PCP office who recommended taking an extra  dose of his lisinopril but had no effect so he was advised to come to ED    Today, Mr. Rubio reports that presented 2/2 symptomatic hypertensive urgency, but states that he's has LE edema/volume overload for some time with associated HF symptoms. We discussed HF diet and lifestyle modifications including the 2 g Na and 2L fluid restrictions. He does not currently adhere, but will do so moving forward; Management plan as detailed above      ECG: Personally reviewed. normal sinus rhythm, occasional PVC noted, unifocal.    ECHO 12/11/15 (personnaly Reviewed):   Summary   1. Normal left ventricular size and systolic performance. The ejection   fraction is estimated to be 65%.   2. Left ventricular wall thickness is mildly increased.   3. There is moderate aortic valve sclerosis without significant valvular   stenosis.   4. There is mild bi-atrial enlargement.      When compared to the prior real-time echocardiogram dated 16 May 2014,   there has been little appreciable interval change.        Physical Examination Review of Systems   BP (!) 155/73   Pulse 70   Temp 97.6  F (36.4  C) (Oral)   Resp (!) 46   Wt 81.6 kg (179 lb 14.3 oz)   SpO2 95%   BMI 28.60 kg/m    Body mass index is 28.6 kg/m .  Wt Readings from Last 3 Encounters:   10/18/22 81.6 kg (179 lb 14.3 oz)   04/08/22 81.6 kg (180 lb)   01/26/21 85.7 kg (189 lb)     General Appearance:   no distress, normal body habitus   ENT/Mouth: membranes moist, no oral lesions or bleeding gums.      EYES:  no scleral icterus, normal conjunctivae   Neck: no carotid bruits or thyromegaly   Chest/Lungs:   lungs are clear to auscultation, no rales or wheezing, equal chest wall expansion    Cardiovascular:   Irregular. Normal first and second heart sounds with no murmurs, rubs, or gallops; the carotid, radial and posterior tibial pulses are intact, + JVD and LE edema bilaterally    Abdomen:  no organomegaly, masses, bruits, or tenderness; bowel sounds are present    Extremities: no cyanosis or clubbing   Skin: no xanthelasma, warm.    Neurologic: alert and oriented x3, calm      Psychiatric: alert and oriented x3, calm     A complete 10 systems ROS was reviewed  And is negative except what is listed in the HPI.          Medical History  Surgical History Family History Social History   Past Medical History:   Diagnosis Date     A-fib (H)     on coumadin     ARF (acute renal failure) (H)     States both his kidneys quit. Was going to start dialysis when they started working again.     Arthritis      Basal cell carcinoma      Carpal tunnel syndrome      Diabetes (H)     MUSA SAID HE DOES NOT HAVE DIABETES 12/11/15.     Heart failure (H)      HTN (hypertension)      Psoriasis      Psoriatic arthritis (H)      Sicca syndrome (H)     MUSA SAID HE DOES NOT HAVE/NEVER HAD SICCA SYNDROME 12/11/15.    Past Surgical History:   Procedure Laterality Date     ARTHROSCOPY KNEE       IR MISCELLANEOUS PROCEDURE  10/27/2008     IR MISCELLANEOUS PROCEDURE  10/27/2008     RELEASE CARPAL TUNNEL Left     no family history of premature coronary artery disease Social History     Socioeconomic History     Marital status:      Spouse name: Not on file     Number of children: Not on file     Years of education: Not on file     Highest education level: Not on file   Occupational History     Not on file   Tobacco Use     Smoking status: Former     Packs/day: 0.50     Years: 20.00     Pack years: 10.00     Types: Cigarettes     Quit date: 1975     Years since quittin.8     Smokeless tobacco: Never     Tobacco comments:     Smoked a pipe from  to about  (or maybe  - he can't remember)   Substance and Sexual Activity     Alcohol use: Yes     Alcohol/week: 1.0 standard drink     Comment: Alcoholic Drinks/day: Occasionally     Drug use: No     Sexual activity: Not on file   Other Topics Concern     Parent/sibling w/ CABG, MI or angioplasty before 65F 55M? Not Asked    Social History Narrative     Not on file     Social Determinants of Health     Financial Resource Strain: Not on file   Food Insecurity: Not on file   Transportation Needs: Not on file   Physical Activity: Not on file   Stress: Not on file   Social Connections: Not on file   Intimate Partner Violence: Not on file   Housing Stability: Not on file           Lab Results    Chemistry/lipid CBC Cardiac Enzymes/BNP/TSH/INR   Lab Results   Component Value Date    CHOL 147 10/06/2022    HDL 47 10/06/2022    TRIG 120 10/06/2022    BUN 13.4 10/18/2022     10/18/2022    CO2 29 10/18/2022    Lab Results   Component Value Date    WBC 11.1 (H) 10/18/2022    HGB 15.1 10/18/2022    HCT 46.1 10/18/2022    MCV 99 10/18/2022     10/18/2022    Lab Results   Component Value Date    TSH 2.23 10/18/2022    INR 4.49 (H) 10/18/2022     No results found for: CKTOTAL, CKMB, TROPONINI       Weight:    Wt Readings from Last 3 Encounters:   10/18/22 81.6 kg (179 lb 14.3 oz)   04/08/22 81.6 kg (180 lb)   01/26/21 85.7 kg (189 lb)       Allergies  Allergies   Allergen Reactions     Bactrim [Sulfamethoxazole W/Trimethoprim]          Surgical History  Past Surgical History:   Procedure Laterality Date     ARTHROSCOPY KNEE       IR MISCELLANEOUS PROCEDURE  10/27/2008     IR MISCELLANEOUS PROCEDURE  10/27/2008     RELEASE CARPAL TUNNEL Left        Social History  Tobacco:   History   Smoking Status     Former     Packs/day: 0.50     Years: 20.00     Quit date: 1/1/1975   Smokeless Tobacco     Never     Comment: Smoked a pipe from 1951 to about 1975 (or maybe 1973 - he can't remember)    Alcohol:   Social History    Substance and Sexual Activity      Alcohol use: Yes        Alcohol/week: 1.0 standard drink        Comment: Alcoholic Drinks/day: Occasionally   Illicit Drugs:   History   Drug Use No       Family History  Family History   Problem Relation Age of Onset     Cerebrovascular Disease Mother 76.00     Cerebrovascular Disease  Father 81.00     Pacemaker Brother 82.00          Zurdo Nelson MD on 10/19/2022      cc: Kyree Bojorquez,

## 2022-10-19 NOTE — ED NOTES
ER MD aware pt urinating x 4 up to bathroom in last 1/2 hour. Noted pt gets increased SOB and blood pressure elevates more. Will move pt to rm #9 so pt can use urinal in bed. Last void (5th time urinating) was only 125 ml--clear.

## 2022-10-19 NOTE — PHARMACY-ANTICOAGULATION SERVICE
Clinical Pharmacy - Warfarin Dosing Consult     Pharmacy has been consulted to manage this patient s warfarin therapy.  Indication: Atrial Fibrillation  Therapy Goal: INR 2-3  Provider/Team: [facility]  Warfarin Prior to Admission: Yes  Warfarin PTA Regimen: 7mg MWF, 10 mg ROW  Significant drug interactions: primidone  Recent documented change in oral intake/nutrition: Unknown    INR   Date Value Ref Range Status   10/18/2022 4.49 (H) 0.85 - 1.15 Final       Recommend NO warfarin for 10/18. INR elevated.  Pharmacy will monitor Carlos Rubio daily and order warfarin doses to achieve specified goal.      Please contact pharmacy as soon as possible if the warfarin needs to be held for a procedure or if the warfarin goals change.

## 2022-10-19 NOTE — PROGRESS NOTES
Hospitalist Progress Note    Assessment/Plan  Carlos Rubio is a 89 year old male admitted on 10/18/2022. He comes in with leg swelling and elevated BP.    Hypertensive Urgency versus emergency, POA  -Presented with evidence of heart failure.  Unclear if this is sequelae of uncontrolled BP or elevated BP cardiogenic.    -BP reasonably controlled at this time, however not at goal.    -Continue lisinopril/Cardura/metoprolol.  Adjust dose to optimize control.    -As needed IV hydralazine   -Low-sodium diet   -Check TSH/UA   -Follow echocardiogram   -Close monitor on telemetry.      HFpEF  -Hypervolemic at this time.    -?  Decompensation.  Breathing at room air with good O2 sats  -Adequately diuresing.  We will continue IV Lasix regimen.  -Strict monitor input/output.  -Daily weights/fluid restriction  -Cardiology notes reviewed, appreciate input.  -Echo of 2015 noted with EF of 65%.  Awaiting repeat echo.  -Close monitor on telemetry.     Atrial Fibrillation   Supratherapeutic INR  -Rate is controlled  -Metoprolol started by cardiology, will discontinue Propranolol  -Continue on coumadin, pharmacy to dose relative INR.  Currently held 2/2 supratherapeutic INR  -Goal INR 2-3: INR currently noted at 3.91    Hyperlipidemia   -Continue statin therapy.       6BPH  -continue flomax and proscar       Disposition Plan   Expected discharge in 3 days to transitional care unit once medically stable.      Subjective  Patient denies any new complaints.  No acute events overnight.  Endorses improvement in breathing.  Son at bedside, all questions answered.    Objective    Vital signs in last 24 hours  Temp:  [97.6  F (36.4  C)] 97.6  F (36.4  C)  Pulse:  [63-91] 78  Resp:  [16-46] 20  BP: (154-240)/() 157/70  SpO2:  [92 %-96 %] 92 % @LASTSAO2(12)@ O2 Device: None (Room air)    Weight:   Wt Readings from Last 3 Encounters:   10/18/22 81.6 kg (179 lb 14.3 oz)   04/08/22 81.6 kg (180 lb)   01/26/21 85.7 kg (189 lb)      Weight  change:     Intake/Output last 3 shifts  I/O last 3 completed shifts:  In: -   Out: 1050 [Urine:1050]  Body mass index is 28.6 kg/m .    Physical Exam    General Appearance:    Alert, cooperative, no distress, appears stated age   Lungs:     Clear bilaterally    Cardiovascular:    Regular rate ands rhythm.  Normal S1, S2.  No murmur, rub or gallop.  No edema   Abdomen:     Soft, non-tender, bowel sounds active all four quadrants,     no masses, no organomegaly   Neurologic:   Awake, alert, oriented x 3.  Grossly nonfocal      Pertinent Labs   Lab Results: personally reviewed.   Recent Labs   Lab 10/18/22  1901      CO2 29   BUN 13.4   ALBUMIN 3.9   ALKPHOS 104   ALT 13   AST 24     Recent Labs   Lab 10/18/22  1901   WBC 11.1*   HGB 15.1   HCT 46.1        No results for input(s): CKTOTAL, TROPONINI in the last 168 hours.    Invalid input(s): TROPONINT, CKMBINDEX  Invalid input(s): POCGLUFGR    Medications  Current Facility-Administered Medications   Medication     acetaminophen (TYLENOL) tablet 650 mg    Or     acetaminophen (TYLENOL) Suppository 650 mg     Continuing ACE inhibitor/ARB/ARNI from home medication list OR ACE inhibitor/ARB/ARNI order already placed during this visit     Continuing beta blocker from home medication list OR beta blocker order already placed during this visit     doxazosin (CARDURA) tablet 2 mg     finasteride (PROSCAR) tablet 5 mg     furosemide (LASIX) injection 40 mg     hydrALAZINE (APRESOLINE) injection 10 mg     lidocaine (LMX4) cream     lidocaine 1 % 0.1-1 mL     lisinopril (ZESTRIL) tablet 40 mg     melatonin tablet 1 mg     metoprolol tartrate (LOPRESSOR) half-tab 12.5 mg     nitroGLYcerin (NITRODUR) 0.4 MG/HR 24 hr patch 1 patch     nitroGLYcerin (NITRODUR) Patch in Place     ondansetron (ZOFRAN ODT) ODT tab 4 mg    Or     ondansetron (ZOFRAN) injection 4 mg     Patient is already receiving anticoagulation with heparin, enoxaparin (LOVENOX), warfarin (COUMADIN)  or  other anticoagulant medication     primidone (MYSOLINE) tablet 50 mg     propranolol (INDERAL) tablet 20 mg     simvastatin (ZOCOR) tablet 20 mg     sodium chloride (PF) 0.9% PF flush 3 mL     sodium chloride (PF) 0.9% PF flush 3 mL     tamsulosin (FLOMAX) capsule 0.4 mg     Warfarin Dose Required Daily - Pharmacist Managed     Current Outpatient Medications   Medication     Cholecalciferol (VITAMIN D3 PO)     doxazosin (CARDURA) 4 MG tablet     finasteride (PROSCAR) 5 MG tablet     lisinopril (ZESTRIL) 40 MG tablet     primidone (MYSOLINE) 50 MG tablet     propranolol (INDERAL) 20 MG tablet     simvastatin (ZOCOR) 40 MG tablet     spironolactone (ALDACTONE) 25 MG tablet     tamsulosin (FLOMAX) 0.4 MG capsule     torsemide (DEMADEX) 20 MG tablet     warfarin (COUMADIN) 5 MG tablet     metoprolol tartrate (LOPRESSOR) 25 MG tablet       Pertinent Radiology   Radiology Results: Personally reviewed   Results for orders placed or performed during the hospital encounter of 10/18/22   XR Chest Port 1 View    Impression    IMPRESSION: Interval development of patchy opacities in the lungs which could represent an infectious or an inflammatory process. No adenopathy or effusion. Mild cardiac enlargement. Normal pulmonary vascularity. Mild elevation of the right   hemidiaphragm. Benign bone island left seventh rib anteriorly. Healed fracture deformities of multiple left ribs. Degenerative changes both shoulders and the spine. Mildly atherosclerotic thoracic aorta. Monitoring leads have been placed. The patient is   slightly rotated.         Latasha Strong DO  Internal Medicine Hospitalist  10/19/2022

## 2022-10-19 NOTE — ED NOTES
Answered patient call light; he complains of a sore bottom from sitting up in the bed. I help position him to his right side. Male visitor at bedside. Call light within reach. Care taken to not displace external condom catheter. Vital signs WNL.

## 2022-10-19 NOTE — PHARMACY-ADMISSION MEDICATION HISTORY
Pharmacy Note - Admission Medication History         ______________________________________________________________________    Prior To Admission (PTA) med list completed and updated in EMR.       PTA Med List   Medication Sig Last Dose     Cholecalciferol (VITAMIN D3 PO) Take by mouth daily 10/18/2022 at pm     doxazosin (CARDURA) 4 MG tablet Take 2 mg by mouth daily  10/17/2022 at pm     finasteride (PROSCAR) 5 MG tablet Take 5 mg by mouth daily 10/17/2022 at pm     lisinopril (ZESTRIL) 40 MG tablet TAKE 1 TABLET BY MOUTH EVERY DAY 10/18/2022 at pm     primidone (MYSOLINE) 50 MG tablet Take 50 mg by mouth 3 times daily 10/17/2022 at pm     propranolol (INDERAL) 20 MG tablet Take 20 mg by mouth 2 times daily 10/18/2022 at am     simvastatin (ZOCOR) 40 MG tablet Take by mouth At Bedtime 10/17/2022 at pm     spironolactone (ALDACTONE) 25 MG tablet Take 25 mg by mouth daily 10/17/2022 at am     tamsulosin (FLOMAX) 0.4 MG capsule Take 0.4 mg by mouth 2 times daily 10/17/2022 at am     torsemide (DEMADEX) 20 MG tablet Take 1 tablet by mouth daily 10/18/2022 at am     warfarin (COUMADIN) 5 MG tablet 1.5 tabs on MWF and 2 tablets on Su,Tu,Th,Sa 10/17/2022 at pm       Information source(s): [facility]  Method of interview communication: in-person    Summary of Changes to PTA Med List  New: primidone, vit d3, propranolol  Discontinued: metoprolol, hydrochlorothiazide  Changed: torsemide every day prn--> daily, warfarin 1.5 tab 5/7 & 1 tab on Su and Wednesday---> 1.5 tab (MWF) & 2tabs (Su, Tu, Th, Sa). tamsulosin 0.4mg every day--> 0.4mg BID.     Patient was asked about OTC/herbal products specifically.  PTA med list reflects this.    In the past week, patient estimated taking medication this percent of the time:  greater than 90%.    Allergies were reviewed, assessed, and updated with the patient.      Patient did not bring any medications to the hospital and can't retrieve from home. No  multi-dose medications are available for use during hospital stay.     The information provided in this note is only as accurate as the sources available at the time of the update(s).    Thank you.    Caro Garcia [facility]  10/18/2022 11:11 PM

## 2022-10-20 ENCOUNTER — APPOINTMENT (OUTPATIENT)
Dept: OCCUPATIONAL THERAPY | Facility: HOSPITAL | Age: 87
DRG: 304 | End: 2022-10-20
Payer: COMMERCIAL

## 2022-10-20 PROBLEM — N17.0 ACUTE KIDNEY FAILURE WITH TUBULAR NECROSIS (H): Status: ACTIVE | Noted: 2022-10-20

## 2022-10-20 LAB
ALBUMIN UR-MCNC: NEGATIVE MG/DL
ANION GAP SERPL CALCULATED.3IONS-SCNC: 6 MMOL/L (ref 7–15)
APPEARANCE UR: ABNORMAL
BACTERIA #/AREA URNS HPF: ABNORMAL /HPF
BILIRUB UR QL STRIP: NEGATIVE
BUN SERPL-MCNC: 24.5 MG/DL (ref 8–23)
CALCIUM SERPL-MCNC: 8.8 MG/DL (ref 8.2–9.6)
CHLORIDE SERPL-SCNC: 100 MMOL/L (ref 98–107)
COLOR UR AUTO: COLORLESS
CREAT SERPL-MCNC: 1.19 MG/DL (ref 0.67–1.17)
DEPRECATED HCO3 PLAS-SCNC: 31 MMOL/L (ref 22–29)
ERYTHROCYTE [DISTWIDTH] IN BLOOD BY AUTOMATED COUNT: 13 % (ref 10–15)
GFR SERPL CREATININE-BSD FRML MDRD: 57 ML/MIN/1.73M2
GLUCOSE SERPL-MCNC: 115 MG/DL (ref 70–99)
GLUCOSE UR STRIP-MCNC: NEGATIVE MG/DL
HCT VFR BLD AUTO: 41.9 % (ref 40–53)
HGB BLD-MCNC: 13.5 G/DL (ref 13.3–17.7)
HGB UR QL STRIP: ABNORMAL
HYALINE CASTS: 1 /LPF
INR PPP: 2.99 (ref 0.85–1.15)
KETONES UR STRIP-MCNC: NEGATIVE MG/DL
LEUKOCYTE ESTERASE UR QL STRIP: ABNORMAL
MCH RBC QN AUTO: 31.8 PG (ref 26.5–33)
MCHC RBC AUTO-ENTMCNC: 32.2 G/DL (ref 31.5–36.5)
MCV RBC AUTO: 99 FL (ref 78–100)
MUCOUS THREADS #/AREA URNS LPF: PRESENT /LPF
NITRATE UR QL: NEGATIVE
NT-PROBNP SERPL-MCNC: 4308 PG/ML (ref 0–1800)
PH UR STRIP: 5 [PH] (ref 5–7)
PLATELET # BLD AUTO: 288 10E3/UL (ref 150–450)
POTASSIUM SERPL-SCNC: 3.8 MMOL/L (ref 3.4–5.3)
RBC # BLD AUTO: 4.24 10E6/UL (ref 4.4–5.9)
RBC URINE: 94 /HPF
SODIUM SERPL-SCNC: 137 MMOL/L (ref 136–145)
SP GR UR STRIP: 1.01 (ref 1–1.03)
SQUAMOUS EPITHELIAL: 1 /HPF
UROBILINOGEN UR STRIP-MCNC: <2 MG/DL
WBC # BLD AUTO: 12.5 10E3/UL (ref 4–11)
WBC URINE: 53 /HPF

## 2022-10-20 PROCEDURE — 99232 SBSQ HOSP IP/OBS MODERATE 35: CPT | Performed by: INTERNAL MEDICINE

## 2022-10-20 PROCEDURE — 87086 URINE CULTURE/COLONY COUNT: CPT | Performed by: INTERNAL MEDICINE

## 2022-10-20 PROCEDURE — 36415 COLL VENOUS BLD VENIPUNCTURE: CPT | Performed by: HOSPITALIST

## 2022-10-20 PROCEDURE — 250N000013 HC RX MED GY IP 250 OP 250 PS 637: Performed by: INTERNAL MEDICINE

## 2022-10-20 PROCEDURE — 82310 ASSAY OF CALCIUM: CPT | Performed by: HOSPITALIST

## 2022-10-20 PROCEDURE — P9047 ALBUMIN (HUMAN), 25%, 50ML: HCPCS | Performed by: INTERNAL MEDICINE

## 2022-10-20 PROCEDURE — 258N000003 HC RX IP 258 OP 636: Performed by: INTERNAL MEDICINE

## 2022-10-20 PROCEDURE — 85610 PROTHROMBIN TIME: CPT | Performed by: HOSPITALIST

## 2022-10-20 PROCEDURE — 99233 SBSQ HOSP IP/OBS HIGH 50: CPT | Performed by: INTERNAL MEDICINE

## 2022-10-20 PROCEDURE — 97535 SELF CARE MNGMENT TRAINING: CPT | Mod: GO

## 2022-10-20 PROCEDURE — 81001 URINALYSIS AUTO W/SCOPE: CPT | Performed by: INTERNAL MEDICINE

## 2022-10-20 PROCEDURE — 250N000011 HC RX IP 250 OP 636: Performed by: INTERNAL MEDICINE

## 2022-10-20 PROCEDURE — 83880 ASSAY OF NATRIURETIC PEPTIDE: CPT | Performed by: HOSPITALIST

## 2022-10-20 PROCEDURE — 85027 COMPLETE CBC AUTOMATED: CPT | Performed by: HOSPITALIST

## 2022-10-20 PROCEDURE — 210N000001 HC R&B IMCU HEART CARE

## 2022-10-20 PROCEDURE — 250N000013 HC RX MED GY IP 250 OP 250 PS 637: Performed by: HOSPITALIST

## 2022-10-20 PROCEDURE — 82374 ASSAY BLOOD CARBON DIOXIDE: CPT | Performed by: HOSPITALIST

## 2022-10-20 RX ORDER — HYDRALAZINE HYDROCHLORIDE 20 MG/ML
15 INJECTION INTRAMUSCULAR; INTRAVENOUS ONCE
Status: COMPLETED | OUTPATIENT
Start: 2022-10-20 | End: 2022-10-20

## 2022-10-20 RX ORDER — WARFARIN SODIUM 5 MG/1
5 TABLET ORAL
Status: COMPLETED | OUTPATIENT
Start: 2022-10-20 | End: 2022-10-20

## 2022-10-20 RX ORDER — IBUPROFEN 400 MG/1
400 TABLET, FILM COATED ORAL ONCE
Status: COMPLETED | OUTPATIENT
Start: 2022-10-20 | End: 2022-10-20

## 2022-10-20 RX ORDER — ALBUMIN (HUMAN) 12.5 G/50ML
50 SOLUTION INTRAVENOUS ONCE
Status: COMPLETED | OUTPATIENT
Start: 2022-10-20 | End: 2022-10-20

## 2022-10-20 RX ORDER — FUROSEMIDE 10 MG/ML
80 INJECTION INTRAMUSCULAR; INTRAVENOUS ONCE
Status: COMPLETED | OUTPATIENT
Start: 2022-10-20 | End: 2022-10-20

## 2022-10-20 RX ADMIN — PRIMIDONE 50 MG: 50 TABLET ORAL at 20:48

## 2022-10-20 RX ADMIN — DOXAZOSIN 2 MG: 1 TABLET ORAL at 08:03

## 2022-10-20 RX ADMIN — PRIMIDONE 50 MG: 50 TABLET ORAL at 08:03

## 2022-10-20 RX ADMIN — HYDRALAZINE HYDROCHLORIDE 75 MG: 50 TABLET, FILM COATED ORAL at 13:14

## 2022-10-20 RX ADMIN — METOPROLOL TARTRATE 12.5 MG: 25 TABLET, FILM COATED ORAL at 20:48

## 2022-10-20 RX ADMIN — FINASTERIDE 5 MG: 5 TABLET, FILM COATED ORAL at 08:03

## 2022-10-20 RX ADMIN — ACETAMINOPHEN 650 MG: 325 TABLET, FILM COATED ORAL at 00:00

## 2022-10-20 RX ADMIN — FUROSEMIDE 15 MG/HR: 10 INJECTION, SOLUTION INTRAVENOUS at 10:16

## 2022-10-20 RX ADMIN — SIMVASTATIN 20 MG: 10 TABLET, FILM COATED ORAL at 20:49

## 2022-10-20 RX ADMIN — FUROSEMIDE 15 MG/HR: 10 INJECTION, SOLUTION INTRAVENOUS at 17:24

## 2022-10-20 RX ADMIN — HYDRALAZINE HYDROCHLORIDE 75 MG: 50 TABLET, FILM COATED ORAL at 20:49

## 2022-10-20 RX ADMIN — TAMSULOSIN HYDROCHLORIDE 0.4 MG: 0.4 CAPSULE ORAL at 08:03

## 2022-10-20 RX ADMIN — IBUPROFEN 400 MG: 400 TABLET, FILM COATED ORAL at 13:13

## 2022-10-20 RX ADMIN — TAMSULOSIN HYDROCHLORIDE 0.4 MG: 0.4 CAPSULE ORAL at 20:49

## 2022-10-20 RX ADMIN — ALBUMIN HUMAN 50 G: 0.25 SOLUTION INTRAVENOUS at 07:57

## 2022-10-20 RX ADMIN — HYDRALAZINE HYDROCHLORIDE 75 MG: 50 TABLET, FILM COATED ORAL at 17:17

## 2022-10-20 RX ADMIN — METOPROLOL TARTRATE 12.5 MG: 25 TABLET, FILM COATED ORAL at 08:02

## 2022-10-20 RX ADMIN — ACETAMINOPHEN 650 MG: 325 TABLET, FILM COATED ORAL at 08:19

## 2022-10-20 RX ADMIN — WARFARIN SODIUM 5 MG: 5 TABLET ORAL at 17:17

## 2022-10-20 RX ADMIN — FUROSEMIDE 80 MG: 10 INJECTION, SOLUTION INTRAMUSCULAR; INTRAVENOUS at 08:04

## 2022-10-20 RX ADMIN — PRIMIDONE 50 MG: 50 TABLET ORAL at 13:13

## 2022-10-20 RX ADMIN — HYDRALAZINE HYDROCHLORIDE 15 MG: 20 INJECTION, SOLUTION INTRAMUSCULAR; INTRAVENOUS at 08:02

## 2022-10-20 ASSESSMENT — ACTIVITIES OF DAILY LIVING (ADL)
ADLS_ACUITY_SCORE: 43
ADLS_ACUITY_SCORE: 38
ADLS_ACUITY_SCORE: 43
ADLS_ACUITY_SCORE: 43
ADLS_ACUITY_SCORE: 38
ADLS_ACUITY_SCORE: 43
ADLS_ACUITY_SCORE: 42
ADLS_ACUITY_SCORE: 43
ADLS_ACUITY_SCORE: 38
ADLS_ACUITY_SCORE: 42
ADLS_ACUITY_SCORE: 43
ADLS_ACUITY_SCORE: 43

## 2022-10-20 NOTE — PLAN OF CARE
Goal Outcome Evaluation:    Pt reported pain in penis, gave tylenol with relief.  Pt stated penis started bleeding when he was straight cath yesterday. Urine is red color and tip of penis does have small clot, but no continuous bleeding, cont to monitor  Primo-fit in place for strict I&O's. 250 output since midnight    BP elevated 177/85 and 170/78, does not meet parameters for hydralazine. Cont to monitor

## 2022-10-20 NOTE — PROGRESS NOTES
[facility] ED Handoff Report    ED Chief Complaint: leg swelling and elevated BP    ED Diagnosis:  (I16.0) Hypertensive urgency  Comment: resolved  Plan: adjust meds    (I50.23) Acute on chronic systolic congestive heart failure (H)  Comment: strict I and O  Plan: cards following IV lasix    (I48.19) Persistent atrial fibrillation (H)  Comment: rate controlled   Plan: meds per cards       PMH:    Past Medical History:   Diagnosis Date     A-fib (H)     on coumadin     ARF (acute renal failure) (H) 1979    States both his kidneys quit. Was going to start dialysis when they started working again.     Arthritis      Basal cell carcinoma      Carpal tunnel syndrome      Diabetes (H)     MUSA SAID HE DOES NOT HAVE DIABETES 12/11/15.     Heart failure (H)      HTN (hypertension)      Psoriasis      Psoriatic arthritis (H)      Sicca syndrome (H)     MUSA SAID HE DOES NOT HAVE/NEVER HAD SICCA SYNDROME 12/11/15.        Code Status:  Special Code     Falls Risk: Yes Band: Applied    Current Living Situation/Residence: lives independently    Elimination Status: Continent: primofit     Activity Level: SBA w/ walker    Patients Preferred Language:  English     Needed: No    Vital Signs:  /56 (BP Location: Left arm, Patient Position: Sitting, Cuff Size: Adult Regular)   Pulse 74   Temp 98.4  F (36.9  C) (Oral)   Resp 17   Wt 81.6 kg (179 lb 14.3 oz)   SpO2 94%   BMI 28.60 kg/m       Cardiac Rhythm: a fib    Pain Score: 0/10    Is the Patient Confused:  No    Last Food or Drink: 10/19/22 at dinner    Focused Assessment:  Retaining urine, bladder scan once per shift, last straight cath 1700.    Tests Performed: Done: Labs and Imaging    Treatments Provided:  K protocol    Family Dynamics/Concerns: No    Family Updated On Visitor Policy: Yes    Plan of Care Communicated to Family: Yes    Who Was Updated about Plan of Care: son    Belongings Checklist Done and Signed by Patient:  Yes    Medications sent with patient: na    Covid: asymptomatic , negative    Additional Information: forgetful    RN: Indu Benjamin RN  **!* 10/19/2022 7:35 PM

## 2022-10-20 NOTE — PLAN OF CARE
Goal Outcome Evaluation:      Plan of Care Reviewed With: patient           Heart Failure Care Map  GOALS TO BE MET BEFORE DISCHARGE:    1. Decrease congestion and/or edema with diuretic therapy to achieve near optimal volume status.     Dyspnea improved: Yes, satisfactory for discharge.   Edema improved: No, further care required to meet this goal. Please explain See flowsheet charting        Net I/O and Weights since admission:   09/20 1500 - 10/20 1459  In: 590 [P.O.:590]  Out: 1800 [Urine:1800]  Net: -1210     Vitals:    10/18/22 1845 10/20/22 0540   Weight: 81.6 kg (179 lb 14.3 oz) 78.8 kg (173 lb 11.2 oz)       2.  O2 sats > 90% on room air, or at prior home O2 therapy level.      Able to wean O2 this shift to keep sats above 90%?: Yes, satisfactory for discharge.   Does patient use Home O2? No          Current oxygenation status:   SpO2: 93 %     O2 Device: None (Room air),      3.  Tolerates ambulation and mobility near baseline.     Ambulation: No, further care required to meet this goal. Please explain Patient not feeling up to ambulation yet. Therapy to see this afternoon.   Times patient ambulated with staff this shift: 0    Please review the Heart Failure Care Map for additional HF goal outcomes.    Sidra Cevallos RN  10/20/2022

## 2022-10-20 NOTE — CONSULTS
"  [facility] CONSULTATION    Type of Consult: inpatient  Place of Service: [facility]   Reason for consult: Urinary retention  Request for consult by: Dr. Strong    History of present illness:   Musa Rubio is a 89 year old male that was admitted for Persistent atrial fibrillation (H). Urology is being consulted for Urinary retention. History obtained through patient, and chart review. He was admitted on 10/18/22 for leg swelling and hypertensive urgency.    Patient is previously known to [facility], used to follow with Dr. Pearson. Unfortunately I do not have access to our records older than 2 years due to technical issues. Per the patient he was seen many years ago and was evaluated for BPH and screening for prostate cancer. He has been on finasteride and tamsulosin x years which has managed his symptoms. He does endorse baseline BPH symptoms including weak urinary stream, hesitancy, intermittent stream and nocturia. He denies previous procedures on his prostate. Endorses prior history of urinary retention after a \"heart procedure\" many years ago.    He was found to have urinary retention over the last 24 hours, with PVR of 900ml. His creatinine elevated to 1.19 (0.79 prior). Patient states he is tolerating the best catheter now. Denies fevers, chills, N/V, abdominal pain, flank pain.    Past Medical History:  Past Medical History:   Diagnosis Date     A-fib (H)     on coumadin     ARF (acute renal failure) (H) 1979    States both his kidneys quit. Was going to start dialysis when they started working again.     Arthritis      Basal cell carcinoma      Carpal tunnel syndrome      Diabetes (H)     MUSA SAID HE DOES NOT HAVE DIABETES 12/11/15.     Heart failure (H)      HTN (hypertension)      Psoriasis      Psoriatic arthritis (H)      Sicca syndrome (H)     MUSA SAID HE DOES NOT HAVE/NEVER HAD SICCA SYNDROME 12/11/15.       Past Surgical History:  Past Surgical History:   Procedure " Laterality Date     ARTHROSCOPY KNEE       IR MISCELLANEOUS PROCEDURE  10/27/2008     IR MISCELLANEOUS PROCEDURE  10/27/2008     RELEASE CARPAL TUNNEL Left        Social History:  Social History     Socioeconomic History     Marital status:      Spouse name: Not on file     Number of children: Not on file     Years of education: Not on file     Highest education level: Not on file   Occupational History     Not on file   Tobacco Use     Smoking status: Former     Packs/day: 0.50     Years: 20.00     Pack years: 10.00     Types: Cigarettes     Quit date: 1975     Years since quittin.8     Smokeless tobacco: Never     Tobacco comments:     Smoked a pipe from  to about  (or maybe  - he can't remember)   Substance and Sexual Activity     Alcohol use: Yes     Alcohol/week: 1.0 standard drink     Comment: Alcoholic Drinks/day: Occasionally     Drug use: No     Sexual activity: Not on file   Other Topics Concern     Parent/sibling w/ CABG, MI or angioplasty before 65F 55M? Not Asked   Social History Narrative     Not on file     Social Determinants of Health     Financial Resource Strain: Not on file   Food Insecurity: Not on file   Transportation Needs: Not on file   Physical Activity: Not on file   Stress: Not on file   Social Connections: Not on file   Intimate Partner Violence: Not on file   Housing Stability: Not on file       Medications:  Current Facility-Administered Medications   Medication     acetaminophen (TYLENOL) tablet 650 mg    Or     acetaminophen (TYLENOL) Suppository 650 mg     Continuing ACE inhibitor/ARB/ARNI from home medication list OR ACE inhibitor/ARB/ARNI order already placed during this visit     Continuing beta blocker from home medication list OR beta blocker order already placed during this visit     doxazosin (CARDURA) tablet 2 mg     finasteride (PROSCAR) tablet 5 mg     furosemide (LASIX) 100 mg in sodium chloride 0.9 % 100 mL infusion     hydrALAZINE  (APRESOLINE) injection 10 mg     hydrALAZINE (APRESOLINE) tablet 75 mg     lidocaine (LMX4) cream     lidocaine 1 % 0.1-1 mL     [Held by provider] lisinopril (ZESTRIL) tablet 40 mg     melatonin tablet 1 mg     metoprolol tartrate (LOPRESSOR) half-tab 12.5 mg     nitroGLYcerin (NITRODUR) Patch in Place     ondansetron (ZOFRAN ODT) ODT tab 4 mg    Or     ondansetron (ZOFRAN) injection 4 mg     Patient is already receiving anticoagulation with heparin, enoxaparin (LOVENOX), warfarin (COUMADIN)  or other anticoagulant medication     primidone (MYSOLINE) tablet 50 mg     simvastatin (ZOCOR) tablet 20 mg     sodium chloride (PF) 0.9% PF flush 3 mL     sodium chloride (PF) 0.9% PF flush 3 mL     tamsulosin (FLOMAX) capsule 0.4 mg     warfarin ANTICOAGULANT (COUMADIN) tablet 5 mg     Warfarin Dose Required Daily - Pharmacist Managed       Allergies:   Allergies   Allergen Reactions     Bactrim [Sulfamethoxazole W/Trimethoprim]        Review of Systems:   A full 12 point review of systems was taken and is negative aside from what is noted above in the HPI    Physical Exam:  Temp:  [97.7  F (36.5  C)-98.1  F (36.7  C)] 98  F (36.7  C)  Pulse:  [67-93] 83  Resp:  [18-20] 18  BP: ()/(46-88) 100/46  SpO2:  [92 %-96 %] 94 %  General: NAD, alert, cooperative  Head: normocephalic, without abnormality / atraumatic  Abdomen: soft, non tender, non distended. no suprapubic fullness/tenderness. no CVA tenderness noted  Geniturinary: best catheter in place, patent, draining clear yellow urine without sediment or clot. There is minor hematuria in the bag.   Psychological: alert and oriented, answers questions appropriately      Labs:   Creatinine   Date Value Ref Range Status   10/20/2022 1.19 (H) 0.67 - 1.17 mg/dL Final       Lab Results   Component Value Date    WBC 12.5 10/20/2022     Lab Results   Component Value Date    HGB 13.5 10/20/2022     Lab Results   Component Value Date     10/20/2022       Lab Results:  personally reviewed     I have personally reviewed the imaging reports above.     Assessment / Plan : Carlos Rubio is being seen by [facility] for urinary retention.    - 89 year old male with baseline BPH with LUTS and developed urinary retention recently with PVR 900ml.  - Creatinine elevated slightly at 1.19. 0.79 is baseline. KENNEDY likely related to retention. Maintain best catheter.   - UA ordered to rule out infection.  - Recommend maintain best catheter x 1-2 weeks with TOV in our clinic. Will send email to arrange.   - Will sign off. Please don't hesitate to contact us with questions or concerns.    Thank you for consulting [facility] regarding this patient's care. Please contact us with questions or concerns.     Lawson Lozada PA-C  [facility]   [phone]

## 2022-10-20 NOTE — PROGRESS NOTES
Care Management Follow Up    Length of Stay (days): 1    Expected Discharge Date: 10/21/2022     Concerns to be Addressed:     BP not at goal.  Patient plan of care discussed at interdisciplinary rounds: Yes    Anticipated Discharge Disposition:  TBD     Anticipated Discharge Services:  TBD  Anticipated Discharge DME:  TBD    Patient/family educated on Medicare website which has current facility and service quality ratings:    Education Provided on the Discharge Plan:    Patient/Family in Agreement with the Plan:      Referrals Placed by CM/SW:    Private pay costs discussed: Not applicable    Additional Information:  Pt lives alone in a senior independent living apartment. Family is supportive. RNCM to follow for medical progression, recommendations, and final discharge plan.        Marysol Barillas RN

## 2022-10-20 NOTE — PROGRESS NOTES
NUTRITION EDUCATION      REASON FOR ASSESSMENT:  Nutrition education consult for 2g sodium diet    NUTRITION HISTORY:  Information obtained from patient    Pt reports that he has been trying to follow a low sodium diet at home, and he does most of the cooking and shopping. He reports looking at food labels and identifying high sodium foods, which he then avoids. He asked questions specific to dairy products including sodium contents of cottage cheese, yogurt and ice cream, which we then discussed.    CURRENT DIET:  Low Saturated Fat Na <2400mg Diet    NUTRITION DIAGNOSIS:  Food- and nutrition-related knowledge deficit R/t no previous low sodium nutrition education.    INTERVENTIONS:    Nutrition Prescription:  Dosing weight: 68.5 -> adjusted based on most current weight (78.8 kg) and IBW (65 kg)  5393-3145 kcal/day (25-30 kcal/kg) and 75-96 g protein/day (1.1-1.4 g/kg) -> heart failure    Implementation:      *  Nutrition Education (Content):   A)  Provided handout on low sodium nutrition education   B)  Discussed refraining from adding table salt to foods and looking at food labels to identify low sodium foods. Discussed low sodium foods by category and foods that are not recommended such as processed meats/cheeses, cottage cheese, and high sodium condiments.      *  Nutrition Education (Application):   A)  Discussed current eating habits and recommended alternative food choices      *  Anticipate good compliance      *  Diet Education - refer to Education Flowsheet    Goals:      *  Patient will verbalize understanding of diet by identifying low sodium foods by looking at food labels.      *  All of the above goals met during the education session    Follow Up/Monitoring:      *  Provided RD contact information for future questions      *  Recommended Out-Patient Nutrition Referral, if further diet instructions are needed    Carmela Ray  Dietetic Intern

## 2022-10-20 NOTE — PROGRESS NOTES
Hospitalist Progress Note    Assessment/Plan  Carlos Rubio is a 89 year old male admitted on 10/18/2022. He comes in with leg swelling and elevated BP.    Hypertensive Urgency versus emergency, POA  -Presented with evidence of heart failure.  Unclear if this is sequelae of uncontrolled BP or elevated BP cardiogenic.    -BP reasonably controlled at this time, however not at goal.    -Continue lisinopril/Cardura/metoprolol.  Adjust dose to optimize control.    -As needed IV hydralazine   -Low-sodium diet   -Check TSH/UA   -Follow echocardiogram   -Close monitor on telemetry.   10/20  -TSH WNL  -Echocardiogram with EF of 55 to 60%, moderate aortic stenosis, and moderate pulmonary hypertension.  -BP improving with current therapy.  Will hold lisinopril in the setting of poor renal function.  -Further management as stated above    Acute kidney injury.  Etiology not quite clear.  Pre-/post renal.  -Patient has had urinary retention over the course of the last 24 hours.  Last bladder scan with 900 cc.  Patient also on Lasix therapy.  (Cardiology notes reviewed, appreciate input.  Transition to Lasix drip)  -Creatinine bumped up to 1.19 from 0.79.  -We will place Sanders catheter.  -Avoid hypotension/nephrotoxins  -I will consult nephrology if continued worsening.  -Renally dose medications.  -Monitor closely.     HFpEF  -Hypervolemic at this time.    -?  Decompensation.  Breathing at room air with good O2 sats  -Adequately diuresing.  We will continue IV Lasix regimen.  -Strict monitor input/output.  -Daily weights/fluid restriction  -Cardiology notes reviewed, appreciate input.  -Echo of 2015 noted with EF of 65%.  Awaiting repeat echo.  -Close monitor on telemetry.  10/20  -Adequate BP control.  Continued diuresis.  -Defer management to cardiology.  -Hold lisinopril.     Atrial Fibrillation   Supratherapeutic INR  -Rate is controlled  -Metoprolol started by cardiology, will discontinue Propranolol  -Continue on coumadin,  pharmacy to dose relative INR.  Currently held 2/2 supratherapeutic INR  -Goal INR 2-3: INR currently noted at 3.91  10/20  -INR 2.99  -Pharmacy management of warfarin therapy    Hyperlipidemia   -Continue statin therapy.       BPH  -continue flomax and proscar  10/20  -Patient confided in me that he has been having long-term frequency from prostate issues.  Denies dysuria.  -Urinary retention today (about 900 cc from bladder scan)  -UA with reflex to microscopy and culture.  -Orders to place Sanders catheter.  -Urology consult.       Disposition Plan   Expected discharge in 3 days to transitional care unit once medically stable.      Subjective  Worried about frequency.  Desires definitive therapy for prostrate enlargement.  Bladder scan showing 900 cc urine.  Otherwise no new issues.  No acute events overnight.    Objective    Vital signs in last 24 hours  Temp:  [97.8  F (36.6  C)-98.4  F (36.9  C)] 97.9  F (36.6  C)  Pulse:  [67-82] 76  Resp:  [17-41] 18  BP: (106-178)/(55-88) 178/88  SpO2:  [92 %-96 %] 93 % @LASTSAO2(12)@ O2 Device: None (Room air)    Weight:   Wt Readings from Last 3 Encounters:   10/20/22 78.8 kg (173 lb 11.2 oz)   04/08/22 81.6 kg (180 lb)   01/26/21 85.7 kg (189 lb)      Weight change: -2.81 kg (-6 lb 3.1 oz)    Intake/Output last 3 shifts  I/O last 3 completed shifts:  In: 350 [P.O.:350]  Out: 750 [Urine:750]  Body mass index is 27.62 kg/m .    Physical Exam    General Appearance:    Alert, cooperative, no distress, appears stated age   Lungs:     Clear bilaterally    Cardiovascular:    Regular rate ands rhythm.  Normal S1, S2.  No murmur, rub or gallop.  No edema   Abdomen:     Soft, non-tender, bowel sounds active all four quadrants,     no masses, no organomegaly   Neurologic:   Awake, alert, oriented x 3.  Grossly nonfocal      Pertinent Labs   Lab Results: personally reviewed.   Recent Labs   Lab 10/20/22  0448 10/18/22  2048 10/18/22  1901    134* 136   CO2 31* 27 29   BUN 24.5*  13.7 13.4   ALBUMIN  --   --  3.9   ALKPHOS  --   --  104   ALT  --   --  13   AST  --   --  24     Recent Labs   Lab 10/20/22  0448 10/19/22  0938 10/18/22  1901   WBC 12.5* 14.0* 11.1*   HGB 13.5 14.1 15.1   HCT 41.9 42.8 46.1    288 295     No results for input(s): CKTOTAL, TROPONINI in the last 168 hours.    Invalid input(s): TROPONINT, CKMBINDEX  Invalid input(s): POCGLUFGR    Medications  Current Facility-Administered Medications   Medication     acetaminophen (TYLENOL) tablet 650 mg    Or     acetaminophen (TYLENOL) Suppository 650 mg     Continuing ACE inhibitor/ARB/ARNI from home medication list OR ACE inhibitor/ARB/ARNI order already placed during this visit     Continuing beta blocker from home medication list OR beta blocker order already placed during this visit     doxazosin (CARDURA) tablet 2 mg     finasteride (PROSCAR) tablet 5 mg     furosemide (LASIX) 100 mg in sodium chloride 0.9 % 100 mL infusion     hydrALAZINE (APRESOLINE) injection 10 mg     hydrALAZINE (APRESOLINE) tablet 75 mg     lidocaine (LMX4) cream     lidocaine 1 % 0.1-1 mL     [Held by provider] lisinopril (ZESTRIL) tablet 40 mg     melatonin tablet 1 mg     metoprolol tartrate (LOPRESSOR) half-tab 12.5 mg     nitroGLYcerin (NITRODUR) Patch in Place     ondansetron (ZOFRAN ODT) ODT tab 4 mg    Or     ondansetron (ZOFRAN) injection 4 mg     Patient is already receiving anticoagulation with heparin, enoxaparin (LOVENOX), warfarin (COUMADIN)  or other anticoagulant medication     primidone (MYSOLINE) tablet 50 mg     simvastatin (ZOCOR) tablet 20 mg     sodium chloride (PF) 0.9% PF flush 3 mL     sodium chloride (PF) 0.9% PF flush 3 mL     tamsulosin (FLOMAX) capsule 0.4 mg     Warfarin Dose Required Daily - Pharmacist Managed       Pertinent Radiology   Radiology Results: Personally reviewed   Results for orders placed or performed during the hospital encounter of 10/18/22   XR Chest Port 1 View    Impression    IMPRESSION:  Interval development of patchy opacities in the lungs which could represent an infectious or an inflammatory process. No adenopathy or effusion. Mild cardiac enlargement. Normal pulmonary vascularity. Mild elevation of the right   hemidiaphragm. Benign bone island left seventh rib anteriorly. Healed fracture deformities of multiple left ribs. Degenerative changes both shoulders and the spine. Mildly atherosclerotic thoracic aorta. Monitoring leads have been placed. The patient is   slightly rotated.         aLtasha Strong DO  Internal Medicine Hospitalist  10/20/2022

## 2022-10-20 NOTE — PROGRESS NOTES
HEART CARE NOTE          Assessment/Recommendations     1. HFpEF c/b ADHF  Assessment / Plan    Remains volume overloaded with inadequate diuresis on current regimen; KENNEDY noted on am labs - will give IV albunin x1, and transition to furosemide gtt after bolus     KENNEDY noted in the setting of urinary retention - best ordered; hold ACE-I    GDMT as detailed below; mainstay of treatment for HFpEF includes diuretics and adequate BP control (class I) and SGLT2-I (class 2a); additional medical therapy (ARNI, MRA, ARB) demonstrated less robust evidence for indication but may be considered per guideline recommendations (2b); no indication for BBlockers     Repeat echo pending     Current Pharmacotherapy AHA Guideline-Directed Medical Therapy   Lisinopril 40 mg daily - held ARNI/ARB   Spironolactone not started  MRA   SGLT2 inhibitor not started SGLT2-I    Furosemide gtt Loop diuretic       2. Hypertensive urgency  Assessment / Plan    Inadequately controlled - likely a large component of cardiogenic pulmonary edema and acute cardiac decompensations; titrate oral diuretic regimen as tolerated      3. Chronic atrial fibrillation  Assessment / Plan    Continue metoprolol    Warfarin per pharmacy management       History of Present Illness/Subjective      Mr. Carlos Rubio is a 89 year old male with a PMHx significant for (per Dr. Ross's note) HTN,Atrial Fibrillation,HTN.  As reported in Epic , he was at a social in his assisted living facility and became flushed and slightly dizzy.  He checked his Bp and was 220/110. He called his PCP office who recommended taking an extra dose of his lisinopril but had no effect so he was advised to come to ED     Today, Mr. Rubio denies any acute cardiac events or complaints; Management plan as detailed above        ECG: Personally reviewed. normal sinus rhythm, occasional PVC noted, unifocal.     ECHO 12/11/15 (personnaly Reviewed):   Summary   1. Normal left ventricular size and  systolic performance. The ejection   fraction is estimated to be 65%.   2. Left ventricular wall thickness is mildly increased.   3. There is moderate aortic valve sclerosis without significant valvular   stenosis.   4. There is mild bi-atrial enlargement.      When compared to the prior real-time echocardiogram dated 16 May 2014,   there has been little appreciable interval change.          Physical Examination Review of Systems   BP (!) 170/78 (BP Location: Left arm, Patient Position: Semi-Ta's)   Pulse 73   Temp 98.1  F (36.7  C) (Oral)   Resp 18   Wt 78.8 kg (173 lb 11.2 oz)   SpO2 94%   BMI 27.62 kg/m    Body mass index is 27.62 kg/m .  Wt Readings from Last 3 Encounters:   10/20/22 78.8 kg (173 lb 11.2 oz)   04/08/22 81.6 kg (180 lb)   01/26/21 85.7 kg (189 lb)     General Appearance:   no distress, normal body habitus   ENT/Mouth: membranes moist, no oral lesions or bleeding gums.      EYES:  no scleral icterus, normal conjunctivae   Neck: no carotid bruits or thyromegaly   Chest/Lungs:   lungs are clear to auscultation, no rales or wheezing, equal chest wall expansion    Cardiovascular:   Irregular. Normal first and second heart sounds with no murmurs, rubs, or gallops; the carotid, radial and posterior tibial pulses are intact, + JVD and LE edema bilaterally    Abdomen:  no organomegaly, masses, bruits, or tenderness; bowel sounds are present   Extremities: no cyanosis or clubbing   Skin: no xanthelasma, warm.    Neurologic: alert and oriented x3, calm     Psychiatric: alert and oriented x3, calm     A complete 10 systems ROS was reviewed  And is negative except what is listed in the HPI.          Medical History  Surgical History Family History Social History   Past Medical History:   Diagnosis Date     A-fib (H)     on coumadin     ARF (acute renal failure) (H) 1979    States both his kidneys quit. Was going to start dialysis when they started working again.     Arthritis      Basal cell  carcinoma      Carpal tunnel syndrome      Diabetes (H)     MUSA SAID HE DOES NOT HAVE DIABETES 12/11/15.     Heart failure (H)      HTN (hypertension)      Psoriasis      Psoriatic arthritis (H)      Sicca syndrome (H)     MUSA SAID HE DOES NOT HAVE/NEVER HAD SICCA SYNDROME 12/11/15.    Past Surgical History:   Procedure Laterality Date     ARTHROSCOPY KNEE       IR MISCELLANEOUS PROCEDURE  10/27/2008     IR MISCELLANEOUS PROCEDURE  10/27/2008     RELEASE CARPAL TUNNEL Left     no family history of premature coronary artery disease Social History     Socioeconomic History     Marital status:      Spouse name: Not on file     Number of children: Not on file     Years of education: Not on file     Highest education level: Not on file   Occupational History     Not on file   Tobacco Use     Smoking status: Former     Packs/day: 0.50     Years: 20.00     Pack years: 10.00     Types: Cigarettes     Quit date: 1975     Years since quittin.8     Smokeless tobacco: Never     Tobacco comments:     Smoked a pipe from  to about  (or maybe  - he can't remember)   Substance and Sexual Activity     Alcohol use: Yes     Alcohol/week: 1.0 standard drink     Comment: Alcoholic Drinks/day: Occasionally     Drug use: No     Sexual activity: Not on file   Other Topics Concern     Parent/sibling w/ CABG, MI or angioplasty before 65F 55M? Not Asked   Social History Narrative     Not on file     Social Determinants of Health     Financial Resource Strain: Not on file   Food Insecurity: Not on file   Transportation Needs: Not on file   Physical Activity: Not on file   Stress: Not on file   Social Connections: Not on file   Intimate Partner Violence: Not on file   Housing Stability: Not on file           Lab Results    Chemistry/lipid CBC Cardiac Enzymes/BNP/TSH/INR   Lab Results   Component Value Date    CHOL 147 10/06/2022    HDL 47 10/06/2022    TRIG 120 10/06/2022    BUN 24.5 (H) 10/20/2022      10/20/2022    CO2 31 (H) 10/20/2022    Lab Results   Component Value Date    WBC 12.5 (H) 10/20/2022    HGB 13.5 10/20/2022    HCT 41.9 10/20/2022    MCV 99 10/20/2022     10/20/2022    Lab Results   Component Value Date    TSH 2.23 10/18/2022    INR 2.99 (H) 10/20/2022     No results found for: CKTOTAL, CKMB, TROPONINI       Weight:    Wt Readings from Last 3 Encounters:   10/20/22 78.8 kg (173 lb 11.2 oz)   04/08/22 81.6 kg (180 lb)   01/26/21 85.7 kg (189 lb)       Allergies  Allergies   Allergen Reactions     Bactrim [Sulfamethoxazole W/Trimethoprim]          Surgical History  Past Surgical History:   Procedure Laterality Date     ARTHROSCOPY KNEE       IR MISCELLANEOUS PROCEDURE  10/27/2008     IR MISCELLANEOUS PROCEDURE  10/27/2008     RELEASE CARPAL TUNNEL Left        Social History  Tobacco:   History   Smoking Status     Former     Packs/day: 0.50     Years: 20.00     Quit date: 1/1/1975   Smokeless Tobacco     Never     Comment: Smoked a pipe from 1951 to about 1975 (or maybe 1973 - he can't remember)    Alcohol:   Social History    Substance and Sexual Activity      Alcohol use: Yes        Alcohol/week: 1.0 standard drink        Comment: Alcoholic Drinks/day: Occasionally   Illicit Drugs:   History   Drug Use No       Family History  Family History   Problem Relation Age of Onset     Cerebrovascular Disease Mother 76.00     Cerebrovascular Disease Father 81.00     Pacemaker Brother 82.00          Zurdo Nelson MD on 10/20/2022      cc: Kyree Bojorquez

## 2022-10-20 NOTE — PLAN OF CARE
"Uneventful evening shift.  Remains on lasix gtt at 15mg/hr.  Vitals stable. Sanders catheter in place with dark rohit urine, UA/UC sent.  No new concerns.     Ju Enamorado RN 10/20/2022 6:36 PM       Problem: Plan of Care - These are the overarching goals to be used throughout the patient stay.    Goal: Plan of Care Review  Description: The Plan of Care Review/Shift note should be completed every shift.  The Outcome Evaluation is a brief statement about your assessment that the patient is improving, declining, or no change.  This information will be displayed automatically on your shift note.  Outcome: Progressing  Goal: Patient-Specific Goal (Individualized)  Description: You can add care plan individualizations to a care plan. Examples of Individualization might be:  \"Parent requests to be called daily at 9am for status\", \"I have a hard time hearing out of my right ear\", or \"Do not touch me to wake me up as it startles me\".  Outcome: Progressing  Goal: Absence of Hospital-Acquired Illness or Injury  Outcome: Progressing  Intervention: Identify and Manage Fall Risk  Recent Flowsheet Documentation  Taken 10/20/2022 1530 by Ju Enamorado RN  Safety Promotion/Fall Prevention:   activity supervised   assistive device/personal items within reach   bed alarm on   clutter free environment maintained   fall prevention program maintained   lighting adjusted   nonskid shoes/slippers when out of bed  Intervention: Prevent Skin Injury  Recent Flowsheet Documentation  Taken 10/20/2022 1800 by Ju Enamorado RN  Body Position: position maintained  Taken 10/20/2022 1530 by Ju Enamorado RN  Body Position: position changed independently  Goal: Optimal Comfort and Wellbeing  Outcome: Progressing  Intervention: Provide Person-Centered Care  Recent Flowsheet Documentation  Taken 10/20/2022 1530 by Ju Enamorado RN  Trust Relationship/Rapport:   care explained   choices provided  Goal: Readiness for Transition of Care  Outcome: " Progressing     Problem: Risk for Delirium  Goal: Optimal Coping  Outcome: Progressing  Intervention: Optimize Psychosocial Adjustment to Delirium  Recent Flowsheet Documentation  Taken 10/20/2022 1530 by Ju Enamorado RN  Family/Support System Care: involvement promoted  Goal: Improved Behavioral Control  Outcome: Progressing  Intervention: Minimize Safety Risk  Recent Flowsheet Documentation  Taken 10/20/2022 1530 by Ju Enamorado RN  Enhanced Safety Measures: bed alarm set  Trust Relationship/Rapport:   care explained   choices provided  Goal: Improved Attention and Thought Clarity  Outcome: Progressing  Intervention: Maximize Cognitive Function  Recent Flowsheet Documentation  Taken 10/20/2022 1530 by Ju Enamorado RN  Reorientation Measures: clock in view  Goal: Improved Sleep  Outcome: Progressing

## 2022-10-20 NOTE — PROGRESS NOTES
[facility] was introduced to the patient today including our role in the home management of their heart failure.  Heart Failure Education Materials were shared today with the patient and son Bam today. Carlos lives in a Senior Living facility and does his own meal planning/preparation. Uses a lot of convenience foods.  Introduction to the expectations including daily weight tracking using the Daily Weight Log. He reports that he has a scale and is able to track this daily   Low Sodium diet of 2000mg or less daily, review of label reading, aim for fresh and avoid processed items. 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 Discussed low sodium options for breakfast that he can prepare.  Also left an application for [employer] meal plan to consider if this may be an option for lunch and dinners. He has poor dentition and may need soft foods for easier intake. He has resisted his dentist's recommendations to have a partial plate or dentures made.  Daily Fluid restriction of 2000ml  Considerations, not to exceed 60 oz per day.   Exercise importance as able explained  Monitor and report s/s of heart failure. How to report these changes.  Follow up appointments and testing expectations.  Jeny BISHOP RN BSN, CHFN, PCCN-K      [facility]   [phone] Nurse [facility]   [facility]   [address] [place] [address]

## 2022-10-21 ENCOUNTER — APPOINTMENT (OUTPATIENT)
Dept: OCCUPATIONAL THERAPY | Facility: HOSPITAL | Age: 87
DRG: 304 | End: 2022-10-21
Payer: COMMERCIAL

## 2022-10-21 LAB
ANION GAP SERPL CALCULATED.3IONS-SCNC: 11 MMOL/L (ref 7–15)
BASOPHILS # BLD AUTO: 0.1 10E3/UL (ref 0–0.2)
BASOPHILS NFR BLD AUTO: 0 %
BUN SERPL-MCNC: 30.2 MG/DL (ref 8–23)
CALCIUM SERPL-MCNC: 9 MG/DL (ref 8.2–9.6)
CHLORIDE SERPL-SCNC: 99 MMOL/L (ref 98–107)
CREAT SERPL-MCNC: 1.24 MG/DL (ref 0.67–1.17)
DEPRECATED HCO3 PLAS-SCNC: 27 MMOL/L (ref 22–29)
EOSINOPHIL # BLD AUTO: 0 10E3/UL (ref 0–0.7)
EOSINOPHIL NFR BLD AUTO: 0 %
ERYTHROCYTE [DISTWIDTH] IN BLOOD BY AUTOMATED COUNT: 13.2 % (ref 10–15)
GFR SERPL CREATININE-BSD FRML MDRD: 55 ML/MIN/1.73M2
GLUCOSE SERPL-MCNC: 120 MG/DL (ref 70–99)
HCT VFR BLD AUTO: 41.3 % (ref 40–53)
HGB BLD-MCNC: 13.5 G/DL (ref 13.3–17.7)
IMM GRANULOCYTES # BLD: 0.1 10E3/UL
IMM GRANULOCYTES NFR BLD: 1 %
INR PPP: 1.94 (ref 0.85–1.15)
LACTATE SERPL-SCNC: 0.7 MMOL/L (ref 0.7–2)
LYMPHOCYTES # BLD AUTO: 0.9 10E3/UL (ref 0.8–5.3)
LYMPHOCYTES NFR BLD AUTO: 7 %
MAGNESIUM SERPL-MCNC: 1.9 MG/DL (ref 1.7–2.3)
MCH RBC QN AUTO: 32.1 PG (ref 26.5–33)
MCHC RBC AUTO-ENTMCNC: 32.7 G/DL (ref 31.5–36.5)
MCV RBC AUTO: 98 FL (ref 78–100)
MONOCYTES # BLD AUTO: 1 10E3/UL (ref 0–1.3)
MONOCYTES NFR BLD AUTO: 7 %
NEUTROPHILS # BLD AUTO: 11.7 10E3/UL (ref 1.6–8.3)
NEUTROPHILS NFR BLD AUTO: 85 %
NRBC # BLD AUTO: 0 10E3/UL
NRBC BLD AUTO-RTO: 0 /100
PLATELET # BLD AUTO: 275 10E3/UL (ref 150–450)
POTASSIUM SERPL-SCNC: 3.1 MMOL/L (ref 3.4–5.3)
POTASSIUM SERPL-SCNC: 3.5 MMOL/L (ref 3.4–5.3)
RBC # BLD AUTO: 4.2 10E6/UL (ref 4.4–5.9)
SODIUM SERPL-SCNC: 137 MMOL/L (ref 136–145)
WBC # BLD AUTO: 13.7 10E3/UL (ref 4–11)

## 2022-10-21 PROCEDURE — 83605 ASSAY OF LACTIC ACID: CPT | Performed by: INTERNAL MEDICINE

## 2022-10-21 PROCEDURE — 83735 ASSAY OF MAGNESIUM: CPT | Performed by: INTERNAL MEDICINE

## 2022-10-21 PROCEDURE — 250N000013 HC RX MED GY IP 250 OP 250 PS 637: Performed by: INTERNAL MEDICINE

## 2022-10-21 PROCEDURE — 99233 SBSQ HOSP IP/OBS HIGH 50: CPT | Performed by: INTERNAL MEDICINE

## 2022-10-21 PROCEDURE — 210N000001 HC R&B IMCU HEART CARE

## 2022-10-21 PROCEDURE — 250N000013 HC RX MED GY IP 250 OP 250 PS 637: Performed by: HOSPITALIST

## 2022-10-21 PROCEDURE — 97535 SELF CARE MNGMENT TRAINING: CPT | Mod: GO

## 2022-10-21 PROCEDURE — 36415 COLL VENOUS BLD VENIPUNCTURE: CPT | Performed by: INTERNAL MEDICINE

## 2022-10-21 PROCEDURE — 99232 SBSQ HOSP IP/OBS MODERATE 35: CPT | Performed by: INTERNAL MEDICINE

## 2022-10-21 PROCEDURE — P9047 ALBUMIN (HUMAN), 25%, 50ML: HCPCS | Performed by: INTERNAL MEDICINE

## 2022-10-21 PROCEDURE — 85018 HEMOGLOBIN: CPT | Performed by: INTERNAL MEDICINE

## 2022-10-21 PROCEDURE — 80048 BASIC METABOLIC PNL TOTAL CA: CPT | Performed by: INTERNAL MEDICINE

## 2022-10-21 PROCEDURE — 258N000003 HC RX IP 258 OP 636: Performed by: INTERNAL MEDICINE

## 2022-10-21 PROCEDURE — 85610 PROTHROMBIN TIME: CPT | Performed by: HOSPITALIST

## 2022-10-21 PROCEDURE — 84132 ASSAY OF SERUM POTASSIUM: CPT | Performed by: INTERNAL MEDICINE

## 2022-10-21 PROCEDURE — 250N000011 HC RX IP 250 OP 636: Performed by: INTERNAL MEDICINE

## 2022-10-21 RX ORDER — HYDRALAZINE HYDROCHLORIDE 50 MG/1
100 TABLET, FILM COATED ORAL 4 TIMES DAILY
Status: DISCONTINUED | OUTPATIENT
Start: 2022-10-21 | End: 2022-10-22

## 2022-10-21 RX ORDER — POTASSIUM CHLORIDE 1500 MG/1
20 TABLET, EXTENDED RELEASE ORAL ONCE
Status: COMPLETED | OUTPATIENT
Start: 2022-10-21 | End: 2022-10-21

## 2022-10-21 RX ORDER — POTASSIUM CHLORIDE 1500 MG/1
40 TABLET, EXTENDED RELEASE ORAL ONCE
Status: COMPLETED | OUTPATIENT
Start: 2022-10-21 | End: 2022-10-21

## 2022-10-21 RX ORDER — ALBUMIN (HUMAN) 12.5 G/50ML
50 SOLUTION INTRAVENOUS ONCE
Status: COMPLETED | OUTPATIENT
Start: 2022-10-21 | End: 2022-10-21

## 2022-10-21 RX ORDER — BUMETANIDE 2 MG/1
2 TABLET ORAL DAILY
Status: DISCONTINUED | OUTPATIENT
Start: 2022-10-22 | End: 2022-10-23

## 2022-10-21 RX ADMIN — PRIMIDONE 50 MG: 50 TABLET ORAL at 20:34

## 2022-10-21 RX ADMIN — FINASTERIDE 5 MG: 5 TABLET, FILM COATED ORAL at 08:43

## 2022-10-21 RX ADMIN — SIMVASTATIN 20 MG: 10 TABLET, FILM COATED ORAL at 19:56

## 2022-10-21 RX ADMIN — WARFARIN SODIUM 7.5 MG: 5 TABLET ORAL at 17:21

## 2022-10-21 RX ADMIN — POTASSIUM CHLORIDE 20 MEQ: 1500 TABLET, EXTENDED RELEASE ORAL at 17:21

## 2022-10-21 RX ADMIN — METOPROLOL TARTRATE 12.5 MG: 25 TABLET, FILM COATED ORAL at 08:44

## 2022-10-21 RX ADMIN — HYDRALAZINE HYDROCHLORIDE 75 MG: 50 TABLET, FILM COATED ORAL at 08:43

## 2022-10-21 RX ADMIN — POTASSIUM CHLORIDE 40 MEQ: 20 TABLET, EXTENDED RELEASE ORAL at 08:44

## 2022-10-21 RX ADMIN — TAMSULOSIN HYDROCHLORIDE 0.4 MG: 0.4 CAPSULE ORAL at 08:43

## 2022-10-21 RX ADMIN — TAMSULOSIN HYDROCHLORIDE 0.4 MG: 0.4 CAPSULE ORAL at 19:55

## 2022-10-21 RX ADMIN — PRIMIDONE 50 MG: 50 TABLET ORAL at 08:45

## 2022-10-21 RX ADMIN — FUROSEMIDE 15 MG/HR: 10 INJECTION, SOLUTION INTRAVENOUS at 00:02

## 2022-10-21 RX ADMIN — DOXAZOSIN 2 MG: 1 TABLET ORAL at 08:44

## 2022-10-21 RX ADMIN — METOPROLOL TARTRATE 12.5 MG: 25 TABLET, FILM COATED ORAL at 20:34

## 2022-10-21 RX ADMIN — HYDRALAZINE HYDROCHLORIDE 100 MG: 50 TABLET, FILM COATED ORAL at 17:20

## 2022-10-21 RX ADMIN — ALBUMIN HUMAN 50 G: 0.25 SOLUTION INTRAVENOUS at 11:46

## 2022-10-21 RX ADMIN — FUROSEMIDE 15 MG/HR: 10 INJECTION, SOLUTION INTRAVENOUS at 05:48

## 2022-10-21 ASSESSMENT — ACTIVITIES OF DAILY LIVING (ADL)
ADLS_ACUITY_SCORE: 43

## 2022-10-21 NOTE — PROGRESS NOTES
Care Management Follow Up    Length of Stay (days): 2    Expected Discharge Date: 10/21/2022     Concerns to be Addressed:     Cont to titrate oral diuretics, Keep best for 1-2wks and then TOV in clinic.  Patient plan of care discussed at interdisciplinary rounds: Yes    Anticipated Discharge Disposition: Home Vs TCU     Anticipated Discharge Services:    Anticipated Discharge DME:      Patient/family educated on Medicare website which has current facility and service quality ratings:    Education Provided on the Discharge Plan:    Patient/Family in Agreement with the Plan:      Referrals Placed by CM/SW:    Private pay costs discussed: Not applicable    Additional Information:  Chart review:  Pt lives alone in a Independent senior apartment. Building does not have services. Spouse dies suddenly in June and  Has all her DMEs at home. 3 of 6 children live local and are supportive. Family to transport at discharge.    CM to await for therapy recommendations on plan at discharge.    Marysol Barillas RN

## 2022-10-21 NOTE — PROGRESS NOTES
HEART CARE NOTE          Assessment/Recommendations     1. HFpEF c/b ADHF  Assessment / Plan    Remains volume overloaded, but now diuresing well on furosemide gtt     KENNEDY noted in the setting of urinary retention- IV diuretics on hold; will plan to transition to oral diuretic regimen tomorrow    GDMT as detailed below; mainstay of treatment for HFpEF includes diuretics and adequate BP control (class I) and SGLT2-I (class 2a); additional medical therapy (ARNI, MRA, ARB) demonstrated less robust evidence for indication but may be considered per guideline recommendations (2b); no indication for BBlockers     Repeat echo pending     Current Pharmacotherapy AHA Guideline-Directed Medical Therapy   Lisinopril 40 mg daily - held ARNI/ARB   Spironolactone not started  MRA   SGLT2 inhibitor not started SGLT2-I    Furosemide gtt - on hold Loop diuretic       2. Hypertensive urgency  Assessment / Plan    Inadequately controlled - likely a large component of cardiogenic pulmonary edema and acute cardiac decompensations;continue to titrate oral diuretic regimen as tolerated      3. Chronic atrial fibrillation  Assessment / Plan    Continue metoprolol    Warfarin per pharmacy management    History of Present Illness/Subjective      Mr. Carlos Rubio is a 89 year old male with a PMHx significant for (per Dr. Ross's note) HTN,Atrial Fibrillation,HTN.  As reported in Epic , he was at a social in his assisted living facility and became flushed and slightly dizzy.  He checked his Bp and was 220/110. He called his PCP office who recommended taking an extra dose of his lisinopril but had no effect so he was advised to come to ED     Today, Mr. Rubio denies any acute cardiac events or complaints; Management plan as detailed above        ECG: Personally reviewed. normal sinus rhythm, occasional PVC noted, unifocal.     ECHO 12/11/15 (personnaly Reviewed):   Summary   1. Normal left ventricular size and systolic performance. The  ejection   fraction is estimated to be 65%.   2. Left ventricular wall thickness is mildly increased.   3. There is moderate aortic valve sclerosis without significant valvular   stenosis.   4. There is mild bi-atrial enlargement.      When compared to the prior real-time echocardiogram dated 16 May 2014,   there has been little appreciable interval change.          Physical Examination Review of Systems   BP (!) 149/65 (BP Location: Left arm, Patient Position: Semi-Ta's, Cuff Size: Adult Regular)   Pulse 99   Temp 98.2  F (36.8  C) (Oral)   Resp 26   Wt 78.2 kg (172 lb 8 oz)   SpO2 93%   BMI 27.43 kg/m    Body mass index is 27.43 kg/m .  Wt Readings from Last 3 Encounters:   10/21/22 78.2 kg (172 lb 8 oz)   04/08/22 81.6 kg (180 lb)   01/26/21 85.7 kg (189 lb)     General Appearance:   no distress, normal body habitus   ENT/Mouth: membranes moist, no oral lesions or bleeding gums.      EYES:  no scleral icterus, normal conjunctivae   Neck: no carotid bruits or thyromegaly   Chest/Lungs:   lungs are clear to auscultation, no rales or wheezing, equal chest wall expansion    Cardiovascular:   Regular. Normal first and second heart sounds with no murmurs, rubs, or gallops; the carotid, radial and posterior tibial pulses are intact, no JVD and mild LE edema bilaterally    Abdomen:  no organomegaly, masses, bruits, or tenderness; bowel sounds are present   Extremities: no cyanosis or clubbing   Skin: no xanthelasma, warm.    Neurologic: alert and oriented x3, calm     Psychiatric: alert and oriented x3, calm     A complete 10 systems ROS was reviewed  And is negative except what is listed in the HPI.          Medical History  Surgical History Family History Social History   Past Medical History:   Diagnosis Date     A-fib (H)     on coumadin     ARF (acute renal failure) (H) 1979    States both his kidneys quit. Was going to start dialysis when they started working again.     Arthritis      Basal cell carcinoma       Carpal tunnel syndrome      Diabetes (H)     MUSA SAID HE DOES NOT HAVE DIABETES 12/11/15.     Heart failure (H)      HTN (hypertension)      Psoriasis      Psoriatic arthritis (H)      Sicca syndrome (H)     MUSA SAID HE DOES NOT HAVE/NEVER HAD SICCA SYNDROME 12/11/15.    Past Surgical History:   Procedure Laterality Date     ARTHROSCOPY KNEE       IR MISCELLANEOUS PROCEDURE  10/27/2008     IR MISCELLANEOUS PROCEDURE  10/27/2008     RELEASE CARPAL TUNNEL Left     no family history of premature coronary artery disease Social History     Socioeconomic History     Marital status:      Spouse name: Not on file     Number of children: Not on file     Years of education: Not on file     Highest education level: Not on file   Occupational History     Not on file   Tobacco Use     Smoking status: Former     Packs/day: 0.50     Years: 20.00     Pack years: 10.00     Types: Cigarettes     Quit date: 1975     Years since quittin.8     Smokeless tobacco: Never     Tobacco comments:     Smoked a pipe from  to about  (or maybe  - he can't remember)   Substance and Sexual Activity     Alcohol use: Yes     Alcohol/week: 1.0 standard drink     Comment: Alcoholic Drinks/day: Occasionally     Drug use: No     Sexual activity: Not on file   Other Topics Concern     Parent/sibling w/ CABG, MI or angioplasty before 65F 55M? Not Asked   Social History Narrative     Not on file     Social Determinants of Health     Financial Resource Strain: Not on file   Food Insecurity: Not on file   Transportation Needs: Not on file   Physical Activity: Not on file   Stress: Not on file   Social Connections: Not on file   Intimate Partner Violence: Not on file   Housing Stability: Not on file           Lab Results    Chemistry/lipid CBC Cardiac Enzymes/BNP/TSH/INR   Lab Results   Component Value Date    CHOL 147 10/06/2022    HDL 47 10/06/2022    TRIG 120 10/06/2022    BUN 24.5 (H) 10/20/2022     10/20/2022     CO2 31 (H) 10/20/2022    Lab Results   Component Value Date    WBC 12.5 (H) 10/20/2022    HGB 13.5 10/20/2022    HCT 41.9 10/20/2022    MCV 99 10/20/2022     10/20/2022    Lab Results   Component Value Date    TSH 2.23 10/18/2022    INR 2.99 (H) 10/20/2022     No results found for: CKTOTAL, CKMB, TROPONINI       Weight:    Wt Readings from Last 3 Encounters:   10/21/22 78.2 kg (172 lb 8 oz)   04/08/22 81.6 kg (180 lb)   01/26/21 85.7 kg (189 lb)       Allergies  Allergies   Allergen Reactions     Bactrim [Sulfamethoxazole W/Trimethoprim]          Surgical History  Past Surgical History:   Procedure Laterality Date     ARTHROSCOPY KNEE       IR MISCELLANEOUS PROCEDURE  10/27/2008     IR MISCELLANEOUS PROCEDURE  10/27/2008     RELEASE CARPAL TUNNEL Left        Social History  Tobacco:   History   Smoking Status     Former     Packs/day: 0.50     Years: 20.00     Quit date: 1/1/1975   Smokeless Tobacco     Never     Comment: Smoked a pipe from 1951 to about 1975 (or maybe 1973 - he can't remember)    Alcohol:   Social History    Substance and Sexual Activity      Alcohol use: Yes        Alcohol/week: 1.0 standard drink        Comment: Alcoholic Drinks/day: Occasionally   Illicit Drugs:   History   Drug Use No       Family History  Family History   Problem Relation Age of Onset     Cerebrovascular Disease Mother 76.00     Cerebrovascular Disease Father 81.00     Pacemaker Brother 82.00          Zurdo Nelson MD on 10/21/2022      cc: Kyree Bojorquez

## 2022-10-21 NOTE — PLAN OF CARE
Problem: Plan of Care - These are the overarching goals to be used throughout the patient stay.    Goal: Optimal Comfort and Wellbeing  Outcome: Progressing   Goal Outcome Evaluation:       Denied pain overnight. Slept intermittently. Sepsis protocol triggered after midnight. VSS, lactic returned WNL.   Sanders patent. Urine rohit, cloudy, with sediment.

## 2022-10-21 NOTE — CONSULTS
NEPHROLOGY CONSULTATION    Carlos Rubio  [address]    [facility] [id number]  [phone] (home)   89 year old male  [id number]  PMD: Kyree Bojorquez    CC: I was asked to see Carlos Rubio by Dr. Strong to evaluate his KENNEDY.    ASSESSMENT AND RECOMMENDATIONS:  1. KENNEDY: pre-renal and obstruction. Baseline creatinine 0.8-1mg/dl   -His creatinine today is 1.24.    -Agree with urology consult-has best   -Agree holding diuretics and IV albumin   -Discussed with Dr. Nelson   -Daily renal function labs, weights    2. CHF: Per Dr. Nelson    3. Hypokalemia: replace and trend.     4. Hypertension: no changes today. Per primary and cards.    I will sign off now. Please call with questions.       James Veliz MD  [facility] Office: [phone]    HPI: Carlos Rubio is a 89 year old man who I am asked to see for management of KENNEDY. His baseline creatinine is 0.8-1. His creatinine today is 1.24. He has CHF. He is being diuresed. Diuretics now on hold. Had obstruction. Seen by urology. Now has best.  He denies fever, cp, sob, edema.     ROS: Other than above, a comprehensive ROS was negative.        PMH:  Patient Active Problem List   Diagnosis     Temporal arteritis (H)     Psoriasis     Atrial fibrillation (AFIB)     Benign essential hypertension     Bradycardia     Acute on chronic diastolic congestive heart failure (H)     Fall     Persistent atrial fibrillation (H)     Acute on chronic systolic congestive heart failure (H)     Hypertensive urgency     Acute kidney failure with tubular necrosis (H)         Current Facility-Administered Medications:      acetaminophen (TYLENOL) tablet 650 mg, 650 mg, Oral, Q6H PRN, 650 mg at 10/20/22 0819 **OR** acetaminophen (TYLENOL) Suppository 650 mg, 650 mg, Rectal, Q6H PRN, Alexandro Ross MD     [START ON 10/22/2022] bumetanide (BUMEX) tablet 2 mg, 2 mg, Oral, Daily, Zurdo Nelson MD     Continuing ACE inhibitor/ARB/ARNI from  home medication list OR ACE inhibitor/ARB/ARNI order already placed during this visit, , Does not apply, DOES NOT GO TO Vandana SHIRLEY Mopelola I, MD     Continuing beta blocker from home medication list OR beta blocker order already placed during this visit, , Does not apply, DOES NOT GO TO Vandana SHIRLEY Mopelola I, MD     doxazosin (CARDURA) tablet 2 mg, 2 mg, Oral, Daily, Alexandro Ross MD, 2 mg at 10/21/22 0844     finasteride (PROSCAR) tablet 5 mg, 5 mg, Oral, Daily, Alexandro Ross MD, 5 mg at 10/21/22 0843     hydrALAZINE (APRESOLINE) injection 10 mg, 10 mg, Intravenous, Q4H PRN, Alexandro Ross MD     hydrALAZINE (APRESOLINE) tablet 100 mg, 100 mg, Oral, 4x Daily, Zurdo Nelson MD     lidocaine (LMX4) cream, , Topical, Q1H PRN, Alexandro Ross MD     lidocaine 1 % 0.1-1 mL, 0.1-1 mL, Other, Q1H PRN, Alexandro Ross MD     [Held by provider] lisinopril (ZESTRIL) tablet 40 mg, 40 mg, Oral, Daily, Alexandro Ross MD, 40 mg at 10/19/22 0757     melatonin tablet 1 mg, 1 mg, Oral, At Bedtime PRN, Alexandro Ross MD     metoprolol tartrate (LOPRESSOR) half-tab 12.5 mg, 12.5 mg, Oral, BID, Alexandro Ross MD, 12.5 mg at 10/21/22 0844     nitroGLYcerin (NITRODUR) Patch in Place, , Transdermal, Q8H FLORENCIO, Alexandro Ross MD     ondansetron (ZOFRAN ODT) ODT tab 4 mg, 4 mg, Oral, Q6H PRN **OR** ondansetron (ZOFRAN) injection 4 mg, 4 mg, Intravenous, Q6H PRN, Alexandro Ross MD     Patient is already receiving anticoagulation with heparin, enoxaparin (LOVENOX), warfarin (COUMADIN)  or other anticoagulant medication, , Does not apply, Continuous PRN, Alexandro Ross MD     primidone (MYSOLINE) tablet 50 mg, 50 mg, Oral, TID, Alexandro Ross MD, 50 mg at 10/21/22 0845     simvastatin (ZOCOR) tablet 20 mg, 20 mg, Oral, At Bedtime, Alexandro Ross MD, 20 mg at 10/20/22 2049     sodium chloride (PF) 0.9% PF flush 3 mL, 3 mL, Intracatheter, Q8H, Alexandro Ross MD, 3 mL at 10/21/22  0847     sodium chloride (PF) 0.9% PF flush 3 mL, 3 mL, Intracatheter, q1 min prn, Alexandro Ross MD     tamsulosin (FLOMAX) capsule 0.4 mg, 0.4 mg, Oral, BID, Alexandro Ross MD, 0.4 mg at 10/21/22 0843     warfarin ANTICOAGULANT (COUMADIN) tablet 7.5 mg, 7.5 mg, Oral, ONCE at 18:00, Latasha Strong, DO     Warfarin Dose Required Daily - Pharmacist Managed, 1 each, Oral, See Admin Instructions, Alexandro Ross MD      Allergies:   Allergies   Allergen Reactions     Bactrim [Sulfamethoxazole W/Trimethoprim]        Social History:   Social History     Socioeconomic History     Marital status:      Spouse name: Not on file     Number of children: Not on file     Years of education: Not on file     Highest education level: Not on file   Occupational History     Not on file   Tobacco Use     Smoking status: Former     Packs/day: 0.50     Years: 20.00     Pack years: 10.00     Types: Cigarettes     Quit date: 1975     Years since quittin.8     Smokeless tobacco: Never     Tobacco comments:     Smoked a pipe from  to about  (or maybe  - he can't remember)   Substance and Sexual Activity     Alcohol use: Yes     Alcohol/week: 1.0 standard drink     Comment: Alcoholic Drinks/day: Occasionally     Drug use: No     Sexual activity: Not on file   Other Topics Concern     Parent/sibling w/ CABG, MI or angioplasty before 65F 55M? Not Asked   Social History Narrative     Not on file     Social Determinants of Health     Financial Resource Strain: Not on file   Food Insecurity: Not on file   Transportation Needs: Not on file   Physical Activity: Not on file   Stress: Not on file   Social Connections: Not on file   Intimate Partner Violence: Not on file   Housing Stability: Not on file         Family History:   Family History   Problem Relation Age of Onset     Cerebrovascular Disease Mother 76.00     Cerebrovascular Disease Father 81.00     Pacemaker Brother 82.00          Physical  "Exam:  Vital signs:  Temp: 97.6  F (36.4  C) Temp src: Oral BP: 122/79 Pulse: 81   Resp: 20 SpO2: 93 % O2 Device: None (Room air)     Weight: 78.2 kg (172 lb 8 oz)  Estimated body mass index is 27.43 kg/m  as calculated from the following:    Height as of 4/8/22: 1.689 m (5' 6.5\").    Weight as of this encounter: 78.2 kg (172 lb 8 oz).       GENERAL: NAD  HEENT: NCAT, pupils equal, sclerae not icteric, MMM  NECK: Supple.Trachea midline.   LYMPHATIC: No cervical lymphadenopathy  LUNGS: no respiratory distress,  HEART: trace leg edema.   ABDOMEN: Soft, NT,   PSYCH: Alert, flat affect  NEURO:  sensation to light touch intact  MUSC/SKEL: diminished muscle mass  SKIN: No rash     Potassium (mmol/L)   Date Value   10/21/2022 3.1 (L)   10/20/2022 3.8   10/18/2022 3.9   10/18/2022 4.0   04/13/2022 4.6   07/27/2021 4.3   12/22/2020 4.3     Chloride (mmol/L)   Date Value   10/21/2022 99   04/13/2022 103     Urea Nitrogen (mg/dL)   Date Value   10/21/2022 30.2 (H)   04/13/2022 16     Albumin (g/dL)   Date Value   10/18/2022 3.9   07/11/2018 3.4 (L)     TSH (uIU/mL)   Date Value   10/18/2022 2.23     Hemoglobin (g/dL)   Date Value   10/21/2022 13.5   10/20/2022 13.5   10/19/2022 14.1   .    Above labs reviewed by me       James Veliz MD  "

## 2022-10-21 NOTE — PROGRESS NOTES
Hospitalist Progress Note    Assessment/Plan  Carlos Rubio is a 89 year old male admitted on 10/18/2022. He comes in with leg swelling and elevated BP.    Hypertensive Urgency versus emergency, POA  -Presented with evidence of heart failure.  Unclear if this is sequelae of uncontrolled BP or elevated BP cardiogenic.    -BP reasonably controlled at this time, however not at goal.    -Continue lisinopril/Cardura/metoprolol.  Adjust dose to optimize control.    -As needed IV hydralazine   -Low-sodium diet   -Check TSH/UA   -Follow echocardiogram   -Close monitor on telemetry.   10/20  -TSH WNL  -Echocardiogram with EF of 55 to 60%, moderate aortic stenosis, and moderate pulmonary hypertension.  -BP improving with current therapy.  Will hold lisinopril in the setting of poor renal function.  -Further management as stated above  10/21  -Improved BP.  Continue to hold lisinopril.  Continue hydralazine, and metoprolol.    Acute kidney injury.  Etiology not quite clear.  Pre-/post renal.  -Patient has had urinary retention over the course of the last 24 hours.  Last bladder scan with 900 cc.  Patient also on Lasix therapy.  (Cardiology notes reviewed, appreciate input.  Transition to Lasix drip)  -Creatinine bumped up to 1.19 from 0.79.  -We will place Sanders catheter.  -Avoid hypotension/nephrotoxins  -I will consult nephrology if continued worsening.  -Renally dose medications.  -Monitor closely.  10/21  -Worsening creatinine today.  -We will hold Lasix drip.  -Nephrology on board.     HFpEF  -Hypervolemic at this time.    -?  Decompensation.  Breathing at room air with good O2 sats  -Adequately diuresing.  We will continue IV Lasix regimen.  -Strict monitor input/output.  -Daily weights/fluid restriction  -Cardiology notes reviewed, appreciate input.  -Echo of 2015 noted with EF of 65%.  Awaiting repeat echo.  -Close monitor on telemetry.  10/20  -Adequate BP control.  Continued diuresis.  -Defer management To  Cardiology.  -Hold lisinopril.  10/21  -Hold lisinopril/Lasix drip, in the setting of elevated creatinine.  -Clinically euvolemic (from physical exam)  -?  Transition to oral diuretics.  Defer to cardiology.     Atrial Fibrillation   Supratherapeutic INR  -Rate is controlled  -Metoprolol started by cardiology, will discontinue Propranolol  -Continue on coumadin, pharmacy to dose relative INR.  Currently held 2/2 supratherapeutic INR  -Goal INR 2-3: INR currently noted at 3.91  10/20  -INR 2.99  -Pharmacy management of warfarin therapy  10/21  -INR 1.94  -Continue warfarin therapy (pharmacy to monitor/dose related if INR): Goal INR 2-3  -Continue metoprolol.    Hyperlipidemia   -Continue statin therapy.       BPH  -continue flomax and proscar  10/20  -Patient confided in me that he has been having long-term frequency from prostate issues.  Denies dysuria.  -Urinary retention today (about 900 cc from bladder scan)  -UA with reflex to microscopy and culture.  -Orders to place Sanders catheter.  -Urology consult.  10/21  -Urology consult notes reviewed, appreciate input: To continue with Sanders catheter in situ for 1 to 2 weeks.  Outpatient follow-up with urology.       Disposition Plan   Expected discharge pending clinical progress.  Follow creatinine.      Subjective  Denies any new complaints.  New issues.  No acute events overnight.    Objective    Vital signs in last 24 hours  Temp:  [97.7  F (36.5  C)-98.2  F (36.8  C)] 97.9  F (36.6  C)  Pulse:  [] 73  Resp:  [18-26] 20  BP: ()/(46-75) 150/56  SpO2:  [93 %-94 %] 93 % @LASTSAO2(12)@ O2 Device: None (Room air)    Weight:   Wt Readings from Last 3 Encounters:   10/21/22 78.2 kg (172 lb 8 oz)   04/08/22 81.6 kg (180 lb)   01/26/21 85.7 kg (189 lb)      Weight change: -0.544 kg (-1 lb 3.2 oz)    Intake/Output last 3 shifts  I/O last 3 completed shifts:  In: 341 [P.O.:240; I.V.:101]  Out: 2700 [Urine:2700]  Body mass index is 27.43 kg/m .    Physical  Exam    General Appearance:    Alert, cooperative, no distress, appears stated age   Lungs:     Clear bilaterally    Cardiovascular:    Regular rate ands rhythm.  Normal S1, S2.  No murmur, rub or gallop.  No edema   Abdomen:     Soft, non-tender, bowel sounds active all four quadrants,     no masses, no organomegaly   Neurologic:   Awake, alert, oriented x 3.  Grossly nonfocal      Pertinent Labs   Lab Results: personally reviewed.   Recent Labs   Lab 10/21/22  0459 10/20/22  0448 10/18/22  2048 10/18/22  1901    137 134* 136   CO2 27 31* 27 29   BUN 30.2* 24.5* 13.7 13.4   ALBUMIN  --   --   --  3.9   ALKPHOS  --   --   --  104   ALT  --   --   --  13   AST  --   --   --  24     Recent Labs   Lab 10/21/22  0459 10/20/22  0448 10/19/22  0938   WBC 13.7* 12.5* 14.0*   HGB 13.5 13.5 14.1   HCT 41.3 41.9 42.8    288 288     No results for input(s): CKTOTAL, TROPONINI in the last 168 hours.    Invalid input(s): TROPONINT, CKMBINDEX  Invalid input(s): POCGLUFGR    Medications  Current Facility-Administered Medications   Medication     acetaminophen (TYLENOL) tablet 650 mg    Or     acetaminophen (TYLENOL) Suppository 650 mg     Continuing ACE inhibitor/ARB/ARNI from home medication list OR ACE inhibitor/ARB/ARNI order already placed during this visit     Continuing beta blocker from home medication list OR beta blocker order already placed during this visit     doxazosin (CARDURA) tablet 2 mg     finasteride (PROSCAR) tablet 5 mg     furosemide (LASIX) 100 mg in sodium chloride 0.9 % 100 mL infusion     hydrALAZINE (APRESOLINE) injection 10 mg     hydrALAZINE (APRESOLINE) tablet 75 mg     lidocaine (LMX4) cream     lidocaine 1 % 0.1-1 mL     [Held by provider] lisinopril (ZESTRIL) tablet 40 mg     melatonin tablet 1 mg     metoprolol tartrate (LOPRESSOR) half-tab 12.5 mg     nitroGLYcerin (NITRODUR) Patch in Place     ondansetron (ZOFRAN ODT) ODT tab 4 mg    Or     ondansetron (ZOFRAN) injection 4 mg      Patient is already receiving anticoagulation with heparin, enoxaparin (LOVENOX), warfarin (COUMADIN)  or other anticoagulant medication     primidone (MYSOLINE) tablet 50 mg     simvastatin (ZOCOR) tablet 20 mg     sodium chloride (PF) 0.9% PF flush 3 mL     sodium chloride (PF) 0.9% PF flush 3 mL     tamsulosin (FLOMAX) capsule 0.4 mg     Warfarin Dose Required Daily - Pharmacist Managed       Pertinent Radiology   Radiology Results: Personally reviewed   Results for orders placed or performed during the hospital encounter of 10/18/22   XR Chest Port 1 View    Impression    IMPRESSION: Interval development of patchy opacities in the lungs which could represent an infectious or an inflammatory process. No adenopathy or effusion. Mild cardiac enlargement. Normal pulmonary vascularity. Mild elevation of the right   hemidiaphragm. Benign bone island left seventh rib anteriorly. Healed fracture deformities of multiple left ribs. Degenerative changes both shoulders and the spine. Mildly atherosclerotic thoracic aorta. Monitoring leads have been placed. The patient is   slightly rotated.         Latasha Strong DO  Internal Medicine Hospitalist  10/21/2022

## 2022-10-22 ENCOUNTER — APPOINTMENT (OUTPATIENT)
Dept: OCCUPATIONAL THERAPY | Facility: HOSPITAL | Age: 87
DRG: 304 | End: 2022-10-22
Payer: COMMERCIAL

## 2022-10-22 LAB
ALBUMIN SERPL BCG-MCNC: 3.7 G/DL (ref 3.5–5.2)
ANION GAP SERPL CALCULATED.3IONS-SCNC: 14 MMOL/L (ref 7–15)
BACTERIA UR CULT: NO GROWTH
BASOPHILS # BLD AUTO: 0 10E3/UL (ref 0–0.2)
BASOPHILS NFR BLD AUTO: 0 %
BUN SERPL-MCNC: 42.3 MG/DL (ref 8–23)
CALCIUM SERPL-MCNC: 9.1 MG/DL (ref 8.2–9.6)
CHLORIDE SERPL-SCNC: 97 MMOL/L (ref 98–107)
CREAT SERPL-MCNC: 1.94 MG/DL (ref 0.67–1.17)
DEPRECATED HCO3 PLAS-SCNC: 27 MMOL/L (ref 22–29)
EOSINOPHIL # BLD AUTO: 0 10E3/UL (ref 0–0.7)
EOSINOPHIL NFR BLD AUTO: 0 %
ERYTHROCYTE [DISTWIDTH] IN BLOOD BY AUTOMATED COUNT: 13.4 % (ref 10–15)
GFR SERPL CREATININE-BSD FRML MDRD: 32 ML/MIN/1.73M2
GLUCOSE SERPL-MCNC: 119 MG/DL (ref 70–99)
HCT VFR BLD AUTO: 38.6 % (ref 40–53)
HGB BLD-MCNC: 12.6 G/DL (ref 13.3–17.7)
IMM GRANULOCYTES # BLD: 0.1 10E3/UL
IMM GRANULOCYTES NFR BLD: 1 %
INR PPP: 2.17 (ref 0.85–1.15)
LYMPHOCYTES # BLD AUTO: 1.1 10E3/UL (ref 0.8–5.3)
LYMPHOCYTES NFR BLD AUTO: 8 %
MCH RBC QN AUTO: 32.4 PG (ref 26.5–33)
MCHC RBC AUTO-ENTMCNC: 32.6 G/DL (ref 31.5–36.5)
MCV RBC AUTO: 99 FL (ref 78–100)
MONOCYTES # BLD AUTO: 1 10E3/UL (ref 0–1.3)
MONOCYTES NFR BLD AUTO: 8 %
NEUTROPHILS # BLD AUTO: 11.2 10E3/UL (ref 1.6–8.3)
NEUTROPHILS NFR BLD AUTO: 83 %
NRBC # BLD AUTO: 0 10E3/UL
NRBC BLD AUTO-RTO: 0 /100
PHOSPHATE SERPL-MCNC: 3.5 MG/DL (ref 2.5–4.5)
PLATELET # BLD AUTO: 282 10E3/UL (ref 150–450)
POTASSIUM SERPL-SCNC: 4 MMOL/L (ref 3.4–5.3)
RBC # BLD AUTO: 3.89 10E6/UL (ref 4.4–5.9)
SODIUM SERPL-SCNC: 138 MMOL/L (ref 136–145)
WBC # BLD AUTO: 13.4 10E3/UL (ref 4–11)

## 2022-10-22 PROCEDURE — 250N000013 HC RX MED GY IP 250 OP 250 PS 637: Performed by: INTERNAL MEDICINE

## 2022-10-22 PROCEDURE — 36415 COLL VENOUS BLD VENIPUNCTURE: CPT | Performed by: HOSPITALIST

## 2022-10-22 PROCEDURE — 80069 RENAL FUNCTION PANEL: CPT | Performed by: INTERNAL MEDICINE

## 2022-10-22 PROCEDURE — 94640 AIRWAY INHALATION TREATMENT: CPT | Mod: 76

## 2022-10-22 PROCEDURE — 99232 SBSQ HOSP IP/OBS MODERATE 35: CPT | Performed by: INTERNAL MEDICINE

## 2022-10-22 PROCEDURE — 85610 PROTHROMBIN TIME: CPT | Performed by: HOSPITALIST

## 2022-10-22 PROCEDURE — 97535 SELF CARE MNGMENT TRAINING: CPT | Mod: GO

## 2022-10-22 PROCEDURE — 85025 COMPLETE CBC W/AUTO DIFF WBC: CPT | Performed by: INTERNAL MEDICINE

## 2022-10-22 PROCEDURE — 94640 AIRWAY INHALATION TREATMENT: CPT

## 2022-10-22 PROCEDURE — 999N000157 HC STATISTIC RCP TIME EA 10 MIN

## 2022-10-22 PROCEDURE — 250N000009 HC RX 250: Performed by: INTERNAL MEDICINE

## 2022-10-22 PROCEDURE — 36415 COLL VENOUS BLD VENIPUNCTURE: CPT | Performed by: INTERNAL MEDICINE

## 2022-10-22 PROCEDURE — 99233 SBSQ HOSP IP/OBS HIGH 50: CPT | Performed by: INTERNAL MEDICINE

## 2022-10-22 PROCEDURE — 94799 UNLISTED PULMONARY SVC/PX: CPT

## 2022-10-22 PROCEDURE — 210N000001 HC R&B IMCU HEART CARE

## 2022-10-22 PROCEDURE — 250N000013 HC RX MED GY IP 250 OP 250 PS 637: Performed by: HOSPITALIST

## 2022-10-22 RX ORDER — IPRATROPIUM BROMIDE AND ALBUTEROL SULFATE 2.5; .5 MG/3ML; MG/3ML
3 SOLUTION RESPIRATORY (INHALATION)
Status: DISCONTINUED | OUTPATIENT
Start: 2022-10-22 | End: 2022-10-23

## 2022-10-22 RX ORDER — ACETYLCYSTEINE 200 MG/ML
2 SOLUTION ORAL; RESPIRATORY (INHALATION) 4 TIMES DAILY
Status: DISCONTINUED | OUTPATIENT
Start: 2022-10-22 | End: 2022-10-23

## 2022-10-22 RX ORDER — ACETYLCYSTEINE 200 MG/ML
2 SOLUTION ORAL; RESPIRATORY (INHALATION)
Status: DISCONTINUED | OUTPATIENT
Start: 2022-10-22 | End: 2022-10-22

## 2022-10-22 RX ORDER — GUAIFENESIN 600 MG/1
600 TABLET, EXTENDED RELEASE ORAL 2 TIMES DAILY
Status: DISCONTINUED | OUTPATIENT
Start: 2022-10-22 | End: 2022-10-24 | Stop reason: HOSPADM

## 2022-10-22 RX ORDER — WARFARIN SODIUM 5 MG/1
5 TABLET ORAL
Status: COMPLETED | OUTPATIENT
Start: 2022-10-22 | End: 2022-10-22

## 2022-10-22 RX ADMIN — GUAIFENESIN 600 MG: 600 TABLET ORAL at 11:19

## 2022-10-22 RX ADMIN — TAMSULOSIN HYDROCHLORIDE 0.4 MG: 0.4 CAPSULE ORAL at 20:59

## 2022-10-22 RX ADMIN — PRIMIDONE 50 MG: 50 TABLET ORAL at 09:09

## 2022-10-22 RX ADMIN — WARFARIN SODIUM 5 MG: 5 TABLET ORAL at 17:26

## 2022-10-22 RX ADMIN — METOPROLOL TARTRATE 12.5 MG: 25 TABLET, FILM COATED ORAL at 20:55

## 2022-10-22 RX ADMIN — GUAIFENESIN 600 MG: 600 TABLET ORAL at 20:59

## 2022-10-22 RX ADMIN — IPRATROPIUM BROMIDE AND ALBUTEROL SULFATE 3 ML: .5; 3 SOLUTION RESPIRATORY (INHALATION) at 19:56

## 2022-10-22 RX ADMIN — ACETYLCYSTEINE 2 ML: 200 SOLUTION ORAL; RESPIRATORY (INHALATION) at 10:50

## 2022-10-22 RX ADMIN — ACETYLCYSTEINE 2 ML: 200 SOLUTION ORAL; RESPIRATORY (INHALATION) at 16:14

## 2022-10-22 RX ADMIN — PRIMIDONE 50 MG: 50 TABLET ORAL at 20:54

## 2022-10-22 RX ADMIN — TAMSULOSIN HYDROCHLORIDE 0.4 MG: 0.4 CAPSULE ORAL at 09:11

## 2022-10-22 RX ADMIN — DOXAZOSIN 2 MG: 1 TABLET ORAL at 09:11

## 2022-10-22 RX ADMIN — IPRATROPIUM BROMIDE AND ALBUTEROL SULFATE 3 ML: .5; 3 SOLUTION RESPIRATORY (INHALATION) at 10:49

## 2022-10-22 RX ADMIN — BUMETANIDE 2 MG: 2 TABLET ORAL at 09:07

## 2022-10-22 RX ADMIN — FINASTERIDE 5 MG: 5 TABLET, FILM COATED ORAL at 09:06

## 2022-10-22 RX ADMIN — IPRATROPIUM BROMIDE AND ALBUTEROL SULFATE 3 ML: .5; 3 SOLUTION RESPIRATORY (INHALATION) at 16:14

## 2022-10-22 RX ADMIN — HYDRALAZINE HYDROCHLORIDE 100 MG: 50 TABLET, FILM COATED ORAL at 09:11

## 2022-10-22 RX ADMIN — PRIMIDONE 50 MG: 50 TABLET ORAL at 13:22

## 2022-10-22 RX ADMIN — METOPROLOL TARTRATE 12.5 MG: 25 TABLET, FILM COATED ORAL at 09:07

## 2022-10-22 RX ADMIN — ACETYLCYSTEINE 2 ML: 200 SOLUTION ORAL; RESPIRATORY (INHALATION) at 19:56

## 2022-10-22 RX ADMIN — SIMVASTATIN 20 MG: 10 TABLET, FILM COATED ORAL at 21:02

## 2022-10-22 ASSESSMENT — ACTIVITIES OF DAILY LIVING (ADL)
ADLS_ACUITY_SCORE: 43

## 2022-10-22 NOTE — PROGRESS NOTES
Hospitalist Progress Note    Assessment/Plan  Carlos Rubio is a 89 year old male admitted on 10/18/2022. He comes in with leg swelling and elevated BP.    Hypertensive Urgency versus emergency, POA  -Presented with evidence of heart failure.  Unclear if this is sequelae of uncontrolled BP or elevated BP cardiogenic.    -BP reasonably controlled at this time, however not at goal.    -Continue lisinopril/Cardura/metoprolol.  Adjust dose to optimize control.    -As needed IV hydralazine   -Low-sodium diet   -Check TSH/UA   -Follow echocardiogram   -Close monitor on telemetry.   10/20  -TSH WNL  -Echocardiogram with EF of 55 to 60%, moderate aortic stenosis, and moderate pulmonary hypertension.  -BP improving with current therapy.  Will hold lisinopril in the setting of poor renal function.  -Further management as stated above  10/21  -Improved BP.  Continue to hold lisinopril.  Continue hydralazine, and metoprolol.  10/22  -BP controlled.  Continue current therapy as stated above.    Acute kidney injury.  Etiology not quite clear.  Pre-/post renal.  -Patient has had urinary retention over the course of the last 24 hours.  Last bladder scan with 900 cc.  Patient also on Lasix therapy.  (Cardiology notes reviewed, appreciate input.  Transition to Lasix drip)  -Creatinine bumped up to 1.19 from 0.79.  -We will place Sanders catheter.  -Avoid hypotension/nephrotoxins  -I will consult nephrology if continued worsening.  -Renally dose medications.  -Monitor closely.  10/21  -Worsening creatinine today.  -We will hold Lasix drip.  -Nephrology on board.  10/22  -Continued worsening of creatinine: 0.79-->1.19-->1.24-->1.94  -Patient's appears euvolemic to me at this time.  I will appreciate nephrology/cardiology input.  Will hold Bumex therapy at this time.     HFpEF  -Hypervolemic at this time.    -?  Decompensation.  Breathing at room air with good O2 sats  -Adequately diuresing.  We will continue IV Lasix regimen.  -Strict  monitor input/output.  -Daily weights/fluid restriction  -Cardiology notes reviewed, appreciate input.  -Echo of 2015 noted with EF of 65%.  Awaiting repeat echo.  -Close monitor on telemetry.  10/20  -Adequate BP control.  Continued diuresis.  -Defer management To Cardiology.  -Hold lisinopril.  10/21  -Hold lisinopril/Lasix drip, in the setting of elevated creatinine.  -Clinically euvolemic (from physical exam)  -?  Transition to oral diuretics.  Defer to cardiology.  10/22  -Patient appears euvolemic to dry.  Breathing at room air with sats greater than 90%.  -Currently on oral Bumex.  Will hold this medication, in the setting of continued worsening renal function.  -Continue to hold lisinopril.  -Continue fluid restriction  -Close monitor     Atrial Fibrillation   Supratherapeutic INR  -Rate is controlled  -Metoprolol started by cardiology, will discontinue Propranolol  -Continue on coumadin, pharmacy to dose relative INR.; INR noted at 2.17 today    Difficulty with coughing up secretions overnight  -Start Mucomyst therapy.  -Start DuoNeb therapy.  -Flutter valve.  -Pulmonary toileting.  -Continue guaifenesin.  -O2 sats noted at 94% on room air.  -Close watch.    Hyperlipidemia   -Continue statin therapy.       BPH  -continue flomax and proscar  10/20  -Patient confided in me that he has been having long-term frequency from prostate issues.  Denies dysuria.  -Urinary retention today (about 900 cc from bladder scan)  -UA with reflex to microscopy and culture.  -Orders to place Sanders catheter.  -Urology consult.  10/21  -Urology consult notes reviewed, appreciate input: To continue with Sanders catheter in situ for 1 to 2 weeks.  Outpatient follow-up with urology.  -Adequate urine drainage.       Disposition Plan   Expected discharge pending clinical progress.  Follow creatinine.      Subjective  Denies any new complaints.  New issues.  No acute events overnight.    Objective    Vital signs in last 24 hours  Temp:   [97.6  F (36.4  C)-99.5  F (37.5  C)] 97.9  F (36.6  C)  Pulse:  [73-94] 73  Resp:  [18-24] 20  BP: ()/(53-79) 110/61  SpO2:  [93 %-94 %] 94 % @LASTSAO2(12)@ O2 Device: None (Room air)    Weight:   Wt Readings from Last 3 Encounters:   10/22/22 78.2 kg (172 lb 8 oz)   04/08/22 81.6 kg (180 lb)   01/26/21 85.7 kg (189 lb)      Weight change: 0 kg (0 lb)    Intake/Output last 3 shifts  I/O last 3 completed shifts:  In: 500 [P.O.:500]  Out: 350 [Urine:350]  Body mass index is 27.43 kg/m .    Physical Exam    General Appearance:    Alert, cooperative, no distress, appears stated age   Lungs:     Clear bilaterally    Cardiovascular:    Regular rate ands rhythm.  Normal S1, S2.  No murmur, rub or gallop.  No edema   Abdomen:     Soft, non-tender, bowel sounds active all four quadrants,     no masses, no organomegaly   Neurologic:   Awake, alert, oriented x 3.  Grossly nonfocal      Pertinent Labs   Lab Results: personally reviewed.   Recent Labs   Lab 10/22/22  0423 10/21/22  0459 10/20/22  0448 10/18/22  2048 10/18/22  1901    137 137   < > 136   CO2 27 27 31*   < > 29   BUN 42.3* 30.2* 24.5*   < > 13.4   ALBUMIN 3.7  --   --   --  3.9   ALKPHOS  --   --   --   --  104   ALT  --   --   --   --  13   AST  --   --   --   --  24    < > = values in this interval not displayed.     Recent Labs   Lab 10/21/22  0459 10/20/22  0448 10/19/22  0938   WBC 13.7* 12.5* 14.0*   HGB 13.5 13.5 14.1   HCT 41.3 41.9 42.8    288 288     No results for input(s): CKTOTAL, TROPONINI in the last 168 hours.    Invalid input(s): TROPONINT, CKMBINDEX  Invalid input(s): POCGLUFGR    Medications  Current Facility-Administered Medications   Medication     acetaminophen (TYLENOL) tablet 650 mg    Or     acetaminophen (TYLENOL) Suppository 650 mg     acetylcysteine (MUCOMYST) 20 % nebulizer solution 2 mL     bumetanide (BUMEX) tablet 2 mg     Continuing ACE inhibitor/ARB/ARNI from home medication list OR ACE inhibitor/ARB/ARNI order  already placed during this visit     Continuing beta blocker from home medication list OR beta blocker order already placed during this visit     doxazosin (CARDURA) tablet 2 mg     finasteride (PROSCAR) tablet 5 mg     guaiFENesin (MUCINEX) 12 hr tablet 600 mg     hydrALAZINE (APRESOLINE) injection 10 mg     hydrALAZINE (APRESOLINE) tablet 100 mg     lidocaine (LMX4) cream     lidocaine 1 % 0.1-1 mL     [Held by provider] lisinopril (ZESTRIL) tablet 40 mg     melatonin tablet 1 mg     metoprolol tartrate (LOPRESSOR) half-tab 12.5 mg     nitroGLYcerin (NITRODUR) Patch in Place     ondansetron (ZOFRAN ODT) ODT tab 4 mg    Or     ondansetron (ZOFRAN) injection 4 mg     Patient is already receiving anticoagulation with heparin, enoxaparin (LOVENOX), warfarin (COUMADIN)  or other anticoagulant medication     primidone (MYSOLINE) tablet 50 mg     simvastatin (ZOCOR) tablet 20 mg     sodium chloride (PF) 0.9% PF flush 3 mL     sodium chloride (PF) 0.9% PF flush 3 mL     tamsulosin (FLOMAX) capsule 0.4 mg     Warfarin Dose Required Daily - Pharmacist Managed       Pertinent Radiology   Radiology Results: Personally reviewed   Results for orders placed or performed during the hospital encounter of 10/18/22   XR Chest Port 1 View    Impression    IMPRESSION: Interval development of patchy opacities in the lungs which could represent an infectious or an inflammatory process. No adenopathy or effusion. Mild cardiac enlargement. Normal pulmonary vascularity. Mild elevation of the right   hemidiaphragm. Benign bone island left seventh rib anteriorly. Healed fracture deformities of multiple left ribs. Degenerative changes both shoulders and the spine. Mildly atherosclerotic thoracic aorta. Monitoring leads have been placed. The patient is   slightly rotated.         Latasha Strong DO  Internal Medicine Hospitalist  10/22/2022

## 2022-10-22 NOTE — PLAN OF CARE
Goal Outcome Evaluation:    Maintain BP range and Afib at rate acceptable.  Pt. Has CHF and diuretic was held by  Yesterday.   On call paged for range for BP medications.

## 2022-10-22 NOTE — PROGRESS NOTES
CARDIOLOGY PROGRESS NOTE      Assessment/Plan:  1.  Acute on chronic diastolic congestive heart failure (H)-acute on chronic in the setting of preserved ejection fraction of 55 to 60%.  Weight down 3 kg, oxygen saturation 93% on room air, still symptomatically short of breath with what sounds like some PND and orthopnea.  On oral Bumex now, creatinine increased to 1.94.  Holding lisinopril.  Continue to monitor symptoms.  2.  Persistent atrial fibrillation (H)-permanent, not valvular, asymptomatic, on anticoagulation with Coumadin, most recent INR 2.17 from today.  3.  Hypertensive urgency-resolved, currently lisinopril on hold, given age would lower dose of hydralazine slightly.  4.  Acute kidney failure with tubular necrosis (H)-so noted with increased creatinine to 1.94 and defer to nephrology.  5.  BPH- on finasteride.  6.  Aortic stenosis- moderate with peak velocity of 2.6 m/s, mean gradient of 17 mmHg, aortic valve area 1.1 cm .  Recheck echo in 6 months.  7.  Pulmonary hypertension- at least moderate with estimated pressures of 65 mmHg.  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    Plan:  1.  Oral Bumex.  2.  Creatinine management per nephrology.  3.  Would lower dose of hydralazine mildly.    Discharge Plannin.  Barrier to discharge is resolution of symptoms and stabilization of medical situation.  2.  Can follow with me as primary cardiologist.     LOS: 3 days     Subjective:  Interval History:    89-year-old white gentleman being seen on fourth day of hospitalization, son is present.  He complains of sinus drainage, shortness of breath when he lies flat with PND and orthopnea.  No significant chest pains, palpitations, syncope, dizziness or peripheral edema.    Medications    acetylcysteine  2 mL Nebulization Q4H [facility]     bumetanide  2 mg Oral Daily     doxazosin  2 mg Oral Daily     finasteride  5 mg Oral Daily     guaiFENesin  600 mg Oral BID     hydrALAZINE  100 mg Oral  4x Daily     ipratropium - albuterol 0.5 mg/2.5 mg/3 mL  3 mL Nebulization 4x daily     [Held by provider] lisinopril  40 mg Oral Daily     metoprolol tartrate  12.5 mg Oral BID     nitroGLYcerin   Transdermal Q8H FLORENCIO     primidone  50 mg Oral TID     simvastatin  20 mg Oral At Bedtime     sodium chloride (PF)  3 mL Intracatheter Q8H     tamsulosin  0.4 mg Oral BID     Warfarin Therapy Reminder  1 each Oral See Admin Instructions     Objective:   Vital signs in last 24 hours:  Temp:  [97.5  F (36.4  C)-99.5  F (37.5  C)] 97.5  F (36.4  C)  Pulse:  [67-94] 67  Resp:  [18-24] 20  BP: ()/(53-61) 104/59  SpO2:  [92 %-94 %] 92 %    Physical Exam:  /59 (BP Location: Left arm)   Pulse 67   Temp 97.5  F (36.4  C) (Oral)   Resp 20   Wt 78.2 kg (172 lb 8 oz)   SpO2 92%   BMI 27.43 kg/m      General Appearance:    Alert, cooperative, no distress, appears stated age   Head:    Normocephalic, without obvious abnormality, atraumatic   Throat:   Lips, mucosa, and tongue normal; teeth and gums normal   Neck:   Supple, symmetrical, trachea midline, no adenopathy;        thyroid:  No enlargement/tenderness/nodules; no carotid    bruit or JVD   Back:     Symmetric, no curvature, ROM normal, no CVA tenderness   Lungs:     Clear to auscultation bilaterally, respirations unlabored   Chest wall:    No tenderness or deformity   Heart:    Irregularly irregular, S1 and S2 normal, no murmur, rub   or gallop   Abdomen:     Soft, non-tender, bowel sounds active all four quadrants,     no masses, no organomegaly   Extremities:   Normal, atraumatic, no cyanosis or edema   Pulses:   2+ and symmetric all extremities   Skin:   Skin color, texture, turgor normal, no rashes or lesions     Cardiographics:      ECG: Personally reviewed by myself shows atrial fibrillation, occasional PVC, nonspecific ST-T wave changes.    Echocardiogram:   1. Normal left ventricular size. Mild left ventricular hypertrophy. Left ventricular systolic  function is normal. Left ventricular ejection fraction estimated 55 to 60%.  2. Right ventricle appears mild to moderately dilated. Right ventricular systolic function grossly appears normal.  3. Moderate biatrial enlargement.  4. Moderate calcific aortic stenosis. Peak velocity 2.6 m/s. Mean gradient 17 mmHg. Calculated valve area of approximately 1.1 to 1.4 cmÂ .  5. Pulmonary hypertension. Estimate of RV systolic pressure is elevated at 55 mmHg plus right atrial pressure suggesting at least moderate pulmonary hypertension.  6. Compared to the examination dated December 2015. Moderate aortic stenosis is now suggested. Estimate of RV systolic pressure is higher on the current examination.      Lab Results:   Recent Labs   Lab 10/22/22  0421   WBC 13.4*   HGB 12.6*   HCT 38.6*        Recent Labs   Lab 10/22/22  0423      CO2 27   BUN 42.3*   .       No results found for: CKTOTAL, CKMB, TROPONINI

## 2022-10-22 NOTE — PROGRESS NOTES
Care Management Follow Up    Length of Stay (days): 3    Expected Discharge Date: 10/23/2022     Concerns to be Addressed: Discharge planning   Patient plan of care discussed at interdisciplinary rounds: Yes    Anticipated Discharge Disposition: Home     Anticipated Discharge Services:  PT/OT  Anticipated Discharge DME:      Education Provided on the Discharge Plan:  Yes  Patient/Family in Agreement with the Plan:  Yes    Referrals Placed by CM/SW:  Home care  Private pay costs discussed: Not applicable    Additional Information:  Pt lives alone in a Independent senior apartment. Building does not have services. Spouse  suddenly in  and  Has all her DMEs at home. 3 of 6 children live local and are supportive. Family to transport at discharge.    SW met with patient and his son to discuss therapy recommendations of home care. Patient agreed to home care. Referrals sent.     [facility] accepted patient for PT/OT.     Janie Taylor

## 2022-10-22 NOTE — PLAN OF CARE
"  Problem: Plan of Care - These are the overarching goals to be used throughout the patient stay.    Goal: Patient-Specific Goal (Individualized)  Description: You can add care plan individualizations to a care plan. Examples of Individualization might be:  \"Parent requests to be called daily at 9am for status\", \"I have a hard time hearing out of my right ear\", or \"Do not touch me to wake me up as it startles me\".  Outcome: Progressing     Problem: Plan of Care - These are the overarching goals to be used throughout the patient stay.    Goal: Optimal Comfort and Wellbeing  Outcome: Progressing  Intervention: Provide Person-Centered Care  Recent Flowsheet Documentation  Taken 10/22/2022 1715 by Jaycee Mar RN  Trust Relationship/Rapport:   care explained   choices provided  Taken 10/22/2022 1130 by Jaycee Mar RN  Trust Relationship/Rapport:   care explained   choices provided  Taken 10/22/2022 0900 by Jaycee Mar RN  Trust Relationship/Rapport:   care explained   choices provided   Goal Outcome Evaluation:             VSS. Blood pressure soft at times in the 90's to low 100's, Hydralazine held twice this shift. Patient up in chair for meals, sleeping in between cares. Continue to monitor           "

## 2022-10-22 NOTE — PROGRESS NOTES
Pt complains of thick secretions from nasal drainage that he feels he can not cough up.  Mucomyst and duoneb given with aerobika. Lungs sounds clear/diminished, sats 93% on room air.     Iva Mitchell, RT on 10/22/2022 at 2:42 PM

## 2022-10-22 NOTE — PLAN OF CARE
"  Problem: Plan of Care - These are the overarching goals to be used throughout the patient stay.    Goal: Patient-Specific Goal (Individualized)  Description: You can add care plan individualizations to a care plan. Examples of Individualization might be:  \"Parent requests to be called daily at 9am for status\", \"I have a hard time hearing out of my right ear\", or \"Do not touch me to wake me up as it startles me\".  Outcome: Progressing     Problem: Risk for Delirium  Goal: Improved Behavioral Control  Outcome: Progressing  Intervention: Minimize Safety Risk  Recent Flowsheet Documentation  Taken 10/21/2022 1500 by Jaycee Mar, RN  Enhanced Safety Measures: bed alarm set  Taken 10/21/2022 1130 by Jaycee Mar RN  Enhanced Safety Measures: bed alarm set  Taken 10/21/2022 0845 by Jaycee Mar, RN  Enhanced Safety Measures: bed alarm set   Goal Outcome Evaluation:         VSS. Patient up in chair this morning for breakfast, back in bed right before 1200 noon. Patient very sleepy most of the afternoon, unable to eat lunch. Patient more awake this evening, up in chair for dinner. Continue to monitor               "

## 2022-10-22 NOTE — PROGRESS NOTES
Pt. Had thick secretions he felt he could not get up.  Did cough up some thick clear mucus  but was unable to clear so set up a younkers suction for him. Which helped.  He was using his accessory muscles, and had expiratory wheezes.

## 2022-10-23 ENCOUNTER — APPOINTMENT (OUTPATIENT)
Dept: OCCUPATIONAL THERAPY | Facility: HOSPITAL | Age: 87
DRG: 304 | End: 2022-10-23
Payer: COMMERCIAL

## 2022-10-23 LAB
ANION GAP SERPL CALCULATED.3IONS-SCNC: 14 MMOL/L (ref 7–15)
BASOPHILS # BLD AUTO: 0 10E3/UL (ref 0–0.2)
BASOPHILS NFR BLD AUTO: 0 %
BUN SERPL-MCNC: 52.3 MG/DL (ref 8–23)
CALCIUM SERPL-MCNC: 9 MG/DL (ref 8.2–9.6)
CHLORIDE SERPL-SCNC: 95 MMOL/L (ref 98–107)
CREAT SERPL-MCNC: 1.77 MG/DL (ref 0.67–1.17)
DEPRECATED HCO3 PLAS-SCNC: 26 MMOL/L (ref 22–29)
EOSINOPHIL # BLD AUTO: 0.1 10E3/UL (ref 0–0.7)
EOSINOPHIL NFR BLD AUTO: 0 %
ERYTHROCYTE [DISTWIDTH] IN BLOOD BY AUTOMATED COUNT: 13.5 % (ref 10–15)
GFR SERPL CREATININE-BSD FRML MDRD: 36 ML/MIN/1.73M2
GLUCOSE SERPL-MCNC: 108 MG/DL (ref 70–99)
HCT VFR BLD AUTO: 42.1 % (ref 40–53)
HGB BLD-MCNC: 13.8 G/DL (ref 13.3–17.7)
IMM GRANULOCYTES # BLD: 0.1 10E3/UL
IMM GRANULOCYTES NFR BLD: 1 %
INR PPP: 2.63 (ref 0.85–1.15)
LYMPHOCYTES # BLD AUTO: 1.4 10E3/UL (ref 0.8–5.3)
LYMPHOCYTES NFR BLD AUTO: 9 %
MCH RBC QN AUTO: 32.3 PG (ref 26.5–33)
MCHC RBC AUTO-ENTMCNC: 32.8 G/DL (ref 31.5–36.5)
MCV RBC AUTO: 99 FL (ref 78–100)
MONOCYTES # BLD AUTO: 1.1 10E3/UL (ref 0–1.3)
MONOCYTES NFR BLD AUTO: 7 %
NEUTROPHILS # BLD AUTO: 12.4 10E3/UL (ref 1.6–8.3)
NEUTROPHILS NFR BLD AUTO: 83 %
NRBC # BLD AUTO: 0 10E3/UL
NRBC BLD AUTO-RTO: 0 /100
PLATELET # BLD AUTO: 283 10E3/UL (ref 150–450)
POTASSIUM SERPL-SCNC: 3.7 MMOL/L (ref 3.4–5.3)
POTASSIUM SERPL-SCNC: 3.8 MMOL/L (ref 3.4–5.3)
RBC # BLD AUTO: 4.27 10E6/UL (ref 4.4–5.9)
SODIUM SERPL-SCNC: 135 MMOL/L (ref 136–145)
WBC # BLD AUTO: 15 10E3/UL (ref 4–11)

## 2022-10-23 PROCEDURE — 84132 ASSAY OF SERUM POTASSIUM: CPT | Performed by: INTERNAL MEDICINE

## 2022-10-23 PROCEDURE — 97110 THERAPEUTIC EXERCISES: CPT | Mod: GO

## 2022-10-23 PROCEDURE — 36415 COLL VENOUS BLD VENIPUNCTURE: CPT | Performed by: INTERNAL MEDICINE

## 2022-10-23 PROCEDURE — 85610 PROTHROMBIN TIME: CPT | Performed by: HOSPITALIST

## 2022-10-23 PROCEDURE — 250N000013 HC RX MED GY IP 250 OP 250 PS 637: Performed by: INTERNAL MEDICINE

## 2022-10-23 PROCEDURE — 94640 AIRWAY INHALATION TREATMENT: CPT

## 2022-10-23 PROCEDURE — 82310 ASSAY OF CALCIUM: CPT | Performed by: INTERNAL MEDICINE

## 2022-10-23 PROCEDURE — 99232 SBSQ HOSP IP/OBS MODERATE 35: CPT | Performed by: INTERNAL MEDICINE

## 2022-10-23 PROCEDURE — 94640 AIRWAY INHALATION TREATMENT: CPT | Mod: 76

## 2022-10-23 PROCEDURE — 999N000157 HC STATISTIC RCP TIME EA 10 MIN

## 2022-10-23 PROCEDURE — 250N000009 HC RX 250: Performed by: INTERNAL MEDICINE

## 2022-10-23 PROCEDURE — 250N000013 HC RX MED GY IP 250 OP 250 PS 637: Performed by: HOSPITALIST

## 2022-10-23 PROCEDURE — 97535 SELF CARE MNGMENT TRAINING: CPT | Mod: GO

## 2022-10-23 PROCEDURE — 210N000001 HC R&B IMCU HEART CARE

## 2022-10-23 PROCEDURE — 85025 COMPLETE CBC W/AUTO DIFF WBC: CPT | Performed by: INTERNAL MEDICINE

## 2022-10-23 RX ORDER — WARFARIN SODIUM 2.5 MG/1
2.5 TABLET ORAL
Status: COMPLETED | OUTPATIENT
Start: 2022-10-23 | End: 2022-10-23

## 2022-10-23 RX ORDER — BUMETANIDE 1 MG/1
1 TABLET ORAL
Status: DISCONTINUED | OUTPATIENT
Start: 2022-10-23 | End: 2022-10-24 | Stop reason: HOSPADM

## 2022-10-23 RX ORDER — IPRATROPIUM BROMIDE AND ALBUTEROL SULFATE 2.5; .5 MG/3ML; MG/3ML
3 SOLUTION RESPIRATORY (INHALATION) 4 TIMES DAILY PRN
Status: DISCONTINUED | OUTPATIENT
Start: 2022-10-23 | End: 2022-10-24 | Stop reason: HOSPADM

## 2022-10-23 RX ORDER — ACETYLCYSTEINE 200 MG/ML
2 SOLUTION ORAL; RESPIRATORY (INHALATION) 4 TIMES DAILY PRN
Status: DISCONTINUED | OUTPATIENT
Start: 2022-10-23 | End: 2022-10-24 | Stop reason: HOSPADM

## 2022-10-23 RX ORDER — HYDRALAZINE HYDROCHLORIDE 50 MG/1
50 TABLET, FILM COATED ORAL 4 TIMES DAILY
Status: DISCONTINUED | OUTPATIENT
Start: 2022-10-23 | End: 2022-10-24 | Stop reason: HOSPADM

## 2022-10-23 RX ADMIN — SIMVASTATIN 20 MG: 10 TABLET, FILM COATED ORAL at 23:16

## 2022-10-23 RX ADMIN — PRIMIDONE 50 MG: 50 TABLET ORAL at 14:46

## 2022-10-23 RX ADMIN — BUMETANIDE 1 MG: 1 TABLET ORAL at 17:22

## 2022-10-23 RX ADMIN — GUAIFENESIN 600 MG: 600 TABLET ORAL at 20:46

## 2022-10-23 RX ADMIN — HYDRALAZINE HYDROCHLORIDE 50 MG: 50 TABLET, FILM COATED ORAL at 17:23

## 2022-10-23 RX ADMIN — DOXAZOSIN 2 MG: 1 TABLET ORAL at 08:49

## 2022-10-23 RX ADMIN — HYDRALAZINE HYDROCHLORIDE 50 MG: 50 TABLET, FILM COATED ORAL at 20:43

## 2022-10-23 RX ADMIN — TAMSULOSIN HYDROCHLORIDE 0.4 MG: 0.4 CAPSULE ORAL at 08:51

## 2022-10-23 RX ADMIN — ACETYLCYSTEINE 2 ML: 200 SOLUTION ORAL; RESPIRATORY (INHALATION) at 12:58

## 2022-10-23 RX ADMIN — WARFARIN SODIUM 2.5 MG: 2.5 TABLET ORAL at 17:22

## 2022-10-23 RX ADMIN — GUAIFENESIN 600 MG: 600 TABLET ORAL at 08:51

## 2022-10-23 RX ADMIN — IPRATROPIUM BROMIDE AND ALBUTEROL SULFATE 3 ML: .5; 3 SOLUTION RESPIRATORY (INHALATION) at 12:58

## 2022-10-23 RX ADMIN — BUMETANIDE 1 MG: 1 TABLET ORAL at 12:39

## 2022-10-23 RX ADMIN — METOPROLOL TARTRATE 12.5 MG: 25 TABLET, FILM COATED ORAL at 08:50

## 2022-10-23 RX ADMIN — ACETYLCYSTEINE 2 ML: 200 SOLUTION ORAL; RESPIRATORY (INHALATION) at 07:53

## 2022-10-23 RX ADMIN — IPRATROPIUM BROMIDE AND ALBUTEROL SULFATE 3 ML: .5; 3 SOLUTION RESPIRATORY (INHALATION) at 07:54

## 2022-10-23 RX ADMIN — PRIMIDONE 50 MG: 50 TABLET ORAL at 08:51

## 2022-10-23 RX ADMIN — TAMSULOSIN HYDROCHLORIDE 0.4 MG: 0.4 CAPSULE ORAL at 20:45

## 2022-10-23 RX ADMIN — FINASTERIDE 5 MG: 5 TABLET, FILM COATED ORAL at 08:50

## 2022-10-23 RX ADMIN — HYDRALAZINE HYDROCHLORIDE 50 MG: 50 TABLET, FILM COATED ORAL at 12:39

## 2022-10-23 RX ADMIN — PRIMIDONE 50 MG: 50 TABLET ORAL at 20:46

## 2022-10-23 RX ADMIN — METOPROLOL TARTRATE 12.5 MG: 25 TABLET, FILM COATED ORAL at 20:45

## 2022-10-23 ASSESSMENT — ACTIVITIES OF DAILY LIVING (ADL)
ADLS_ACUITY_SCORE: 43

## 2022-10-23 NOTE — PLAN OF CARE
"  Problem: Plan of Care - These are the overarching goals to be used throughout the patient stay.    Goal: Patient-Specific Goal (Individualized)  Description: You can add care plan individualizations to a care plan. Examples of Individualization might be:  \"Parent requests to be called daily at 9am for status\", \"I have a hard time hearing out of my right ear\", or \"Do not touch me to wake me up as it startles me\".  Outcome: Progressing     Problem: Risk for Delirium  Goal: Optimal Coping  Outcome: Progressing   Goal Outcome Evaluation:         Blood pressure slightly elevated in the 140's, schedule medications given. Patient had few episodes of bradycardia while sleeping, did not sustain. Patient up in chair for meals, good appetite this shift. Patient coughing less today, remains on room air. Continue to monitor               "

## 2022-10-23 NOTE — PROVIDER NOTIFICATION
RCAT Treatment Plan    Patient Score: 2  Patient Acuity: 5    Clinical Indication for Therapy: productive cough    Therapy Ordered: Mucomyst QID, Duoneb QID, Flutter valve QID    Assessment Summary: Overnight PT had a coughing episode with difficulty bringing up secretions. Since that time, PT hasn't had any issues coughing, difficulty breathing or hypoxia. PT doesn't have any history of chronic pulmonary disease. Recommend to change nebs and flutter valve to PRN    Talha Puentes, RT  10/23/2022

## 2022-10-23 NOTE — PROGRESS NOTES
Hospitalist Progress Note    Assessment/Plan  Carlos Rubio is a 89 year old male admitted on 10/18/2022. He comes in with leg swelling and elevated BP.    Hypertensive Urgency versus emergency, POA  -Presented with evidence of heart failure.  Unclear if this is sequelae of uncontrolled BP or elevated BP cardiogenic.    -BP reasonably controlled at this time, however not at goal.    -Continue lisinopril/Cardura/metoprolol.  Adjust dose to optimize control.    -As needed IV hydralazine   -Low-sodium diet   -Check TSH/UA   -Follow echocardiogram   -Close monitor on telemetry.   10/20  -TSH WNL  -Echocardiogram with EF of 55 to 60%, moderate aortic stenosis, and moderate pulmonary hypertension.  -BP improving with current therapy.  Will hold lisinopril in the setting of poor renal function.  -Further management as stated above  10/21  -Improved BP.  Continue to hold lisinopril.  Continue hydralazine, and metoprolol.  10/22  -BP controlled.  Continue current therapy as stated above.  10/23  -Resolved.  Continue metoprolol/hydralazine 12.  To maintain BP.    Acute kidney injury.  Etiology not quite clear.  Pre-/post renal.  -Patient has had urinary retention over the course of the last 24 hours.  Last bladder scan with 900 cc.  Patient also on Lasix therapy.  (Cardiology notes reviewed, appreciate input.  Transition to Lasix drip)  -Creatinine bumped up to 1.19 from 0.79.  -We will place Sanders catheter.  -Avoid hypotension/nephrotoxins  -I will consult nephrology if continued worsening.  -Renally dose medications.  -Monitor closely.  10/21  -Worsening creatinine today.  -We will hold Lasix drip.  -Nephrology on board.  10/22  -Continued worsening of creatinine: 0.79-->1.19-->1.24-->1.94  -Patient's appears euvolemic to me at this time.  I will appreciate nephrology/cardiology input.  Will hold Bumex therapy at this time.  10/23  -Creatinine improved to 1.77 from 1.94, after holding Bumex therapy.  However Bumex resumed  per cardiology at the lower dose.  Will watch for now.     HFpEF  -Hypervolemic at this time.    -?  Decompensation.  Breathing at room air with good O2 sats  -Adequately diuresing.  We will continue IV Lasix regimen.  -Strict monitor input/output.  -Daily weights/fluid restriction  -Cardiology notes reviewed, appreciate input.  -Echo of 2015 noted with EF of 65%.  Awaiting repeat echo.  -Close monitor on telemetry.  10/20  -Adequate BP control.  Continued diuresis.  -Defer management To Cardiology.  -Hold lisinopril.  10/21  -Hold lisinopril/Lasix drip, in the setting of elevated creatinine.  -Clinically euvolemic (from physical exam)  -?  Transition to oral diuretics.  Defer to cardiology.  10/22  -Patient appears euvolemic to dry.  Breathing at room air with sats greater than 90%.  -Currently on oral Bumex.  Will hold this medication, in the setting of continued worsening renal function.  -Continue to hold lisinopril.  -Continue fluid restriction  -Close monitor     Atrial Fibrillation   Supratherapeutic INR  -Rate is controlled  -Metoprolol started by cardiology, will discontinue Propranolol  -Continue on coumadin, pharmacy to dose relative INR.; INR noted at 2.17 today    Difficulty with coughing up secretions overnight  -Start Mucomyst therapy.  -Start DuoNeb therapy.  -Flutter valve.  -Pulmonary toileting.  -Continue guaifenesin.  -O2 sats noted at 94% on room air.  -Close watch.    Hyperlipidemia   -Continue statin therapy.       BPH  -continue flomax and proscar  10/20  -Patient confided in me that he has been having long-term frequency from prostate issues.  Denies dysuria.  -Urinary retention today (about 900 cc from bladder scan)  -UA with reflex to microscopy and culture.  -Orders to place Sanders catheter.  -Urology consult.  10/21  -Urology consult notes reviewed, appreciate input: To continue with Sanders catheter in situ for 1 to 2 weeks.  Outpatient follow-up with urology.  -Adequate urine  drainage.       Disposition Plan   Expected discharge in 1 to 2 days.  Anticipate discharge to home with home care. Follow creatinine.      Subjective  Denies any new complaints.  New issues.  No acute events overnight.  Discussed with patient's 2 sons, care plan updated with all questions answered.    Objective    Vital signs in last 24 hours  Temp:  [97.3  F (36.3  C)-98  F (36.7  C)] 97.9  F (36.6  C)  Pulse:  [52-91] 52  Resp:  [16-20] 18  BP: ()/(52-63) 106/54  SpO2:  [92 %-94 %] 92 % @LASTSAO2(12)@ O2 Device: None (Room air)    Weight:   Wt Readings from Last 3 Encounters:   10/23/22 78.6 kg (173 lb 3.2 oz)   04/08/22 81.6 kg (180 lb)   01/26/21 85.7 kg (189 lb)      Weight change: 0.318 kg (11.2 oz)    Intake/Output last 3 shifts  I/O last 3 completed shifts:  In: 580 [P.O.:580]  Out: 650 [Urine:650]  Body mass index is 27.54 kg/m .    Physical Exam    General Appearance:    Alert, cooperative, no distress, appears stated age   Lungs:     Clear bilaterally    Cardiovascular:    Regular rate ands rhythm.  Normal S1, S2.  No murmur, rub or gallop.  No edema   Abdomen:     Soft, non-tender, bowel sounds active all four quadrants,     no masses, no organomegaly   Neurologic:   Awake, alert, oriented x 3.  Grossly nonfocal      Pertinent Labs   Lab Results: personally reviewed.   Recent Labs   Lab 10/23/22  0406 10/22/22  0423 10/21/22  0459 10/18/22  2048 10/18/22  1901   * 138 137   < > 136   CO2 26 27 27   < > 29   BUN 52.3* 42.3* 30.2*   < > 13.4   ALBUMIN  --  3.7  --   --  3.9   ALKPHOS  --   --   --   --  104   ALT  --   --   --   --  13   AST  --   --   --   --  24    < > = values in this interval not displayed.     Recent Labs   Lab 10/23/22  0918 10/22/22  0421 10/21/22  0459   WBC 15.0* 13.4* 13.7*   HGB 13.8 12.6* 13.5   HCT 42.1 38.6* 41.3    282 275     No results for input(s): CKTOTAL, TROPONINI in the last 168 hours.    Invalid input(s): TROPONINT, CKMBINDEX  Invalid input(s):  POCGLUFGR    Medications  Current Facility-Administered Medications   Medication     acetaminophen (TYLENOL) tablet 650 mg    Or     acetaminophen (TYLENOL) Suppository 650 mg     acetylcysteine (MUCOMYST) 20 % nebulizer solution 2 mL     bumetanide (BUMEX) tablet 1 mg     Continuing ACE inhibitor/ARB/ARNI from home medication list OR ACE inhibitor/ARB/ARNI order already placed during this visit     Continuing beta blocker from home medication list OR beta blocker order already placed during this visit     doxazosin (CARDURA) tablet 2 mg     finasteride (PROSCAR) tablet 5 mg     guaiFENesin (MUCINEX) 12 hr tablet 600 mg     hydrALAZINE (APRESOLINE) injection 10 mg     hydrALAZINE (APRESOLINE) tablet 50 mg     ipratropium - albuterol 0.5 mg/2.5 mg/3 mL (DUONEB) neb solution 3 mL     lidocaine (LMX4) cream     lidocaine 1 % 0.1-1 mL     melatonin tablet 1 mg     metoprolol tartrate (LOPRESSOR) half-tab 12.5 mg     ondansetron (ZOFRAN ODT) ODT tab 4 mg    Or     ondansetron (ZOFRAN) injection 4 mg     Patient is already receiving anticoagulation with heparin, enoxaparin (LOVENOX), warfarin (COUMADIN)  or other anticoagulant medication     primidone (MYSOLINE) tablet 50 mg     simvastatin (ZOCOR) tablet 20 mg     sodium chloride (PF) 0.9% PF flush 3 mL     sodium chloride (PF) 0.9% PF flush 3 mL     tamsulosin (FLOMAX) capsule 0.4 mg     Warfarin Dose Required Daily - Pharmacist Managed       Pertinent Radiology   Radiology Results: Personally reviewed   Results for orders placed or performed during the hospital encounter of 10/18/22   XR Chest Port 1 View    Impression    IMPRESSION: Interval development of patchy opacities in the lungs which could represent an infectious or an inflammatory process. No adenopathy or effusion. Mild cardiac enlargement. Normal pulmonary vascularity. Mild elevation of the right   hemidiaphragm. Benign bone island left seventh rib anteriorly. Healed fracture deformities of multiple left  ribs. Degenerative changes both shoulders and the spine. Mildly atherosclerotic thoracic aorta. Monitoring leads have been placed. The patient is   slightly rotated.         Latasha Strong DO  Internal Medicine Hospitalist  10/23/2022

## 2022-10-23 NOTE — PROGRESS NOTES
CARDIOLOGY PROGRESS NOTE      Assessment/Plan:  1.  Acute on chronic diastolic congestive heart failure (H)-acute on chronic in the setting of preserved ejection fraction of 55 to 60%.  Weight down 3 kg, oxygen saturation 94% on room air, symptomatically improved. On oral Bumex now, creatinine down to 1.77.  Holding lisinopril.  Continue to monitor symptoms.  2.  Persistent atrial fibrillation (H)-permanent, not valvular, asymptomatic, on anticoagulation with Coumadin, most recent INR 2.63.  3.  Hypertensive urgency-resolved, currently lisinopril on hold, given age lowered dose of hydralazine slightly.  4.  Acute kidney failure with tubular necrosis (H)-so noted with increased creatinine to 1.94 down to 1.77.  Would hold lisinopril.   5.  BPH- on finasteride.  6.  Aortic stenosis- moderate with peak velocity of 2.6 m/s, mean gradient of 17 mmHg, aortic valve area 1.1 cm .  Recheck echo in 6 months.  7.  Pulmonary hypertension- at least moderate with estimated pressures of 65 mmHg.  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    Plan:  1.  Oral Bumex.  2.  Creatinine management per nephrology.  3.  If stable hopefully to TCU tomorrow.  4.  Cardiac rehab today in anticipation of possible discharge.    Discharge Plannin.  No significant cardiac barrier to discharge, would monitor for 1 more day and consider TCU transfer on Monday.  2.  Can follow with me as primary cardiologist.     LOS: 4 days     Subjective:  Interval History:    89-year-old white gentleman being seen on fifth day of hospitalization, son is present.  No complaints of sinus drainage, shortness of breath, PND and orthopnea.  No significant chest pains, palpitations, syncope, dizziness or peripheral edema.    Medications    acetylcysteine  2 mL Nebulization 4x Daily     [Held by provider] bumetanide  2 mg Oral Daily     doxazosin  2 mg Oral Daily     finasteride  5 mg Oral Daily     guaiFENesin  600 mg Oral BID      hydrALAZINE  50 mg Oral 4x Daily     ipratropium - albuterol 0.5 mg/2.5 mg/3 mL  3 mL Nebulization 4x daily     [Held by provider] lisinopril  40 mg Oral Daily     metoprolol tartrate  12.5 mg Oral BID     primidone  50 mg Oral TID     simvastatin  20 mg Oral At Bedtime     sodium chloride (PF)  3 mL Intracatheter Q8H     tamsulosin  0.4 mg Oral BID     Warfarin Therapy Reminder  1 each Oral See Admin Instructions     Objective:   Vital signs in last 24 hours:  Temp:  [97.3  F (36.3  C)-98  F (36.7  C)] 97.5  F (36.4  C)  Pulse:  [59-91] 63  Resp:  [16-20] 18  BP: ()/(52-63) 108/58  SpO2:  [92 %-94 %] 94 %    Physical Exam:  /58 (BP Location: Right arm)   Pulse 63   Temp 97.5  F (36.4  C) (Oral)   Resp 18   Wt 78.6 kg (173 lb 3.2 oz)   SpO2 94%   BMI 27.54 kg/m      General Appearance:    Alert, cooperative, no distress, appears stated age   Head:    Normocephalic, without obvious abnormality, atraumatic   Throat:   Lips, mucosa, and tongue normal; teeth and gums normal   Neck:   Supple, symmetrical, trachea midline, no adenopathy;        thyroid:  No enlargement/tenderness/nodules; no carotid    bruit or JVD   Back:     Symmetric, no curvature, ROM normal, no CVA tenderness   Lungs:     Clear to auscultation bilaterally, respirations unlabored   Chest wall:    No tenderness or deformity   Heart:    Irregularly irregular, S1 and S2 normal, no murmur, rub   or gallop   Abdomen:     Soft, non-tender, bowel sounds active all four quadrants,     no masses, no organomegaly   Extremities:   Normal, atraumatic, no cyanosis or edema   Pulses:   2+ and symmetric all extremities   Skin:   Skin color, texture, turgor normal, no rashes or lesions     Cardiographics:      ECG: Personally reviewed by myself shows atrial fibrillation, occasional PVC, nonspecific ST-T wave changes.    Echocardiogram:   1. Normal left ventricular size. Mild left ventricular hypertrophy. Left ventricular systolic function is normal.  Left ventricular ejection fraction estimated 55 to 60%.  2. Right ventricle appears mild to moderately dilated. Right ventricular systolic function grossly appears normal.  3. Moderate biatrial enlargement.  4. Moderate calcific aortic stenosis. Peak velocity 2.6 m/s. Mean gradient 17 mmHg. Calculated valve area of approximately 1.1 to 1.4 cmÂ .  5. Pulmonary hypertension. Estimate of RV systolic pressure is elevated at 55 mmHg plus right atrial pressure suggesting at least moderate pulmonary hypertension.  6. Compared to the examination dated December 2015. Moderate aortic stenosis is now suggested. Estimate of RV systolic pressure is higher on the current examination.      Lab Results:   Recent Labs   Lab 10/23/22  0918   WBC 15.0*   HGB 13.8   HCT 42.1        Recent Labs   Lab 10/23/22  0406   *   CO2 26   BUN 52.3*   .       No results found for: CKTOTAL, CKMB, TROPONINI

## 2022-10-24 ENCOUNTER — APPOINTMENT (OUTPATIENT)
Dept: PHYSICAL THERAPY | Facility: HOSPITAL | Age: 87
DRG: 304 | End: 2022-10-24
Attending: INTERNAL MEDICINE
Payer: COMMERCIAL

## 2022-10-24 ENCOUNTER — APPOINTMENT (OUTPATIENT)
Dept: OCCUPATIONAL THERAPY | Facility: HOSPITAL | Age: 87
DRG: 304 | End: 2022-10-24
Attending: INTERNAL MEDICINE
Payer: COMMERCIAL

## 2022-10-24 VITALS
DIASTOLIC BLOOD PRESSURE: 52 MMHG | BODY MASS INDEX: 27.49 KG/M2 | RESPIRATION RATE: 20 BRPM | SYSTOLIC BLOOD PRESSURE: 106 MMHG | OXYGEN SATURATION: 93 % | HEART RATE: 81 BPM | WEIGHT: 172.9 LBS | TEMPERATURE: 97.9 F

## 2022-10-24 LAB
ANION GAP SERPL CALCULATED.3IONS-SCNC: 10 MMOL/L (ref 7–15)
BASOPHILS # BLD AUTO: 0.1 10E3/UL (ref 0–0.2)
BASOPHILS NFR BLD AUTO: 1 %
BUN SERPL-MCNC: 54 MG/DL (ref 8–23)
CALCIUM SERPL-MCNC: 9.1 MG/DL (ref 8.2–9.6)
CHLORIDE SERPL-SCNC: 96 MMOL/L (ref 98–107)
CREAT SERPL-MCNC: 1.39 MG/DL (ref 0.67–1.17)
DEPRECATED HCO3 PLAS-SCNC: 28 MMOL/L (ref 22–29)
EOSINOPHIL # BLD AUTO: 0.1 10E3/UL (ref 0–0.7)
EOSINOPHIL NFR BLD AUTO: 0 %
ERYTHROCYTE [DISTWIDTH] IN BLOOD BY AUTOMATED COUNT: 13.5 % (ref 10–15)
GFR SERPL CREATININE-BSD FRML MDRD: 48 ML/MIN/1.73M2
GLUCOSE SERPL-MCNC: 113 MG/DL (ref 70–99)
HCT VFR BLD AUTO: 41.2 % (ref 40–53)
HGB BLD-MCNC: 13.3 G/DL (ref 13.3–17.7)
HOLD SPECIMEN: NORMAL
IMM GRANULOCYTES # BLD: 0.1 10E3/UL
IMM GRANULOCYTES NFR BLD: 1 %
INR PPP: 2.97 (ref 0.85–1.15)
LYMPHOCYTES # BLD AUTO: 1.1 10E3/UL (ref 0.8–5.3)
LYMPHOCYTES NFR BLD AUTO: 9 %
MCH RBC QN AUTO: 32 PG (ref 26.5–33)
MCHC RBC AUTO-ENTMCNC: 32.3 G/DL (ref 31.5–36.5)
MCV RBC AUTO: 99 FL (ref 78–100)
MONOCYTES # BLD AUTO: 1 10E3/UL (ref 0–1.3)
MONOCYTES NFR BLD AUTO: 9 %
NEUTROPHILS # BLD AUTO: 9.6 10E3/UL (ref 1.6–8.3)
NEUTROPHILS NFR BLD AUTO: 80 %
NRBC # BLD AUTO: 0 10E3/UL
NRBC BLD AUTO-RTO: 0 /100
PLATELET # BLD AUTO: 284 10E3/UL (ref 150–450)
POTASSIUM SERPL-SCNC: 3.9 MMOL/L (ref 3.4–5.3)
POTASSIUM SERPL-SCNC: 4 MMOL/L (ref 3.4–5.3)
RBC # BLD AUTO: 4.16 10E6/UL (ref 4.4–5.9)
SODIUM SERPL-SCNC: 134 MMOL/L (ref 136–145)
WBC # BLD AUTO: 11.8 10E3/UL (ref 4–11)

## 2022-10-24 PROCEDURE — 99239 HOSP IP/OBS DSCHRG MGMT >30: CPT | Performed by: INTERNAL MEDICINE

## 2022-10-24 PROCEDURE — 99232 SBSQ HOSP IP/OBS MODERATE 35: CPT | Performed by: INTERNAL MEDICINE

## 2022-10-24 PROCEDURE — 250N000013 HC RX MED GY IP 250 OP 250 PS 637: Performed by: INTERNAL MEDICINE

## 2022-10-24 PROCEDURE — 97110 THERAPEUTIC EXERCISES: CPT | Mod: GO

## 2022-10-24 PROCEDURE — 85025 COMPLETE CBC W/AUTO DIFF WBC: CPT | Performed by: INTERNAL MEDICINE

## 2022-10-24 PROCEDURE — 85610 PROTHROMBIN TIME: CPT | Performed by: HOSPITALIST

## 2022-10-24 PROCEDURE — 36415 COLL VENOUS BLD VENIPUNCTURE: CPT | Performed by: INTERNAL MEDICINE

## 2022-10-24 PROCEDURE — 97161 PT EVAL LOW COMPLEX 20 MIN: CPT | Mod: GP

## 2022-10-24 PROCEDURE — 97110 THERAPEUTIC EXERCISES: CPT | Mod: GP

## 2022-10-24 PROCEDURE — 97535 SELF CARE MNGMENT TRAINING: CPT | Mod: GO

## 2022-10-24 PROCEDURE — 82310 ASSAY OF CALCIUM: CPT | Performed by: INTERNAL MEDICINE

## 2022-10-24 PROCEDURE — 97116 GAIT TRAINING THERAPY: CPT | Mod: GP

## 2022-10-24 PROCEDURE — 84132 ASSAY OF SERUM POTASSIUM: CPT | Performed by: INTERNAL MEDICINE

## 2022-10-24 PROCEDURE — 250N000013 HC RX MED GY IP 250 OP 250 PS 637: Performed by: HOSPITALIST

## 2022-10-24 RX ORDER — HYDRALAZINE HYDROCHLORIDE 50 MG/1
25 TABLET, FILM COATED ORAL 3 TIMES DAILY
Qty: 90 TABLET | Refills: 0 | Status: SHIPPED | OUTPATIENT
Start: 2022-10-24

## 2022-10-24 RX ORDER — WARFARIN SODIUM 5 MG/1
2.5 TABLET ORAL DAILY
Qty: 30 TABLET | Refills: 0 | Status: SHIPPED | OUTPATIENT
Start: 2022-10-25 | End: 2022-10-24

## 2022-10-24 RX ORDER — WARFARIN SODIUM 5 MG/1
2.5 TABLET ORAL DAILY
Qty: 30 TABLET | Refills: 0 | Status: SHIPPED | OUTPATIENT
Start: 2022-10-25

## 2022-10-24 RX ORDER — BUMETANIDE 1 MG/1
1 TABLET ORAL
Qty: 30 TABLET | Refills: 0 | Status: SHIPPED | OUTPATIENT
Start: 2022-10-24

## 2022-10-24 RX ADMIN — BUMETANIDE 1 MG: 1 TABLET ORAL at 09:05

## 2022-10-24 RX ADMIN — TAMSULOSIN HYDROCHLORIDE 0.4 MG: 0.4 CAPSULE ORAL at 09:05

## 2022-10-24 RX ADMIN — GUAIFENESIN 600 MG: 600 TABLET ORAL at 09:05

## 2022-10-24 RX ADMIN — HYDRALAZINE HYDROCHLORIDE 50 MG: 50 TABLET, FILM COATED ORAL at 09:05

## 2022-10-24 RX ADMIN — PRIMIDONE 50 MG: 50 TABLET ORAL at 15:33

## 2022-10-24 RX ADMIN — DOXAZOSIN 2 MG: 1 TABLET ORAL at 09:05

## 2022-10-24 RX ADMIN — FINASTERIDE 5 MG: 5 TABLET, FILM COATED ORAL at 09:05

## 2022-10-24 RX ADMIN — METOPROLOL TARTRATE 12.5 MG: 25 TABLET, FILM COATED ORAL at 09:05

## 2022-10-24 RX ADMIN — PRIMIDONE 50 MG: 50 TABLET ORAL at 09:05

## 2022-10-24 ASSESSMENT — ACTIVITIES OF DAILY LIVING (ADL)
ADLS_ACUITY_SCORE: 43
FALL_HISTORY_WITHIN_LAST_SIX_MONTHS: NO
ADLS_ACUITY_SCORE: 43
DIFFICULTY_EATING/SWALLOWING: NO
DOING_ERRANDS_INDEPENDENTLY_DIFFICULTY: NO
CHANGE_IN_FUNCTIONAL_STATUS_SINCE_ONSET_OF_CURRENT_ILLNESS/INJURY: NO
VISION_MANAGEMENT: GLASSES
CONCENTRATING,_REMEMBERING_OR_MAKING_DECISIONS_DIFFICULTY: NO
WALKING_OR_CLIMBING_STAIRS_DIFFICULTY: NO
TOILETING_ISSUES: NO
ADLS_ACUITY_SCORE: 43
ADLS_ACUITY_SCORE: 28
DRESSING/BATHING_DIFFICULTY: NO
WEAR_GLASSES_OR_BLIND: YES
ADLS_ACUITY_SCORE: 43
ADLS_ACUITY_SCORE: 43

## 2022-10-24 NOTE — PROGRESS NOTES
10/24/22 1045   Appointment Info   Signing Clinician's Name / Credentials (PT) Alexia Hsu PT   Living Environment   People in Home alone   Current Living Arrangements independent living facility   Home Accessibility no concerns   Transportation Anticipated family or friend will provide   Self-Care   Usual Activity Tolerance moderate   Current Activity Tolerance moderate   Equipment Currently Used at Home cane, straight;walker, rolling;other (see comments)  (4WW)   Activity/Exercise/Self-Care Comment Independent with mobility ,amb with SEC most of the time.Prefers FWW vs 4WW.Has a lift chair at home   General Information   Onset of Illness/Injury or Date of Surgery 10/18/22   Referring Physician Latasha Strong   Patient/Family Therapy Goals Statement (PT) return home   Pertinent History of Current Problem (include personal factors and/or comorbidities that impact the POC) admitted witha-fib   Existing Precautions/Restrictions fall   Weight-Bearing Status - LLE full weight-bearing   Weight-Bearing Status - RLE full weight-bearing   Cognition   Affect/Mental Status (Cognition) WFL   Orientation Status (Cognition) oriented x 4   Pain Assessment   Patient Currently in Pain No   Posture    Posture Forward head position   Range of Motion (ROM)   Range of Motion ROM is WFL   Strength (Manual Muscle Testing)   Strength (Manual Muscle Testing) Deficits observed during functional mobility   Bed Mobility   Supine-Sit [place] (Bed Mobility) supervision   Comment, (Bed Mobility) mod difficulty ,needed extra time   Sit-Stand Transfer   Sit-Stand [place] (Transfers) supervision;verbal cues   Assistive Device (Sit-Stand Transfers) walker, front-wheeled   Comment, (Sit-Stand Transfer) bed slightly elevated ,cues for safety/hands placement   Stand-Sit Transfer   Stand-Sit [place] (Transfers) supervision;verbal cues   Assistive Device (Stand-Sit Transfers) walker, front-wheeled   Comment, (Stand-Sit Transfer) cues  for safety   Gait/Stairs (Locomotion)   [place] Level (Gait) contact guard;verbal cues  (for posture)   Assistive Device (Gait) walker, front-wheeled   Distance in Feet (Required for LE Total Joints) 20   Pattern (Gait) swing-through   Deviations/Abnormal Patterns (Gait) chen decreased;festinating/shuffling;gait speed decreased;stride length decreased   Maintains Weight-bearing Status (Gait) able to maintain   Clinical Impression   Criteria for Skilled Therapeutic Intervention Yes, treatment indicated   PT Diagnosis (PT) impaired functional mobility   Influenced by the following impairments strength,gait .endurance   Functional limitations due to impairments weakness,immobility   Clinical Presentation (PT Evaluation Complexity) Stable/Uncomplicated   Clinical Presentation Rationale pt presents as med diagnosed   Clinical Decision Making (Complexity) low complexity   Planned Therapy Interventions (PT) bed mobility training;gait training;home exercise program;transfer training;strengthening   Anticipated Equipment Needs at Discharge (PT) walker, rolling   Risk & Benefits of therapy have been explained evaluation/treatment results reviewed;participants included   Clinical Impression Comments Patient is a 89 yo male admitted from \[facility]\"" with a-fib.Presents with slight mobility deficicits,appropriate for skilled PT to improve functional strength and mobility for d/c home with assist form family,HHA and home PT   PT Total Evaluation Time   PT Eval, Low Complexity Minutes (99396) 15   Physical Therapy Goals   PT Frequency Daily   PT Predicted Duration/Target Date for Goal Attainment 10/28/22   PT Goals Bed Mobility;Transfers;Gait   PT: Bed Mobility Independent;Supine to/from sit   PT: Transfers Supervision/stand-by assist;Assistive device;Sit to/from stand   PT: Gait Supervision/stand-by assist;Rolling walker;Greater than 200 feet   Therapeutic Procedure/Exercise   Ther. Procedure: strength, endurance, ROM,  flexibillity Minutes (30660) 15   Symptoms Noted During/After Treatment fatigue   Treatment Detail/Skilled Intervention seated han L/E ex x 20 reps .Sit -stand from chair x 10 reps   PT Discharge Planning   PT Plan bed mobility ,transfers ,distance amvb with FWW,ther ex   PT Discharge Recommendation (DC Rec) home with assist;home with home care physical therapy   PT Rationale for DC Rec Moves well.can have family to assist as needed .Recommend A ash home PT   PT Brief overview of current status Patient is CGA for mobility .Amb 120 feet with FWW ,CGA   Total Session Time   Timed Code Treatment Minutes 15   Total Session Time (sum of timed and untimed services) 30

## 2022-10-24 NOTE — DISCHARGE SUMMARY
[facility] MEDICINE  DISCHARGE SUMMARY     Primary Care Physician: Kyree Bojorquez  Admission Date: 10/18/2022   Discharge Provider: Latasha Strong DO Discharge Date: 10/24/2022   Diet:   Active Diet and Nourishment Order   Procedures     Fluid restriction 1500 ML FLUID     Combination Diet Low Saturated Fat Na <2400mg Diet, No Caffeine Diet     Diet       Code Status: Special Code   Activity: DCACTIVITY: Activity as tolerated        Condition at Discharge: Stable     REASON FOR PRESENTATION(See Admission Note for Details)   Elevated blood pressure    PRINCIPAL & ACTIVE DISCHARGE DIAGNOSES     Hypertensive emergency  Acute kidney injury.   HFpEF  Atrial Fibrillation   Supratherapeutic INR  Hyperlipidemia   BPH  Urinary retention          RECOMMENDATIONS TO OUTPATIENT PROVIDER FOR F/U VISIT     Follow-up Appointments     Add follow up information to the AVS prior to printing      Follow-up and recommended labs and tests       Follow up with primary care provider, Kyree Bojorquez, within 7 days for   hospital follow- up.  The following labs/tests are recommended: BMP.    Follow up with  CORE/HF clinic appointment to be arranged.  for hospital   follow- up. No follow up labs or test are needed.  Follow-up with [facility] in 1 to 2 weeks, for TOV.               DISPOSITION     Home with home care    SUMMARY OF HOSPITAL COURSE:      89 year old male resident of an assisted living facility who has a hx of on chronic anticoagulation therapy with warfarin Atrial Fibrillation, benign prostatic hyperplasia and HTN, who is documented as having appropriately becomes flushed with associated dizziness, and when his blood pressure was checked it was noted at 220/110.  Patient stated to have called his primary care provider's office who recommended taking an extra dose of lisinopril, however this did not have any effect and patient advised to come to the ER.  ER record of blood  pressure was noted at 193/86, with patient short of breath vision changes and headache.  He was admitted for acute exacerbation of heart failure in the setting of hypertensive urgency.  Noted with elevated creatinine from baseline patient commenced on active diuresis, with cardiology consult obtained..  Echocardiogram showing EF of 55 to 60%.  Patient has been adequately diuresed, and currently euvolemic.  Heart failure protocol.  Home lisinopril discontinued in the setting of poor renal function.  BP adequately controlled and currently WNL.  Overall hospital course has been progressive and uneventful.  Of note to this patient's complaint of urinary frequency and hesitancy, stating that he is BPH had gotten worse.  Urology consult obtained with recommendation to place a Sanders catheter and for patient to follow-up in the office as outpatient for TOV studies.  Patient was noted on admission with supratherapeutic INR greater than 4 with no evidence of bleeding.  Home warfarin therapy was held with pharmacy consulted to assist with monitor and dosing.  At this time INR is 2.9, and  warfarin restarted at reduced dose to start tomorrow..  Patient voiced understanding the need to follow-up with primary care provider within 1 week to reevaluate the need to further increase dose of warfarin relative INR checks.    Discharge Medications with Med changes:     Current Discharge Medication List      START taking these medications    Details   bumetanide (BUMEX) 1 MG tablet Take 1 tablet (1 mg) by mouth 2 times daily  Qty: 30 tablet, Refills: 0    Associated Diagnoses: Benign essential hypertension; Acute on chronic diastolic congestive heart failure (H)      hydrALAZINE (APRESOLINE) 50 MG tablet Take 0.5 tablets (25 mg) by mouth 3 times daily  Qty: 90 tablet, Refills: 0    Associated Diagnoses: Benign essential hypertension; Acute on chronic diastolic congestive heart failure (H)         CONTINUE these medications which have  CHANGED    Details   warfarin ANTICOAGULANT (COUMADIN) 5 MG tablet Take 0.5 tablets (2.5 mg) by mouth daily 1.5 tabs on MWF and 2 tablets on Abdalla,Tu,Th,Sa  Qty: 30 tablet, Refills: 0    Associated Diagnoses: Persistent atrial fibrillation (H)         CONTINUE these medications which have NOT CHANGED    Details   Cholecalciferol (VITAMIN D3 PO) Take by mouth daily      doxazosin (CARDURA) 4 MG tablet Take 2 mg by mouth daily       finasteride (PROSCAR) 5 MG tablet Take 5 mg by mouth daily      primidone (MYSOLINE) 50 MG tablet Take 50 mg by mouth 3 times daily      simvastatin (ZOCOR) 40 MG tablet Take by mouth At Bedtime      spironolactone (ALDACTONE) 25 MG tablet Take 25 mg by mouth daily      tamsulosin (FLOMAX) 0.4 MG capsule Take 0.4 mg by mouth 2 times daily      metoprolol tartrate (LOPRESSOR) 25 MG tablet Take 0.5 tablets (12.5 mg) by mouth 2 times daily  Qty: 180 tablet, Refills: 3    Associated Diagnoses: Benign essential hypertension         STOP taking these medications       lisinopril (ZESTRIL) 40 MG tablet Comments:   Reason for Stopping:         propranolol (INDERAL) 20 MG tablet Comments:   Reason for Stopping:         torsemide (DEMADEX) 20 MG tablet Comments:   Reason for Stopping:                     Rationale for medication changes:      1.  Lisinopril discontinued due to poor renal function.  To consider restarting as appropriate  2.  Hydralazine started in place of lisinopril, to optimize BP control  3.  Propanolol discontinued per cardiology recommendations.  Patient currently on metoprolol therapy  4.  Warfarin therapy commenced at a lower dose of 2.5 mg daily.  PCP to reevaluate need to increase dose limit if INR check.  Also to consider ACE interaction with primidone.  5.  Torsemide discontinued by cardiology.  Bumex commenced.        Consults     CORE CLINIC EVALUATION IP CONSULT  OCCUPATIONAL THERAPY ADULT IP CONSULT  NUTRITION SERVICES ADULT IP CONSULT  CARE MANAGEMENT / SOCIAL WORK IP  CONSULT  CARDIOLOGY IP CONSULT  PHARMACY TO DOSE MEDICATION  PHARMACY TO DOSE WARFARIN  UROLOGY IP CONSULT  NEPHROLOGY IP CONSULT  CARDIAC REHAB IP CONSULT  PHYSICAL THERAPY ADULT IP CONSULT  CARE MANAGEMENT / SOCIAL WORK IP CONSULT    Immunizations given this encounter     Most Recent Immunizations   Administered Date(s) Administered     COVID-19,PF,Pfizer 12+ YRS BIVALENT Booster 10/13/2022     Flu, Unspecified 09/29/2015     Pneumococcal 23 valent 10/07/1997     Td,adult,historic,unspecified 03/29/2004           Anticoagulation Information      Recent INR results:   Recent Labs   Lab 10/24/22  0426 10/23/22  0406 10/22/22  0423 10/21/22  0459 10/20/22  0448 10/19/22  0719 10/18/22  1901   INR 2.97* 2.63* 2.17* 1.94* 2.99* 3.91* 4.49*     Warfarin doses (if applicable) or name of other anticoagulant: 2.5 mg (refer rationale for medication changes.      SIGNIFICANT IMAGING FINDINGS     Results for orders placed or performed during the hospital encounter of 10/18/22   XR Chest Port 1 View    Impression    IMPRESSION: Interval development of patchy opacities in the lungs which could represent an infectious or an inflammatory process. No adenopathy or effusion. Mild cardiac enlargement. Normal pulmonary vascularity. Mild elevation of the right   hemidiaphragm. Benign bone island left seventh rib anteriorly. Healed fracture deformities of multiple left ribs. Degenerative changes both shoulders and the spine. Mildly atherosclerotic thoracic aorta. Monitoring leads have been placed. The patient is   slightly rotated.   Echocardiogram Complete   Result Value Ref Range    LVEF  55-60%          Discharge Orders        Follow-Up with Cardiology [facility] Heart Failure Discharge      Home Care Referral      Reason for your hospital stay    Hypertensive urgency     Activity    Your activity upon discharge: activity as tolerated     Monitor and record    Weigh yourself every morning     When to contact your care team    Contact your  care team If symptoms get worse, If increased shortness of breath, If waking up at night with difficulty breathing, If unable to lie down for sleep due to symptoms, If weight gain of 2 pounds a day for 2 days in a row OR 5 pounds in 1 week., Increased swelling in your ankles or legs, and Dizziness or lightheadedness     Discharge Follow Up - with Primary Care clinic within 3-5 days (RN to schedule prior to d/c for HE/[facility] primary care     Add follow up information to the AVS prior to printing     Follow-up and recommended labs and tests     Follow up with primary care provider, Kyree Bojorquez, within 7 days for hospital follow- up.  The following labs/tests are recommended: BMP.    Follow up with  CORE/HF clinic appointment to be arranged.  for hospital follow- up. No follow up labs or test are needed.  Follow-up with [facility] in 1 to 2 weeks, for TOV.     Diet    Follow this diet upon discharge: Orders Placed This Encounter      Fluid restriction 1500 ML FLUID      Combination Diet Low Saturated Fat Na <2400mg Diet, No Caffeine Diet       Examination   Physical Exam   Temp:  [97.4  F (36.3  C)-98  F (36.7  C)] 97.9  F (36.6  C)  Pulse:  [72-95] 81  Resp:  [20] 20  BP: ()/(52-62) 106/52  SpO2:  [93 %-95 %] 93 %  Wt Readings from Last 1 Encounters:   10/24/22 78.4 kg (172 lb 14.4 oz)         Please see EMR for more detailed significant labs, imaging, consultant notes etc.    General -awake, alert and oriented x3.  No obvious distress.  HEENT - Perrla, no scleral icterus  Neck -supple.  Soft.  No carotid bruits.  No JVD.  No thyromegaly.  Respiratory - Lungs CTA bilaterally.  No wheeze, rhonchi, or rales   Cardiovascular -(+) S1, S2.  Regular rate an rhythm.  No gallops/murmurs.  Abdomen - soft.  Obese. Non tender.  Non distended.  (+) BS.    Extremities - no LE edema   Integumentary - intact, good turgor, no rash/lesions  Neurologic -CN II to XII WNL.  No focal neurodeficits.  Psych:  Judgement  intact, affect WNL    I spent 48 minutes on discharge documentation, discharge counseling, discharge physical exam, discharge medication review and reconciliation.     Latasha Strong DO  [facility]    CC:Kyree Bojorquez

## 2022-10-24 NOTE — CONSULTS
Care Management Follow Up    Length of Stay (days): 5    Expected Discharge Date: 10/24/2022     Concerns to be Addressed: Discharge Planning    Patient plan of care discussed at interdisciplinary rounds: Yes    Anticipated Discharge Disposition: Home     Anticipated Discharge Services: Home care PT/OT  Anticipated Discharge DME:      Education Provided on the Discharge Plan:  Yes  Patient/Family in Agreement with the Plan:  Yes    Referrals Placed by CM/SW:    Private pay costs discussed: Not applicable    Additional Information:  Pt lives alone in a Independent senior apartment. Building does not have services. Spouse  suddenly in  and  Has all her DMEs at home. 3 of 6 children live local and are supportive. Family to transport at discharge. Patient will discharge home with [facility] PT/OT.      Janie Taylor

## 2022-10-24 NOTE — PROGRESS NOTES
I certify that patient: Carlos Rubio is under my care and that I had a face-to-face encounter that meets the physician face-to-face encounter requirements with this patient on: 10/24/2022     This encounter with the patient was in whole, or in part, for the following medical condition, which is the primary reason for home health care:  Congestive heart failure     I certify that, based on my findings, the following services are medically necessary home health services: Nursing, Occupational Therapy and Physical Therapy.     My clinical findings support the need for the above services because: Nurse is needed: To assess progress after changes in medications or other medical regimen. and to teach and train about the disease and treatments for Improvement in illness, because patient risks poor compliance. Occupational Therapy Services are needed to assess , treat and address ADL  for safety. Physical Therapy Services are needed to assess and treat the following functional impairments: Gait instability.     Further, I certify that my clinical findings support that this patient is mostly homebound (i.e. absences from home require considerable and taxing effort and are for medical reasons or Congregation services or infrequently or of short duration when for other reasons) because: patient requires assistance of another person or specialized equipment to access services due to prohibitive pain during ambulation. Patient also requires supervision of another for safe transfer.     Based on the above findings. I certify that this patient needs intermittent skilled nursing care, physical therapy and/or occupational therapy.  The patient has been under my care, and I have initiated the establishment of the plan of care.  This patient will be followed by a physician who will periodically review the plan of care.  Physician/Provider to provide follow up care: Kyree Bojorquez DO     Attending hospital physician (the Medicare  certified [facility] provider): Latasha Strong DO  Physician Signature: See electronic signature associated with these discharge orders.  Date: 10/24/2022

## 2022-10-24 NOTE — DISCHARGE INSTRUCTIONS
Home care services have been arranged for the patient.  Home Care Agency: [facility]  Home Care Phone Number: [phone]   Services: Physical Therapy and Occupational Therapy  Instructions: Home care agency will call within 48 hours of discharge to schedule initial assessment.

## 2022-10-24 NOTE — PROGRESS NOTES
HEART CARE NOTE          Assessment/Recommendations     1. HFpEF c/b ADHF  Assessment / Plan    Near euvolemia and tolerating oral diuretic regimen - no changes a this time    GDMT as detailed below; mainstay of treatment for HFpEF includes diuretics and adequate BP control (class I) and SGLT2-I (class 2a); additional medical therapy (ARNI, MRA, ARB) demonstrated less robust evidence for indication but may be considered per guideline recommendations (2b); no indication for BBlockers      Current Pharmacotherapy AHA Guideline-Directed Medical Therapy   Lisinopril 40 mg daily - held ARNI/ARB   Spironolactone not started  MRA   SGLT2 inhibitor not started SGLT2-I    Bumex 2 mg BID Loop diuretic       2. Hypertensive urgency  Assessment / Plan    Inadequately controlled - likely a large component of cardiogenic pulmonary edema and acute cardiac decompensations;continue to titrate oral afterload regimen as tolerated      3. Chronic atrial fibrillation  Assessment / Plan    Continue metoprolol    Warfarin per pharmacy management    Ok for discharge from a cardiology standpoint. Cardiology team will sign-off for now. Please do not hesitate to consult us again if new questions or concerns arise. Follow-up appointment will be arranged by CORE/HF clinic.       History of Present Illness/Subjective      Mr. Carlos Rubio is a 89 year old male with a PMHx significant for (per Dr. Ross's note) HTN,Atrial Fibrillation,HTN.  As reported in Epic , he was at a social in his assisted living facility and became flushed and slightly dizzy.  He checked his Bp and was 220/110. He called his PCP office who recommended taking an extra dose of his lisinopril but had no effect so he was advised to come to ED     Today, Mr. Rubio denies any acute cardiac events or complaints; Management plan as detailed above        ECG: Personally reviewed. normal sinus rhythm, occasional PVC noted, unifocal.     ECHO 12/11/15 (personnaly Reviewed):  1.  Normal left ventricular size. Mild left ventricular hypertrophy. Left  ventricular systolic function is normal. Left ventricular ejection fraction  estimated 55 to 60%.  2. Right ventricle appears mild to moderately dilated. Right ventricular  systolic function grossly appears normal.  3. Moderate biatrial enlargement.  4. Moderate calcific aortic stenosis. Peak velocity 2.6 m/s. Mean gradient 17  mmHg. Calculated valve area of approximately 1.1 to 1.4 cmÂ .  5. Pulmonary hypertension. Estimate of RV systolic pressure is elevated at 55  mmHg plus right atrial pressure suggesting at least moderate pulmonary  hypertension.  6. Compared to the examination dated December 2015. Moderate aortic stenosis  is now suggested. Estimate of RV systolic pressure is higher on the current  examination.  ______________________________________________________________________________            Physical Examination Review of Systems   /62 (BP Location: Left arm)   Pulse 72   Temp 98  F (36.7  C) (Oral)   Resp 20   Wt 78.4 kg (172 lb 14.4 oz)   SpO2 94%   BMI 27.49 kg/m    Body mass index is 27.49 kg/m .  Wt Readings from Last 3 Encounters:   10/24/22 78.4 kg (172 lb 14.4 oz)   04/08/22 81.6 kg (180 lb)   01/26/21 85.7 kg (189 lb)     General Appearance:   no distress, normal body habitus   ENT/Mouth: membranes moist, no oral lesions or bleeding gums.      EYES:  no scleral icterus, normal conjunctivae   Neck: no carotid bruits or thyromegaly   Chest/Lungs:   lungs are clear to auscultation, no rales or wheezing, equal chest wall expansion    Cardiovascular:   Regular. Normal first and second heart sounds with no murmurs, rubs, or gallops; the carotid, radial and posterior tibial pulses are intact, no JVD or LE edema bilaterally    Abdomen:  no organomegaly, masses, bruits, or tenderness; bowel sounds are present   Extremities: no cyanosis or clubbing   Skin: no xanthelasma, warm.    Neurologic: alert and oriented x3,  calm     Psychiatric: alert and oriented x3, calm     A complete 10 systems ROS was reviewed  And is negative except what is listed in the HPI.          Medical History  Surgical History Family History Social History   Past Medical History:   Diagnosis Date     A-fib (H)     on coumadin     ARF (acute renal failure) (H)     States both his kidneys quit. Was going to start dialysis when they started working again.     Arthritis      Basal cell carcinoma      Carpal tunnel syndrome      Diabetes (H)     MUSA SAID HE DOES NOT HAVE DIABETES 12/11/15.     Heart failure (H)      HTN (hypertension)      Psoriasis      Psoriatic arthritis (H)      Sicca syndrome (H)     MUSA SAID HE DOES NOT HAVE/NEVER HAD SICCA SYNDROME 12/11/15.    Past Surgical History:   Procedure Laterality Date     ARTHROSCOPY KNEE       IR MISCELLANEOUS PROCEDURE  10/27/2008     IR MISCELLANEOUS PROCEDURE  10/27/2008     RELEASE CARPAL TUNNEL Left     no family history of premature coronary artery disease Social History     Socioeconomic History     Marital status:      Spouse name: Not on file     Number of children: Not on file     Years of education: Not on file     Highest education level: Not on file   Occupational History     Not on file   Tobacco Use     Smoking status: Former     Packs/day: 0.50     Years: 20.00     Pack years: 10.00     Types: Cigarettes     Quit date: 1975     Years since quittin.8     Smokeless tobacco: Never     Tobacco comments:     Smoked a pipe from  to about  (or maybe  - he can't remember)   Substance and Sexual Activity     Alcohol use: Yes     Alcohol/week: 1.0 standard drink     Comment: Alcoholic Drinks/day: Occasionally     Drug use: No     Sexual activity: Not on file   Other Topics Concern     Parent/sibling w/ CABG, MI or angioplasty before 65F 55M? Not Asked   Social History Narrative     Not on file     Social Determinants of Health     Financial Resource Strain: Not on  file   Food Insecurity: Not on file   Transportation Needs: Not on file   Physical Activity: Not on file   Stress: Not on file   Social Connections: Not on file   Intimate Partner Violence: Not on file   Housing Stability: Not on file           Lab Results    Chemistry/lipid CBC Cardiac Enzymes/BNP/TSH/INR   Lab Results   Component Value Date    CHOL 147 10/06/2022    HDL 47 10/06/2022    TRIG 120 10/06/2022    BUN 52.3 (H) 10/23/2022     (L) 10/23/2022    CO2 26 10/23/2022    Lab Results   Component Value Date    WBC 15.0 (H) 10/23/2022    HGB 13.8 10/23/2022    HCT 42.1 10/23/2022    MCV 99 10/23/2022     10/23/2022    Lab Results   Component Value Date    TSH 2.23 10/18/2022    INR 2.63 (H) 10/23/2022     No results found for: CKTOTAL, CKMB, TROPONINI       Weight:    Wt Readings from Last 3 Encounters:   10/24/22 78.4 kg (172 lb 14.4 oz)   04/08/22 81.6 kg (180 lb)   01/26/21 85.7 kg (189 lb)       Allergies  Allergies   Allergen Reactions     Bactrim [Sulfamethoxazole W/Trimethoprim]          Surgical History  Past Surgical History:   Procedure Laterality Date     ARTHROSCOPY KNEE       IR MISCELLANEOUS PROCEDURE  10/27/2008     IR MISCELLANEOUS PROCEDURE  10/27/2008     RELEASE CARPAL TUNNEL Left        Social History  Tobacco:   History   Smoking Status     Former     Packs/day: 0.50     Years: 20.00     Quit date: 1/1/1975   Smokeless Tobacco     Never     Comment: Smoked a pipe from 1951 to about 1975 (or maybe 1973 - he can't remember)    Alcohol:   Social History    Substance and Sexual Activity      Alcohol use: Yes        Alcohol/week: 1.0 standard drink        Comment: Alcoholic Drinks/day: Occasionally   Illicit Drugs:   History   Drug Use No       Family History  Family History   Problem Relation Age of Onset     Cerebrovascular Disease Mother 76.00     Cerebrovascular Disease Father 81.00     Pacemaker Brother 82.00          Zurdo Nelson MD on 10/24/2022      cc: Kyree Bojorquez  H

## 2022-10-25 ENCOUNTER — TELEPHONE (OUTPATIENT)
Dept: CARDIOLOGY | Facility: CLINIC | Age: 87
End: 2022-10-25

## 2022-10-25 ENCOUNTER — PATIENT OUTREACH (OUTPATIENT)
Dept: CARE COORDINATION | Facility: CLINIC | Age: 87
End: 2022-10-25

## 2022-10-25 DIAGNOSIS — I50.33 ACUTE ON CHRONIC DIASTOLIC CONGESTIVE HEART FAILURE (H): Primary | ICD-10-CM

## 2022-10-25 NOTE — PLAN OF CARE
Goal Outcome Evaluation:      Plan of Care Reviewed With: patient               Pt has been alert and oriented x4 and able to make needs known.  Pt was able to ambulate in halls today.  Tolerating diet with no complaints of nausea or vomiting.  Education given on discharging with a best catheter.  Pt practiced opening and closing until he was comfortable with the clamp.  Orders to discharge patient.  Discharge instructions given with verbalizing understanding.

## 2022-10-25 NOTE — TELEPHONE ENCOUNTER
HRS patient home monitoring kit is ordered as per Dr. Nelson.  Jeny BISHOP RN BSN, CHFN, PCCN-K

## 2022-10-25 NOTE — PLAN OF CARE
Physical Therapy Discharge Summary    Reason for therapy discharge:    Discharged to home.    Progress towards therapy goal(s). See goals on Care Plan in [facility] electronic health record for goal details.  Goals not met.  Barriers to achieving goals:   discharge from facility.    Therapy recommendation(s):    Continued therapy is recommended.  Rationale/Recommendations:  to improve mobility and strength. .    Goal Outcome Evaluation:

## 2022-10-25 NOTE — PLAN OF CARE
Occupational Therapy Discharge Summary    Reason for therapy discharge:    Discharged to home with home therapy.    Progress towards therapy goal(s). See goals on Care Plan in [facility] electronic health record for goal details.  Goals partially met.  Barriers to achieving goals:   discharge from facility.    David SMALLWOOD 10/25/2022

## 2022-10-25 NOTE — PROGRESS NOTES
Clinic Care Coordination Contact  Care Team Conversations    Patient identified for care management outreach, however [facility] RN staff has already followed up with patient to ensure they are following up with PCP and have needs and resources met. Clinic RN will refer back to JERROD CRAIG if needed/appropriate.    Rukhsana Mandel, [facility]  Social Work Care Coordinator - [facility]  Care Coordination  [email or web address]  Cell Phone: [phone]  Gender pronouns: she/her  Employed by [employer]

## 2022-10-27 ENCOUNTER — LAB REQUISITION (OUTPATIENT)
Dept: LAB | Facility: CLINIC | Age: 87
End: 2022-10-27

## 2022-10-27 ENCOUNTER — TRANSFERRED RECORDS (OUTPATIENT)
Dept: HEALTH INFORMATION MANAGEMENT | Facility: CLINIC | Age: 87
End: 2022-10-27

## 2022-10-27 PROCEDURE — 80048 BASIC METABOLIC PNL TOTAL CA: CPT | Performed by: FAMILY MEDICINE

## 2022-10-28 LAB
ANION GAP SERPL CALCULATED.3IONS-SCNC: 13 MMOL/L (ref 7–15)
BUN SERPL-MCNC: 63 MG/DL (ref 8–23)
CALCIUM SERPL-MCNC: 9.5 MG/DL (ref 8.2–9.6)
CHLORIDE SERPL-SCNC: 98 MMOL/L (ref 98–107)
CREAT SERPL-MCNC: 1.33 MG/DL (ref 0.67–1.17)
DEPRECATED HCO3 PLAS-SCNC: 27 MMOL/L (ref 22–29)
GFR SERPL CREATININE-BSD FRML MDRD: 50 ML/MIN/1.73M2
GLUCOSE SERPL-MCNC: 87 MG/DL (ref 70–99)
POTASSIUM SERPL-SCNC: 3.7 MMOL/L (ref 3.4–5.3)
SODIUM SERPL-SCNC: 138 MMOL/L (ref 136–145)

## 2022-11-01 ENCOUNTER — OFFICE VISIT (OUTPATIENT)
Dept: CARDIOLOGY | Facility: CLINIC | Age: 87
End: 2022-11-01
Attending: INTERNAL MEDICINE
Payer: COMMERCIAL

## 2022-11-01 VITALS
DIASTOLIC BLOOD PRESSURE: 50 MMHG | RESPIRATION RATE: 16 BRPM | WEIGHT: 177.8 LBS | OXYGEN SATURATION: 96 % | SYSTOLIC BLOOD PRESSURE: 120 MMHG | BODY MASS INDEX: 28.27 KG/M2 | HEART RATE: 68 BPM

## 2022-11-01 DIAGNOSIS — I50.33 ACUTE ON CHRONIC DIASTOLIC CONGESTIVE HEART FAILURE (H): Primary | ICD-10-CM

## 2022-11-01 DIAGNOSIS — I10 BENIGN ESSENTIAL HYPERTENSION: ICD-10-CM

## 2022-11-01 DIAGNOSIS — I48.19 PERSISTENT ATRIAL FIBRILLATION (H): ICD-10-CM

## 2022-11-01 PROBLEM — I50.23 ACUTE ON CHRONIC SYSTOLIC CONGESTIVE HEART FAILURE (H): Status: RESOLVED | Noted: 2022-10-19 | Resolved: 2022-11-01

## 2022-11-01 PROCEDURE — 99214 OFFICE O/P EST MOD 30 MIN: CPT | Performed by: NURSE PRACTITIONER

## 2022-11-01 NOTE — PROGRESS NOTES
Assessment/Recommendations   Assessment:    1.  Heart failure with preserved ejection fraction, NYHA class II: Compensated.  He states his dyspnea on exertion and lower extremity edema have improved.  His weight has been stable.  He was enrolled in [employer] but has not received his system yet.  He also enrolled in [employer] service which he has not started.  He lives alone.  His family members do help him quite a bit.  2.  Hypertension: Controlled. Blood pressure 120/50  3.  Persistent atrial fibrillation: Rate controlled. He continues warfarin for anticoagulation.  He is on Lopressor for rate control    Plan:  1.  Continue current medications  2. Continue monitoring daily weight and following a low salt diet, enrolled in [employer] meals in the hospital  3. Encourage regular activity    Carlos Rubio will follow up with Dr. Sewell in 6 to 8 weeks     History of Present Illness/Subjective    Mr. Carlos Rubio is a 89 year old male seen at [facility] heart failure clinic today for continued follow-up.  He follows up for heart failure with preserved ejection fraction. His son accompanies him today. He was hospitalized October 18 to October 24 with hypertensive urgency and acute heart failure.  He was diuresed and symptoms improved.  Lisinopril was held due to acute kidney injury.  He was started on Bumex and hydralazine which improved his blood pressure.  He had an echocardiogram which showed ejection fraction 55 to 60% with moderate aortic stenosis and moderate pulmonary hypertension.  Dr. Sewell recommended rechecking echocardiogram in about 6 months.  He has a past medical history significant for hypertension and persistent atrial fibrillation.    Today, he states his dyspnea on exertion and lower extremity edema have improved since hospitalization.  He denies lightheadedness, orthopnea, PND, palpitations, chest pain, abdominal fullness/bloating and lower extremity edema.      He is monitoring home  weights which are stable between 170-172 pounds.  He is following a low sodium diet.     ECHOCARDIOGRAM: 10/19/2022  Interpretation Summary     1. Normal left ventricular size. Mild left ventricular hypertrophy. Left  ventricular systolic function is normal. Left ventricular ejection fraction  estimated 55 to 60%.  2. Right ventricle appears mild to moderately dilated. Right ventricular  systolic function grossly appears normal.  3. Moderate biatrial enlargement.  4. Moderate calcific aortic stenosis. Peak velocity 2.6 m/s. Mean gradient 17  mmHg. Calculated valve area of approximately 1.1 to 1.4 cmÂ .  5. Pulmonary hypertension. Estimate of RV systolic pressure is elevated at 55  mmHg plus right atrial pressure suggesting at least moderate pulmonary  hypertension.  6. Compared to the examination dated December 2015. Moderate aortic stenosis  is now suggested. Estimate of RV systolic pressure is higher on the current  examination.     Physical Examination Review of Systems   /50 (BP Location: Right arm, Patient Position: Sitting, Cuff Size: Adult Regular)   Pulse 68   Resp 16   Wt 80.6 kg (177 lb 12.8 oz)   SpO2 96%   BMI 28.27 kg/m    Body mass index is 28.27 kg/m .  Wt Readings from Last 3 Encounters:   11/01/22 80.6 kg (177 lb 12.8 oz)   10/24/22 78.4 kg (172 lb 14.4 oz)   04/08/22 81.6 kg (180 lb)       General Appearance:   no acute distress   ENT/Mouth: Wearing mask   EYES:  no scleral icterus, normal conjunctivae   Neck: no thyromegaly   Chest/Lungs:   lungs are clear to auscultation, no rales or wheezing, equal chest wall expansion    Cardiovascular:    Irregularly irregular. Normal first and second heart sounds with no murmurs, rubs, or gallops, trace edema bilaterally    Abdomen:  bowel sounds are present   Extremities: no cyanosis or clubbing   Skin: no xanthelasma, warm.    Neurologic: normal  bilateral, no tremors     Psychiatric: alert and oriented x3                                               Medical History  Surgical History Family History Social History   Past Medical History:   Diagnosis Date     A-fib (H)     on coumadin     ARF (acute renal failure) (H) 1979    States both his kidneys quit. Was going to start dialysis when they started working again.     Arthritis      Atrial fibrillation (H)      Basal cell carcinoma      Carpal tunnel syndrome      Congestive heart failure (H)      Diabetes (H)     MUSA SAID HE DOES NOT HAVE DIABETES 12/11/15.     Heart failure (H)      HTN (hypertension)      Psoriasis      Psoriatic arthritis (H)      Sicca syndrome (H)     MUSA SAID HE DOES NOT HAVE/NEVER HAD SICCA SYNDROME 12/11/15.    Past Surgical History:   Procedure Laterality Date     ARTHROSCOPY KNEE       IR MISCELLANEOUS PROCEDURE  10/27/2008     IR MISCELLANEOUS PROCEDURE  10/27/2008     RELEASE CARPAL TUNNEL Left     Family History   Problem Relation Age of Onset     Cerebrovascular Disease Mother 76.00     Cerebrovascular Disease Father 81.00     Pacemaker Brother 82.00    Social History     Socioeconomic History     Marital status:      Spouse name: Not on file     Number of children: Not on file     Years of education: Not on file     Highest education level: Not on file   Occupational History     Not on file   Tobacco Use     Smoking status: Former     Packs/day: 0.50     Years: 20.00     Pack years: 10.00     Types: Cigarettes     Quit date: 1975     Years since quittin.8     Smokeless tobacco: Never     Tobacco comments:     Smoked a pipe from  to about  (or maybe  - he can't remember)   Substance and Sexual Activity     Alcohol use: Yes     Alcohol/week: 1.0 standard drink     Comment: Alcoholic Drinks/day: Occasionally     Drug use: No     Sexual activity: Not on file   Other Topics Concern     Parent/sibling w/ CABG, MI or angioplasty before 65F 55M? Not Asked   Social History Narrative     Not on file     Social Determinants of Health      Financial Resource Strain: Not on file   Food Insecurity: Not on file   Transportation Needs: Not on file   Physical Activity: Not on file   Stress: Not on file   Social Connections: Not on file   Intimate Partner Violence: Not on file   Housing Stability: Not on file          Medications  Allergies   Current Outpatient Medications   Medication Sig Dispense Refill     bumetanide (BUMEX) 1 MG tablet Take 1 tablet (1 mg) by mouth 2 times daily 30 tablet 0     Cholecalciferol (VITAMIN D3 PO) Take by mouth daily       doxazosin (CARDURA) 4 MG tablet Take 2 mg by mouth daily        finasteride (PROSCAR) 5 MG tablet Take 5 mg by mouth daily       hydrALAZINE (APRESOLINE) 50 MG tablet Take 0.5 tablets (25 mg) by mouth 3 times daily 90 tablet 0     metoprolol tartrate (LOPRESSOR) 25 MG tablet Take 0.5 tablets (12.5 mg) by mouth 2 times daily 180 tablet 3     primidone (MYSOLINE) 50 MG tablet Take 50 mg by mouth 3 times daily       simvastatin (ZOCOR) 40 MG tablet Take by mouth At Bedtime       spironolactone (ALDACTONE) 25 MG tablet Take 25 mg by mouth daily       tamsulosin (FLOMAX) 0.4 MG capsule Take 0.4 mg by mouth 2 times daily       warfarin ANTICOAGULANT (COUMADIN) 5 MG tablet Take 0.5 tablets (2.5 mg) by mouth daily 30 tablet 0    Allergies   Allergen Reactions     Bactrim [Sulfamethoxazole W/Trimethoprim]          Lab Results    Chemistry/lipid CBC Cardiac Enzymes/BNP/TSH/INR   Lab Results   Component Value Date    CHOL 147 10/06/2022    HDL 47 10/06/2022    TRIG 120 10/06/2022    BUN 63.0 (H) 10/27/2022     10/27/2022    CO2 27 10/27/2022    Lab Results   Component Value Date    WBC 11.8 (H) 10/24/2022    HGB 13.3 10/24/2022    HCT 41.2 10/24/2022    MCV 99 10/24/2022     10/24/2022    Lab Results   Component Value Date    TSH 2.23 10/18/2022    INR 2.97 (H) 10/24/2022             This note has been dictated using voice recognition software. Any grammatical, typographical, or context distortions  are unintentional and inherent to the software    30 minutes spent on the date of encounter doing chart review, review of outside records, review of test results, interpretation with above tests, patient visit, documentation and discussion with family.

## 2022-11-01 NOTE — LETTER
11/1/2022    Kyree Bojorquez MD  [address]  [facility] [address]    RE: Carlos Rubio       Dear Colleague,     I had the pleasure of seeing Carlos Rubio in the [facility].        Assessment/Recommendations   Assessment:    1.  Heart failure with preserved ejection fraction, NYHA class II: Compensated.  He states his dyspnea on exertion and lower extremity edema have improved.  His weight has been stable.  He was enrolled in [employer] but has not received his system yet.  He also enrolled in [employer] service which he has not started.  He lives alone.  His family members do help him quite a bit.  2.  Hypertension: Controlled. Blood pressure 120/50  3.  Persistent atrial fibrillation: Rate controlled. He continues warfarin for anticoagulation.  He is on Lopressor for rate control    Plan:  1.  Continue current medications  2. Continue monitoring daily weight and following a low salt diet, enrolled in [employer] meals in the hospital  3. Encourage regular activity    Carlos Rubio will follow up with Dr. Sewell in 6 to 8 weeks     History of Present Illness/Subjective    Mr. Carlos Rubio is a 89 year old male seen at [facility] heart failure clinic today for continued follow-up.  He follows up for heart failure with preserved ejection fraction. His son accompanies him today. He was hospitalized October 18 to October 24 with hypertensive urgency and acute heart failure.  He was diuresed and symptoms improved.  Lisinopril was held due to acute kidney injury.  He was started on Bumex and hydralazine which improved his blood pressure.  He had an echocardiogram which showed ejection fraction 55 to 60% with moderate aortic stenosis and moderate pulmonary hypertension.  Dr. Sewell recommended rechecking echocardiogram in about 6 months.  He has a past medical history significant for hypertension and persistent atrial fibrillation.    Today, he states his dyspnea on exertion and lower  extremity edema have improved since hospitalization.  He denies lightheadedness, orthopnea, PND, palpitations, chest pain, abdominal fullness/bloating and lower extremity edema.      He is monitoring home weights which are stable between 170-172 pounds.  He is following a low sodium diet.     ECHOCARDIOGRAM: 10/19/2022  Interpretation Summary     1. Normal left ventricular size. Mild left ventricular hypertrophy. Left  ventricular systolic function is normal. Left ventricular ejection fraction  estimated 55 to 60%.  2. Right ventricle appears mild to moderately dilated. Right ventricular  systolic function grossly appears normal.  3. Moderate biatrial enlargement.  4. Moderate calcific aortic stenosis. Peak velocity 2.6 m/s. Mean gradient 17  mmHg. Calculated valve area of approximately 1.1 to 1.4 cmÂ .  5. Pulmonary hypertension. Estimate of RV systolic pressure is elevated at 55  mmHg plus right atrial pressure suggesting at least moderate pulmonary  hypertension.  6. Compared to the examination dated December 2015. Moderate aortic stenosis  is now suggested. Estimate of RV systolic pressure is higher on the current  examination.     Physical Examination Review of Systems   /50 (BP Location: Right arm, Patient Position: Sitting, Cuff Size: Adult Regular)   Pulse 68   Resp 16   Wt 80.6 kg (177 lb 12.8 oz)   SpO2 96%   BMI 28.27 kg/m    Body mass index is 28.27 kg/m .  Wt Readings from Last 3 Encounters:   11/01/22 80.6 kg (177 lb 12.8 oz)   10/24/22 78.4 kg (172 lb 14.4 oz)   04/08/22 81.6 kg (180 lb)       General Appearance:   no acute distress   ENT/Mouth: Wearing mask   EYES:  no scleral icterus, normal conjunctivae   Neck: no thyromegaly   Chest/Lungs:   lungs are clear to auscultation, no rales or wheezing, equal chest wall expansion    Cardiovascular:    Irregularly irregular. Normal first and second heart sounds with no murmurs, rubs, or gallops, trace edema bilaterally    Abdomen:  bowel sounds  are present   Extremities: no cyanosis or clubbing   Skin: no xanthelasma, warm.    Neurologic: normal  bilateral, no tremors     Psychiatric: alert and oriented x3                                              Medical History  Surgical History Family History Social History   Past Medical History:   Diagnosis Date     A-fib (H)     on coumadin     ARF (acute renal failure) (H) 1979    States both his kidneys quit. Was going to start dialysis when they started working again.     Arthritis      Atrial fibrillation (H)      Basal cell carcinoma      Carpal tunnel syndrome      Congestive heart failure (H)      Diabetes (H)     MUSA SAID HE DOES NOT HAVE DIABETES 12/11/15.     Heart failure (H)      HTN (hypertension)      Psoriasis      Psoriatic arthritis (H)      Sicca syndrome (H)     MUSA SAID HE DOES NOT HAVE/NEVER HAD SICCA SYNDROME 12/11/15.    Past Surgical History:   Procedure Laterality Date     ARTHROSCOPY KNEE       IR MISCELLANEOUS PROCEDURE  10/27/2008     IR MISCELLANEOUS PROCEDURE  10/27/2008     RELEASE CARPAL TUNNEL Left     Family History   Problem Relation Age of Onset     Cerebrovascular Disease Mother 76.00     Cerebrovascular Disease Father 81.00     Pacemaker Brother 82.00    Social History     Socioeconomic History     Marital status:      Spouse name: Not on file     Number of children: Not on file     Years of education: Not on file     Highest education level: Not on file   Occupational History     Not on file   Tobacco Use     Smoking status: Former     Packs/day: 0.50     Years: 20.00     Pack years: 10.00     Types: Cigarettes     Quit date: 1975     Years since quittin.8     Smokeless tobacco: Never     Tobacco comments:     Smoked a pipe from  to about  (or maybe  - he can't remember)   Substance and Sexual Activity     Alcohol use: Yes     Alcohol/week: 1.0 standard drink     Comment: Alcoholic Drinks/day: Occasionally     Drug use: No      Sexual activity: Not on file   Other Topics Concern     Parent/sibling w/ CABG, MI or angioplasty before 65F 55M? Not Asked   Social History Narrative     Not on file     Social Determinants of Health     Financial Resource Strain: Not on file   Food Insecurity: Not on file   Transportation Needs: Not on file   Physical Activity: Not on file   Stress: Not on file   Social Connections: Not on file   Intimate Partner Violence: Not on file   Housing Stability: Not on file          Medications  Allergies   Current Outpatient Medications   Medication Sig Dispense Refill     bumetanide (BUMEX) 1 MG tablet Take 1 tablet (1 mg) by mouth 2 times daily 30 tablet 0     Cholecalciferol (VITAMIN D3 PO) Take by mouth daily       doxazosin (CARDURA) 4 MG tablet Take 2 mg by mouth daily        finasteride (PROSCAR) 5 MG tablet Take 5 mg by mouth daily       hydrALAZINE (APRESOLINE) 50 MG tablet Take 0.5 tablets (25 mg) by mouth 3 times daily 90 tablet 0     metoprolol tartrate (LOPRESSOR) 25 MG tablet Take 0.5 tablets (12.5 mg) by mouth 2 times daily 180 tablet 3     primidone (MYSOLINE) 50 MG tablet Take 50 mg by mouth 3 times daily       simvastatin (ZOCOR) 40 MG tablet Take by mouth At Bedtime       spironolactone (ALDACTONE) 25 MG tablet Take 25 mg by mouth daily       tamsulosin (FLOMAX) 0.4 MG capsule Take 0.4 mg by mouth 2 times daily       warfarin ANTICOAGULANT (COUMADIN) 5 MG tablet Take 0.5 tablets (2.5 mg) by mouth daily 30 tablet 0    Allergies   Allergen Reactions     Bactrim [Sulfamethoxazole W/Trimethoprim]          Lab Results    Chemistry/lipid CBC Cardiac Enzymes/BNP/TSH/INR   Lab Results   Component Value Date    CHOL 147 10/06/2022    HDL 47 10/06/2022    TRIG 120 10/06/2022    BUN 63.0 (H) 10/27/2022     10/27/2022    CO2 27 10/27/2022    Lab Results   Component Value Date    WBC 11.8 (H) 10/24/2022    HGB 13.3 10/24/2022    HCT 41.2 10/24/2022    MCV 99 10/24/2022     10/24/2022    Lab Results    Component Value Date    TSH 2.23 10/18/2022    INR 2.97 (H) 10/24/2022             This note has been dictated using voice recognition software. Any grammatical, typographical, or context distortions are unintentional and inherent to the software    30 minutes spent on the date of encounter doing chart review, review of outside records, review of test results, interpretation with above tests, patient visit, documentation and discussion with family.                  Thank you for allowing me to participate in the care of your patient.      Sincerely,     SAMREEN Cota [facility]  cc:   Zurdo Nelson MD  [address]  [place] [address]

## 2022-11-01 NOTE — PATIENT INSTRUCTIONS
Carlos Rubio,    It was a pleasure to see you today at [facility].     My recommendations after this visit include:  - Please follow up with Dr Sewell in 6-8 weeks   - Continue current medications    Nuria Chow CNP      What is the [facility] Failure [facility]?     The [facility] Failure [facility] is a heart failure specialty clinic within [facility] Care.  You will work with your cardiologist, nurse practitioner, and nurses to carefully adjust medications and learn how to live with heart failure.  The Heart Failure Program will help you:    Better understand your chronic heart condition  Feel better and avoid hospital stays    Monitoring for Symptoms      Call the Heart Failure Phone Line ([phone]) if you have any of these symptoms:   Increased shortness of breath/shortness of breath at rest  Waking up at night with difficulty breathing  Unable to lie down for sleep due to symptoms or needing to sit upright for sleep  Weight gain of 2 pounds a day for 2 days in a row OR 5 pounds in 1 week  Increased swelling in your ankles or legs  Dizziness or lightheadedness    Medications     Take your medications as prescribed  Bring all your medications in their original bottles to every appointment  Avoid non-steroidal anti-inflammatory medications (Advil, Aleve, Ibuprofen, Naprosyn, Naproxen, Celebrex)  Do not stop taking your medications or begin taking over-the-counter or herbal medications without first talking to your doctor or nurse practitioner    Diet and Lifestyle     Limit sodium/salt to 2000 mg daily   Read food labels for sodium content  Do not add salt when cooking or add salt at the table  Weigh yourself every day and record in your daily weight log   Call if you gain 2 pounds a day for 2 days in a row OR 5 pounds in 1 week  Bring daily weight log to every appointment  Stay active, pace yourself, listen to your body, and rest when tired  Elevate your legs if  they are swollen. Ask about using compression/support stockings  Stop smoking  Lose weight if you are overweight  Avoid drinking alcohol or limit amount  Stay updated on your immunizations including flu and pneumonia vaccines

## 2022-11-01 NOTE — Clinical Note
Patient was signed up for H[facility] but has not received kit yet. Have you received notice that it has been sent? Please call patient if you have, thank you

## 2022-11-12 ENCOUNTER — HOSPITAL ENCOUNTER (EMERGENCY)
Facility: HOSPITAL | Age: 87
Discharge: HOME | End: 2022-11-12
Attending: EMERGENCY MEDICINE | Admitting: EMERGENCY MEDICINE
Payer: COMMERCIAL

## 2022-11-12 VITALS
HEART RATE: 52 BPM | RESPIRATION RATE: 20 BRPM | BODY MASS INDEX: 28.3 KG/M2 | DIASTOLIC BLOOD PRESSURE: 69 MMHG | SYSTOLIC BLOOD PRESSURE: 151 MMHG | WEIGHT: 178 LBS | OXYGEN SATURATION: 98 % | TEMPERATURE: 97.8 F

## 2022-11-12 DIAGNOSIS — R31.9 HEMATURIA, UNSPECIFIED TYPE: ICD-10-CM

## 2022-11-12 DIAGNOSIS — N13.9 URINARY OBSTRUCTION: ICD-10-CM

## 2022-11-12 LAB
HOLD SPECIMEN: NORMAL
INR PPP: 1.19 (ref 0.85–1.15)

## 2022-11-12 PROCEDURE — 99283 EMERGENCY DEPT VISIT LOW MDM: CPT

## 2022-11-12 PROCEDURE — 250N000009 HC RX 250: Performed by: EMERGENCY MEDICINE

## 2022-11-12 PROCEDURE — 51702 INSERT TEMP BLADDER CATH: CPT

## 2022-11-12 PROCEDURE — 85610 PROTHROMBIN TIME: CPT | Performed by: EMERGENCY MEDICINE

## 2022-11-12 PROCEDURE — 36415 COLL VENOUS BLD VENIPUNCTURE: CPT | Performed by: EMERGENCY MEDICINE

## 2022-11-12 RX ORDER — LIDOCAINE HYDROCHLORIDE 20 MG/ML
10 JELLY TOPICAL ONCE
Status: COMPLETED | OUTPATIENT
Start: 2022-11-12 | End: 2022-11-12

## 2022-11-12 RX ADMIN — LIDOCAINE HYDROCHLORIDE 10 ML: 20 JELLY TOPICAL at 16:02

## 2022-11-12 NOTE — ED PROVIDER NOTES
EMERGENCY DEPARTMENT ENCOUNTER      NAME: Carlos Rubio  AGE: 89 year old male  YOB: 1930  MRN: [id number]  EVALUATION DATE & TIME: No admission date for patient encounter.    PCP: Kyree Bojorquez    ED PROVIDER: Poonam Valverde MD    Chief Complaint   Patient presents with     Catheter Problem     Hematuria         FINAL IMPRESSION:  1. Urinary obstruction    2. Hematuria, unspecified type          ED COURSE & MEDICAL DECISION MAKING:    Pertinent Labs & Imaging studies reviewed. (See chart for details)  89 year old male with history of HTN, HF, A. fib on Coumadin with urinary retention and indwelling Sanders catheter who presents to the Emergency Department for evaluation of hematuria.  Most recent INR therapeutic.  Will obtain repeat INR today to make sure he is not supratherapeutic.  I suspect some of his hematuria may be related to him being anticoagulated and just simple trauma from catheter.  Certainly underlying mass is on the differential, but he is following with urology for this.  May need continuous bladder irrigation based on his description of the amount of hematuria.    Three-way Sanders catheter inserted.  Dark yellow urine output.  No significant hematuria.  No need for continuous bladder irrigation.  INR subtherapeutic at 1.19.  We will discharged home with outpatient urology follow-up.  Encouraged to follow-up with his Coumadin clinic for dose adjustment.      ED Course as of 11/12/22 1623   Sat Nov 12, 2022   1515 3:15 PM I met with patient for initial encounter.   1541 3:41 PM I reassessed patient. Patient is being prepped for catheter.   1547 INR(!): 1.19   1619 4:19 PM I reassessed patient.       Medical Decision Making    Supplemental history from: Family Member    External Record(s) Reviewed: Inpatient Record and Outpatient Record    Differential Diagnosis: See MDM charting for differential considered.     I performed an independent interpretation of the: N/A    Discussed with  radiology regarding test interpretation: N/A    Discussion of management with another provider: See ED Course    The following testing was considered but ultimately not selected: None    I considered prescription management with: N/A    The patient's care impacted: Hypertension    Consideration of Admission/Observation: N/A - Patient discharged without consideration for admission    Care significantly affected by Social Determinants of Health including: N/A    At the conclusion of the encounter I discussed the results of all of the tests and the disposition. The questions were answered. The patient or family acknowledged understanding and was agreeable with the care plan.    MEDICATIONS GIVEN IN THE EMERGENCY:  Medications   lidocaine (XYLOCAINE) 2 % external gel 10 mL (10 mLs Urethral Given 11/12/22 1602)       NEW PRESCRIPTIONS STARTED AT TODAY'S ER VISIT  New Prescriptions    No medications on file          =================================================================    HPI    Patient information was obtained from: Patient    Use of Intrepreter: N/A        Carlos Rubio is a 89 year old male with pertinent medical history of afib, CHF, DM, ARF, HTN,  who presents via walk-in for evaluation of catheter problem, hematuria.    Per chart review, patient was seen in this ED and hospitalized from 10/18 to 10/24 for hypertension. Patient was complaining of urinary frequency and hesitancy, Best placed and patient given recommendation for outpatient follow up.    Patient states he had a best catheter in place and noticed bright red blood in the bag when a home nurse came for a routine catheter change. No new catheter was placed, as the home nurse suspected the catheter was too small. Patient describes the blood as initially bright red but becoming more pink over time. He notes he has a history of urinary obstruction issues due to having an enlarged prostate.    Patient denies all other relevant  symptoms.      REVIEW OF SYSTEMS  Constitutional:  Denies fever, chills, weight loss or weakness  Respiratory: No SOB, wheeze or cough  Cardiovascular:  No CP, palpitations  GI:  Denies abdominal pain, nausea, vomiting, diarrhea  : Denies dysuria. Positive for hematuria.  All other systems negative unless noted in HPI.      PAST MEDICAL HISTORY:  Past Medical History:   Diagnosis Date     A-fib (H)     on coumadin     ARF (acute renal failure) (H) 01/01/1979    States both his kidneys quit. Was going to start dialysis when they started working again.     Arthritis      Atrial fibrillation (H)      Basal cell carcinoma      Carpal tunnel syndrome      Congestive heart failure (H)      Diabetes (H)     MUSA SAID HE DOES NOT HAVE DIABETES 12/11/15.     Heart failure (H)      HTN (hypertension)      Psoriasis      Psoriatic arthritis (H)      Sicca syndrome (H)     MUSA SAID HE DOES NOT HAVE/NEVER HAD SICCA SYNDROME 12/11/15.       PAST SURGICAL HISTORY:  Past Surgical History:   Procedure Laterality Date     ARTHROSCOPY KNEE       IR MISCELLANEOUS PROCEDURE  10/27/2008     IR MISCELLANEOUS PROCEDURE  10/27/2008     RELEASE CARPAL TUNNEL Left        CURRENT MEDICATIONS:    Prior to Admission Medications   Prescriptions Last Dose Informant Patient Reported? Taking?   Cholecalciferol (VITAMIN D3 PO)   Yes No   Sig: Take by mouth daily   bumetanide (BUMEX) 1 MG tablet   No No   Sig: Take 1 tablet (1 mg) by mouth 2 times daily   doxazosin (CARDURA) 4 MG tablet   Yes No   Sig: Take 2 mg by mouth daily    finasteride (PROSCAR) 5 MG tablet   Yes No   Sig: Take 5 mg by mouth daily   hydrALAZINE (APRESOLINE) 50 MG tablet   No No   Sig: Take 0.5 tablets (25 mg) by mouth 3 times daily   metoprolol tartrate (LOPRESSOR) 25 MG tablet   No No   Sig: Take 0.5 tablets (12.5 mg) by mouth 2 times daily   primidone (MYSOLINE) 50 MG tablet   Yes No   Sig: Take 50 mg by mouth 3 times daily   simvastatin (ZOCOR) 40 MG tablet   Yes No    Sig: Take by mouth At Bedtime   spironolactone (ALDACTONE) 25 MG tablet   Yes No   Sig: Take 25 mg by mouth daily   tamsulosin (FLOMAX) 0.4 MG capsule   Yes No   Sig: Take 0.4 mg by mouth 2 times daily   warfarin ANTICOAGULANT (COUMADIN) 5 MG tablet   No No   Sig: Take 0.5 tablets (2.5 mg) by mouth daily      Facility-Administered Medications: None       ALLERGIES:  Allergies   Allergen Reactions     Bactrim [Sulfamethoxazole W/Trimethoprim]        FAMILY HISTORY:  Family History   Problem Relation Age of Onset     Cerebrovascular Disease Mother 76.00     Cerebrovascular Disease Father 81.00     Pacemaker Brother 82.00       SOCIAL HISTORY:  Social History     Tobacco Use     Smoking status: Former     Packs/day: 0.50     Years: 20.00     Pack years: 10.00     Types: Cigarettes     Quit date: 1975     Years since quittin.8     Smokeless tobacco: Never     Tobacco comments:     Smoked a pipe from  to about  (or maybe  - he can't remember)   Substance Use Topics     Alcohol use: Yes     Alcohol/week: 1.0 standard drink     Comment: Alcoholic Drinks/day: Occasionally     Drug use: No        VITALS:  Patient Vitals for the past 24 hrs:   BP Temp Temp src Pulse Resp SpO2 Weight   22 1615 (!) 151/69 -- -- 52 (!) 36 98 % --   22 1600 (!) 176/79 -- -- 70 (!) 37 98 % --   22 1545 (!) 181/88 -- -- 64 (!) 39 95 % --   22 1530 (!) 165/73 -- -- 65 10 98 % --   22 1506 (!) 151/71 97.8  F (36.6  C) Temporal 64 16 98 % 80.7 kg (178 lb)       PHYSICAL EXAM    General Appearance: Well-appearing, well-nourished, no acute distress, some blood on jeans.  Head:  Normocephalic  Eyes: conjunctiva/corneas clear  ENT:  membranes are moist without pallor  Neck:  Supple  Cardio:  Regular rate irregularly irregular rhythm  Pulm:  No respiratory distress  Back:  No CVA tenderness, normal ROM  Abdomen:  Soft, non-tender, non distended,no rebound or guarding.  : Catheter not currently in  place  Extremities: Ambulates with walker  Skin:  Skin warm, dry, no rashes  Neuro:  Alert and oriented ×3     RADIOLOGY/LABS:  Reviewed all pertinent imaging. Please see official radiology report. All pertinent labs reviewed and interpreted.    Results for orders placed or performed during the hospital encounter of 11/12/22   Result Value Ref Range    INR 1.19 (H) 0.85 - 1.15           The creation of this record is based on the scribe s observations of the work being performed by Poonam Valverde MD and the provider s statements to them. It was created on her behalf by Mor Lee, a trained medical scribe. This document has been checked and approved by the attending provider.    Poonam Valverde MD  Emergency Medicine  [facility] EMERGENCY DEPARTMENT  [address] [place] [id number] [phone]  Dept: [phone]      Poonam Valverde MD  11/12/22 1365

## 2022-11-12 NOTE — ED NOTES
ER DISCHARGE NOTE:   Carlos Rubio MRN: [id number]      Carlos Rubio is ok to discharge from the emergency room.    (N13.9) Urinary obstruction: indwelling 3 way best sent with pt    (R31.9) Hematuria, unspecified type      Carlos Rubio discharged via on foot with son.    Verbalized understanding of discharge instructions.   Prescriptions: not prescribed.    AVS in hand.

## 2022-11-12 NOTE — ED TRIAGE NOTES
Pt had a home nurse come out today to change his catheter but didn't have the correct size. Pt has had hematuria since this morning . Pt is on Coumadin.      Triage Assessment     Row Name 11/12/22 8410       Triage Assessment (Adult)    Airway WDL WDL       Respiratory WDL    Respiratory WDL WDL       Skin Circulation/Temperature WDL    Skin Circulation/Temperature WDL WDL       Cardiac WDL    Cardiac WDL WDL       Peripheral/Neurovascular WDL    Peripheral Neurovascular WDL WDL       Cognitive/Neuro/Behavioral WDL    Cognitive/Neuro/Behavioral WDL WDL

## 2022-11-12 NOTE — ED NOTES
Order received per ER MD to placed 3 way best for possible CBI.  Pt takes coumadin for hx of Afib.  ER visit today voiding bloody, had prior best removed, now unable to void on his own.  3 way best placed, 22F, 30mL balloon.  Immediate dark yellow return.

## 2022-12-10 DIAGNOSIS — I10 BENIGN ESSENTIAL HYPERTENSION: ICD-10-CM

## 2022-12-12 RX ORDER — METOPROLOL TARTRATE 25 MG/1
TABLET, FILM COATED ORAL
Qty: 90 TABLET | Refills: 0 | Status: SHIPPED | OUTPATIENT
Start: 2022-12-12 | End: 2023-01-01 | Stop reason: ALTCHOICE

## 2023-01-01 ENCOUNTER — LAB REQUISITION (OUTPATIENT)
Dept: LAB | Facility: CLINIC | Age: 88
End: 2023-01-01

## 2023-01-01 ENCOUNTER — TRANSFERRED RECORDS (OUTPATIENT)
Dept: HEALTH INFORMATION MANAGEMENT | Facility: CLINIC | Age: 88
End: 2023-01-01

## 2023-01-01 ENCOUNTER — HOSPITAL ENCOUNTER (EMERGENCY)
Facility: HOSPITAL | Age: 88
Discharge: HOME | End: 2023-10-27
Attending: STUDENT IN AN ORGANIZED HEALTH CARE EDUCATION/TRAINING PROGRAM | Admitting: STUDENT IN AN ORGANIZED HEALTH CARE EDUCATION/TRAINING PROGRAM
Payer: COMMERCIAL

## 2023-01-01 ENCOUNTER — OFFICE VISIT (OUTPATIENT)
Dept: CARDIOLOGY | Facility: CLINIC | Age: 88
End: 2023-01-01
Attending: INTERNAL MEDICINE
Payer: COMMERCIAL

## 2023-01-01 ENCOUNTER — HOSPITAL ENCOUNTER (OUTPATIENT)
Dept: INTERVENTIONAL RADIOLOGY/VASCULAR | Facility: HOSPITAL | Age: 88
Discharge: HOME | End: 2023-05-25
Admitting: RADIOLOGY
Payer: COMMERCIAL

## 2023-01-01 ENCOUNTER — APPOINTMENT (OUTPATIENT)
Dept: RADIOLOGY | Facility: HOSPITAL | Age: 88
End: 2023-01-01
Attending: STUDENT IN AN ORGANIZED HEALTH CARE EDUCATION/TRAINING PROGRAM
Payer: COMMERCIAL

## 2023-01-01 ENCOUNTER — TELEPHONE (OUTPATIENT)
Dept: INTERVENTIONAL RADIOLOGY/VASCULAR | Facility: HOSPITAL | Age: 88
End: 2023-01-01
Payer: COMMERCIAL

## 2023-01-01 VITALS
OXYGEN SATURATION: 93 % | WEIGHT: 155 LBS | TEMPERATURE: 98.4 F | DIASTOLIC BLOOD PRESSURE: 84 MMHG | SYSTOLIC BLOOD PRESSURE: 166 MMHG | HEIGHT: 69 IN | RESPIRATION RATE: 16 BRPM | HEART RATE: 74 BPM | BODY MASS INDEX: 22.96 KG/M2

## 2023-01-01 VITALS
RESPIRATION RATE: 16 BRPM | BODY MASS INDEX: 22.81 KG/M2 | HEART RATE: 58 BPM | DIASTOLIC BLOOD PRESSURE: 68 MMHG | SYSTOLIC BLOOD PRESSURE: 150 MMHG | WEIGHT: 150 LBS | OXYGEN SATURATION: 96 %

## 2023-01-01 VITALS
OXYGEN SATURATION: 95 % | SYSTOLIC BLOOD PRESSURE: 162 MMHG | RESPIRATION RATE: 18 BRPM | HEART RATE: 49 BPM | DIASTOLIC BLOOD PRESSURE: 72 MMHG

## 2023-01-01 DIAGNOSIS — E11.9 TYPE 2 DIABETES MELLITUS WITHOUT COMPLICATIONS (H): ICD-10-CM

## 2023-01-01 DIAGNOSIS — J15.9 COMMUNITY ACQUIRED BACTERIAL PNEUMONIA: ICD-10-CM

## 2023-01-01 DIAGNOSIS — N40.1 BENIGN PROSTATIC HYPERPLASIA WITH URINARY RETENTION: ICD-10-CM

## 2023-01-01 DIAGNOSIS — N17.0 ACUTE KIDNEY FAILURE WITH TUBULAR NECROSIS (H): ICD-10-CM

## 2023-01-01 DIAGNOSIS — I11.0 HYPERTENSIVE HEART DISEASE WITH HEART FAILURE (H): ICD-10-CM

## 2023-01-01 DIAGNOSIS — R33.8 BENIGN PROSTATIC HYPERPLASIA WITH URINARY RETENTION: ICD-10-CM

## 2023-01-01 DIAGNOSIS — I10 BENIGN ESSENTIAL HYPERTENSION: ICD-10-CM

## 2023-01-01 DIAGNOSIS — I48.19 PERSISTENT ATRIAL FIBRILLATION (H): ICD-10-CM

## 2023-01-01 DIAGNOSIS — R33.9 URINARY RETENTION: ICD-10-CM

## 2023-01-01 DIAGNOSIS — I50.33 ACUTE ON CHRONIC DIASTOLIC CONGESTIVE HEART FAILURE (H): Primary | ICD-10-CM

## 2023-01-01 DIAGNOSIS — E78.2 MIXED HYPERLIPIDEMIA: ICD-10-CM

## 2023-01-01 LAB
ANION GAP SERPL CALCULATED.3IONS-SCNC: 13 MMOL/L (ref 7–15)
ANION GAP SERPL CALCULATED.3IONS-SCNC: 5 MMOL/L (ref 7–15)
APTT PPP: 56 SECONDS (ref 22–38)
BASOPHILS # BLD AUTO: 0.1 10E3/UL (ref 0–0.2)
BASOPHILS NFR BLD AUTO: 1 %
BUN SERPL-MCNC: 15.9 MG/DL (ref 8–23)
BUN SERPL-MCNC: 16 MG/DL (ref 8–23)
CALCIUM SERPL-MCNC: 9.2 MG/DL (ref 8.2–9.6)
CALCIUM SERPL-MCNC: 9.5 MG/DL (ref 8.2–9.6)
CHLORIDE SERPL-SCNC: 100 MMOL/L (ref 98–107)
CHLORIDE SERPL-SCNC: 102 MMOL/L (ref 98–107)
CHOLEST SERPL-MCNC: 123 MG/DL
CREAT SERPL-MCNC: 0.86 MG/DL (ref 0.67–1.17)
CREAT SERPL-MCNC: 1.04 MG/DL (ref 0.67–1.17)
CREAT UR-MCNC: 39.6 MG/DL
DEPRECATED HCO3 PLAS-SCNC: 27 MMOL/L (ref 22–29)
DEPRECATED HCO3 PLAS-SCNC: 34 MMOL/L (ref 22–29)
EGFRCR SERPLBLD CKD-EPI 2021: 81 ML/MIN/1.73M2
EOSINOPHIL # BLD AUTO: 0 10E3/UL (ref 0–0.7)
EOSINOPHIL NFR BLD AUTO: 0 %
ERYTHROCYTE [DISTWIDTH] IN BLOOD BY AUTOMATED COUNT: 14.6 % (ref 10–15)
GFR SERPL CREATININE-BSD FRML MDRD: 67 ML/MIN/1.73M2
GLUCOSE SERPL-MCNC: 83 MG/DL (ref 70–99)
GLUCOSE SERPL-MCNC: 92 MG/DL (ref 70–99)
HCT VFR BLD AUTO: 41.1 % (ref 40–53)
HDLC SERPL-MCNC: 32 MG/DL
HGB BLD-MCNC: 12.7 G/DL (ref 13.3–17.7)
IMM GRANULOCYTES # BLD: 0.1 10E3/UL
IMM GRANULOCYTES NFR BLD: 1 %
INR PPP: 3.34 (ref 0.85–1.15)
LDLC SERPL CALC-MCNC: 70 MG/DL
LYMPHOCYTES # BLD AUTO: 0.9 10E3/UL (ref 0.8–5.3)
LYMPHOCYTES NFR BLD AUTO: 10 %
MCH RBC QN AUTO: 30.3 PG (ref 26.5–33)
MCHC RBC AUTO-ENTMCNC: 30.9 G/DL (ref 31.5–36.5)
MCV RBC AUTO: 98 FL (ref 78–100)
MICROALBUMIN UR-MCNC: 324 MG/L
MICROALBUMIN/CREAT UR: 818.18 MG/G CR (ref 0–17)
MONOCYTES # BLD AUTO: 0.6 10E3/UL (ref 0–1.3)
MONOCYTES NFR BLD AUTO: 7 %
NEUTROPHILS # BLD AUTO: 6.9 10E3/UL (ref 1.6–8.3)
NEUTROPHILS NFR BLD AUTO: 81 %
NONHDLC SERPL-MCNC: 91 MG/DL
NRBC # BLD AUTO: 0 10E3/UL
NRBC BLD AUTO-RTO: 0 /100
PLATELET # BLD AUTO: 341 10E3/UL (ref 150–450)
POTASSIUM SERPL-SCNC: 3.6 MMOL/L (ref 3.4–5.3)
POTASSIUM SERPL-SCNC: 4 MMOL/L (ref 3.4–5.3)
RBC # BLD AUTO: 4.19 10E6/UL (ref 4.4–5.9)
SODIUM SERPL-SCNC: 139 MMOL/L (ref 135–145)
SODIUM SERPL-SCNC: 142 MMOL/L (ref 136–145)
TRIGL SERPL-MCNC: 106 MG/DL
WBC # BLD AUTO: 8.5 10E3/UL (ref 4–11)

## 2023-01-01 PROCEDURE — 255N000002 HC RX 255 OP 636: Performed by: RADIOLOGY

## 2023-01-01 PROCEDURE — 85025 COMPLETE CBC W/AUTO DIFF WBC: CPT | Performed by: STUDENT IN AN ORGANIZED HEALTH CARE EDUCATION/TRAINING PROGRAM

## 2023-01-01 PROCEDURE — 71046 X-RAY EXAM CHEST 2 VIEWS: CPT

## 2023-01-01 PROCEDURE — 36415 COLL VENOUS BLD VENIPUNCTURE: CPT | Performed by: STUDENT IN AN ORGANIZED HEALTH CARE EDUCATION/TRAINING PROGRAM

## 2023-01-01 PROCEDURE — 85610 PROTHROMBIN TIME: CPT | Performed by: STUDENT IN AN ORGANIZED HEALTH CARE EDUCATION/TRAINING PROGRAM

## 2023-01-01 PROCEDURE — 51705 CHANGE OF BLADDER TUBE: CPT

## 2023-01-01 PROCEDURE — 80048 BASIC METABOLIC PNL TOTAL CA: CPT | Performed by: STUDENT IN AN ORGANIZED HEALTH CARE EDUCATION/TRAINING PROGRAM

## 2023-01-01 PROCEDURE — 82570 ASSAY OF URINE CREATININE: CPT | Performed by: FAMILY MEDICINE

## 2023-01-01 PROCEDURE — 99284 EMERGENCY DEPT VISIT MOD MDM: CPT | Mod: 25

## 2023-01-01 PROCEDURE — 80061 LIPID PANEL: CPT | Performed by: FAMILY MEDICINE

## 2023-01-01 PROCEDURE — 80048 BASIC METABOLIC PNL TOTAL CA: CPT | Performed by: FAMILY MEDICINE

## 2023-01-01 PROCEDURE — C1769 GUIDE WIRE: HCPCS

## 2023-01-01 PROCEDURE — 85730 THROMBOPLASTIN TIME PARTIAL: CPT | Performed by: STUDENT IN AN ORGANIZED HEALTH CARE EDUCATION/TRAINING PROGRAM

## 2023-01-01 PROCEDURE — C1729 CATH, DRAINAGE: HCPCS

## 2023-01-01 PROCEDURE — 99214 OFFICE O/P EST MOD 30 MIN: CPT | Performed by: INTERNAL MEDICINE

## 2023-01-01 RX ORDER — PROPRANOLOL HYDROCHLORIDE 20 MG/1
20 TABLET ORAL 2 TIMES DAILY
COMMUNITY

## 2023-01-01 RX ORDER — DOXYCYCLINE 100 MG/1
100 CAPSULE ORAL 2 TIMES DAILY
Qty: 14 CAPSULE | Refills: 0 | Status: SHIPPED | OUTPATIENT
Start: 2023-01-01 | End: 2023-01-01

## 2023-01-01 RX ORDER — ACETAMINOPHEN 500 MG
2 TABLET ORAL EVERY 6 HOURS PRN
COMMUNITY

## 2023-01-01 RX ORDER — NALOXONE HYDROCHLORIDE 0.4 MG/ML
0.4 INJECTION, SOLUTION INTRAMUSCULAR; INTRAVENOUS; SUBCUTANEOUS
Status: DISCONTINUED | OUTPATIENT
Start: 2023-01-01 | End: 2023-01-01 | Stop reason: HOSPADM

## 2023-01-01 RX ORDER — LIDOCAINE 40 MG/G
CREAM TOPICAL
Status: CANCELLED | OUTPATIENT
Start: 2023-01-01

## 2023-01-01 RX ORDER — NALOXONE HYDROCHLORIDE 0.4 MG/ML
0.2 INJECTION, SOLUTION INTRAMUSCULAR; INTRAVENOUS; SUBCUTANEOUS
Status: DISCONTINUED | OUTPATIENT
Start: 2023-01-01 | End: 2023-01-01 | Stop reason: HOSPADM

## 2023-01-01 RX ORDER — FENTANYL CITRATE 50 UG/ML
25-50 INJECTION, SOLUTION INTRAMUSCULAR; INTRAVENOUS EVERY 5 MIN PRN
Status: DISCONTINUED | OUTPATIENT
Start: 2023-01-01 | End: 2023-01-01 | Stop reason: HOSPADM

## 2023-01-01 RX ADMIN — IOHEXOL 5 ML: 350 INJECTION, SOLUTION INTRAVENOUS at 12:08

## 2023-01-09 ENCOUNTER — OFFICE VISIT (OUTPATIENT)
Dept: CARDIOLOGY | Facility: CLINIC | Age: 88
End: 2023-01-09
Payer: COMMERCIAL

## 2023-01-09 VITALS
BODY MASS INDEX: 27.98 KG/M2 | RESPIRATION RATE: 16 BRPM | OXYGEN SATURATION: 98 % | SYSTOLIC BLOOD PRESSURE: 104 MMHG | WEIGHT: 176 LBS | DIASTOLIC BLOOD PRESSURE: 52 MMHG | HEART RATE: 56 BPM
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
## 2023-01-09 DIAGNOSIS — N17.0 ACUTE KIDNEY FAILURE WITH TUBULAR NECROSIS (H): ICD-10-CM

## 2023-01-09 DIAGNOSIS — N40.1 BENIGN PROSTATIC HYPERPLASIA WITH URINARY RETENTION: ICD-10-CM

## 2023-01-09 DIAGNOSIS — I10 BENIGN ESSENTIAL HYPERTENSION: ICD-10-CM

## 2023-01-09 DIAGNOSIS — I50.33 ACUTE ON CHRONIC DIASTOLIC CONGESTIVE HEART FAILURE (H): Primary | ICD-10-CM

## 2023-01-09 DIAGNOSIS — R33.8 BENIGN PROSTATIC HYPERPLASIA WITH URINARY RETENTION: ICD-10-CM

## 2023-01-09 DIAGNOSIS — I48.19 PERSISTENT ATRIAL FIBRILLATION (H): ICD-10-CM

## 2023-01-09 PROCEDURE — 80048 BASIC METABOLIC PNL TOTAL CA: CPT | Performed by: INTERNAL MEDICINE

## 2023-01-09 PROCEDURE — 99214 OFFICE O/P EST MOD 30 MIN: CPT | Mod: 25 | Performed by: INTERNAL MEDICINE

## 2023-01-09 PROCEDURE — 36415 COLL VENOUS BLD VENIPUNCTURE: CPT | Performed by: INTERNAL MEDICINE

## 2023-01-09 RX ORDER — TRIAMCINOLONE ACETONIDE 1 MG/G
OINTMENT TOPICAL
COMMUNITY
End: 2023-01-01

## 2023-01-09 NOTE — PROGRESS NOTES
[facility]  Heart Care Clinic Follow-up Note    Assessment & Plan        (I50.33) Acute on chronic diastolic congestive heart failure (H)  (primary encounter diagnosis)  Comment: Acute on chronic in the setting of preserved ejection fraction of 55 to 60%, currently asymptomatic now with Bumex 1 mg by mouth twice a day.  We will recheck renal profile today to determine no worsening renal function.    (I10) Benign essential hypertension  Comment: Blood pressure under good control today if anything a little bit on the low side.  Taking hydralazine 12-1/2 mg by mouth 3 times a day, cut that down to twice a day.  Metoprolol 12.5 mg by mouth twice a day, also on Cardura, Proscar, as well as tamsulosin.  Thus the reason the lower dose as above.    (I48.19) Persistent atrial fibrillation (H)  Comment: Permanent, asymptomatic, not valvular and on chronic anticoagulation with Coumadin with primary adjusting.  Most recent INR subtherapeutic at 1.19.    (N17.0) Acute kidney failure with tubular necrosis (H)  Comment: Creatinine increased to 1.33 with GFR 50 in October 27, 2022, will recheck today.    (W19.XXXA) Fall, initial encounter  Comment: He declines any syncope or lightheadedness but given this we will back off on dose of hydralazine as above.    (N40.1,  R33.8) Benign prostatic hyperplasia with urinary retention  Comment: Fairly symptomatic, has urinary bag, on tamsulosin Flomax as well as Proscar.  Defer to primary and urology.    Plan  1.  Check renal profile today and if creatinine significantly elevated will need to adjust meds.  2.  Cut hydralazine down to 12.5 mg mouth twice a day.  3.  Follow-up with me in 6 months or sooner if needed.    Subjective  CC: 89-year-old white gentleman being seen in follow-up.  I saw him in the hospital, he lives alone independently, a son looks in on him he lives a mile away.  The patient has urinary retention and has a bag in place.  This leaks a little bit.  He denies  any chest pains, palpitations, PND, orthopnea, shortness of breath, syncope, presyncope or peripheral edema.    Medications  Current Outpatient Medications   Medication Sig Dispense Refill     bumetanide (BUMEX) 1 MG tablet Take 1 tablet (1 mg) by mouth 2 times daily 30 tablet 0     Cholecalciferol (VITAMIN D3 PO) Take by mouth daily       doxazosin (CARDURA) 4 MG tablet Take 2 mg by mouth daily        finasteride (PROSCAR) 5 MG tablet Take 5 mg by mouth daily       hydrALAZINE (APRESOLINE) 50 MG tablet Take 0.5 tablets (25 mg) by mouth 3 times daily 90 tablet 0     metoprolol tartrate (LOPRESSOR) 25 MG tablet TAKE 0.5 TABLETS BY MOUTH 2 TIMES DAILY. 90 tablet 0     primidone (MYSOLINE) 50 MG tablet Take 50 mg by mouth 3 times daily       simvastatin (ZOCOR) 40 MG tablet Take by mouth At Bedtime       spironolactone (ALDACTONE) 25 MG tablet Take 25 mg by mouth daily       tamsulosin (FLOMAX) 0.4 MG capsule Take 0.4 mg by mouth 2 times daily       triamcinolone (KENALOG) 0.1 % external ointment triamcinolone acetonide 0.1 % topical ointment   PLEASE SEE ATTACHED FOR DETAILED DIRECTIONS       warfarin ANTICOAGULANT (COUMADIN) 5 MG tablet Take 0.5 tablets (2.5 mg) by mouth daily 30 tablet 0       Objective  /52 (BP Location: Left arm, Patient Position: Sitting, Cuff Size: Adult Regular)   Pulse 56   Resp 16   Wt 79.8 kg (176 lb)   SpO2 98%   BMI 27.98 kg/m      General Appearance:    Alert, cooperative, no distress, appears stated age   Head:    Normocephalic, without obvious abnormality, atraumatic   Throat:   Lips, mucosa, and tongue normal; teeth and gums normal   Neck:   Supple, symmetrical, trachea midline, no adenopathy;        thyroid:  No enlargement/tenderness/nodules; no carotid    bruit or JVD   Back:     Symmetric, no curvature, ROM normal, no CVA tenderness   Lungs:     Clear to auscultation bilaterally, respirations unlabored   Chest wall:    No tenderness or deformity   Heart:   Irregularly  irregular, S1 and S2 normal, no murmur, rub   or gallop   Abdomen:     Soft, non-tender, bowel sounds active all four quadrants,     no masses, no organomegaly   Extremities:   Normal, atraumatic, no cyanosis or edema   Pulses:   2+ and symmetric all extremities   Skin:   Skin color, texture, turgor normal, no rashes or lesions     Results    Lab Results personally reviewed   Lab Results   Component Value Date    CHOL 147 10/06/2022    CHOL 145 04/13/2022     Lab Results   Component Value Date    HDL 47 10/06/2022    HDL 45 04/13/2022     No components found for: LDLCALC  Lab Results   Component Value Date    TRIG 120 10/06/2022    TRIG 104 04/13/2022     Lab Results   Component Value Date    WBC 11.8 (H) 10/24/2022    HGB 13.3 10/24/2022    HCT 41.2 10/24/2022     10/24/2022     Lab Results   Component Value Date    BUN 63.0 (H) 10/27/2022     10/27/2022    CO2 27 10/27/2022

## 2023-01-09 NOTE — PATIENT INSTRUCTIONS
Carlos ETHAN Rubio,  I enjoyed visiting with you again today.  I am glad to hear you are doing well.  Per our conversation I will check the blood work on the kidney function today considering that his been slowly going down.  In addition, I am going to ask you to cut the hydralazine down to half a tablet only twice a day.  I will plan on seeing you in 6 months or sooner if needed.  Chase Sewell

## 2023-01-09 NOTE — LETTER
1/9/2023    Kyree Bojorquez MD  [address] [address]    RE: Carlos Rubio       Dear Colleague,     I had the pleasure of seeing Carlos Rubio in the [facility].      [facility]  Heart Care Clinic Follow-up Note    Assessment & Plan        (I50.33) Acute on chronic diastolic congestive heart failure (H)  (primary encounter diagnosis)  Comment: Acute on chronic in the setting of preserved ejection fraction of 55 to 60%, currently asymptomatic now with Bumex 1 mg by mouth twice a day.  We will recheck renal profile today to determine no worsening renal function.    (I10) Benign essential hypertension  Comment: Blood pressure under good control today if anything a little bit on the low side.  Taking hydralazine 12-1/2 mg by mouth 3 times a day, cut that down to twice a day.  Metoprolol 12.5 mg by mouth twice a day, also on Cardura, Proscar, as well as tamsulosin.  Thus the reason the lower dose as above.    (I48.19) Persistent atrial fibrillation (H)  Comment: Permanent, asymptomatic, not valvular and on chronic anticoagulation with Coumadin with primary adjusting.  Most recent INR subtherapeutic at 1.19.    (N17.0) Acute kidney failure with tubular necrosis (H)  Comment: Creatinine increased to 1.33 with GFR 50 in October 27, 2022, will recheck today.    (W19.XXXA) Fall, initial encounter  Comment: He declines any syncope or lightheadedness but given this we will back off on dose of hydralazine as above.    (N40.1,  R33.8) Benign prostatic hyperplasia with urinary retention  Comment: Fairly symptomatic, has urinary bag, on tamsulosin Flomax as well as Proscar.  Defer to primary and urology.    Plan  1.  Check renal profile today and if creatinine significantly elevated will need to adjust meds.  2.  Cut hydralazine down to 12.5 mg mouth twice a day.  3.  Follow-up with me in 6 months or sooner if needed.    Subjective  CC: 89-year-old white gentleman being seen in  follow-up.  I saw him in the hospital, he lives alone independently, a son looks in on him he lives a mile away.  The patient has urinary retention and has a bag in place.  This leaks a little bit.  He denies any chest pains, palpitations, PND, orthopnea, shortness of breath, syncope, presyncope or peripheral edema.    Medications  Current Outpatient Medications   Medication Sig Dispense Refill     bumetanide (BUMEX) 1 MG tablet Take 1 tablet (1 mg) by mouth 2 times daily 30 tablet 0     Cholecalciferol (VITAMIN D3 PO) Take by mouth daily       doxazosin (CARDURA) 4 MG tablet Take 2 mg by mouth daily        finasteride (PROSCAR) 5 MG tablet Take 5 mg by mouth daily       hydrALAZINE (APRESOLINE) 50 MG tablet Take 0.5 tablets (25 mg) by mouth 3 times daily 90 tablet 0     metoprolol tartrate (LOPRESSOR) 25 MG tablet TAKE 0.5 TABLETS BY MOUTH 2 TIMES DAILY. 90 tablet 0     primidone (MYSOLINE) 50 MG tablet Take 50 mg by mouth 3 times daily       simvastatin (ZOCOR) 40 MG tablet Take by mouth At Bedtime       spironolactone (ALDACTONE) 25 MG tablet Take 25 mg by mouth daily       tamsulosin (FLOMAX) 0.4 MG capsule Take 0.4 mg by mouth 2 times daily       triamcinolone (KENALOG) 0.1 % external ointment triamcinolone acetonide 0.1 % topical ointment   PLEASE SEE ATTACHED FOR DETAILED DIRECTIONS       warfarin ANTICOAGULANT (COUMADIN) 5 MG tablet Take 0.5 tablets (2.5 mg) by mouth daily 30 tablet 0       Objective  /52 (BP Location: Left arm, Patient Position: Sitting, Cuff Size: Adult Regular)   Pulse 56   Resp 16   Wt 79.8 kg (176 lb)   SpO2 98%   BMI 27.98 kg/m      General Appearance:    Alert, cooperative, no distress, appears stated age   Head:    Normocephalic, without obvious abnormality, atraumatic   Throat:   Lips, mucosa, and tongue normal; teeth and gums normal   Neck:   Supple, symmetrical, trachea midline, no adenopathy;        thyroid:  No enlargement/tenderness/nodules; no carotid    bruit or  JVD   Back:     Symmetric, no curvature, ROM normal, no CVA tenderness   Lungs:     Clear to auscultation bilaterally, respirations unlabored   Chest wall:    No tenderness or deformity   Heart:   Irregularly irregular, S1 and S2 normal, no murmur, rub   or gallop   Abdomen:     Soft, non-tender, bowel sounds active all four quadrants,     no masses, no organomegaly   Extremities:   Normal, atraumatic, no cyanosis or edema   Pulses:   2+ and symmetric all extremities   Skin:   Skin color, texture, turgor normal, no rashes or lesions     Results    Lab Results personally reviewed   Lab Results   Component Value Date    CHOL 147 10/06/2022    CHOL 145 04/13/2022     Lab Results   Component Value Date    HDL 47 10/06/2022    HDL 45 04/13/2022     No components found for: LDLCALC  Lab Results   Component Value Date    TRIG 120 10/06/2022    TRIG 104 04/13/2022     Lab Results   Component Value Date    WBC 11.8 (H) 10/24/2022    HGB 13.3 10/24/2022    HCT 41.2 10/24/2022     10/24/2022     Lab Results   Component Value Date    BUN 63.0 (H) 10/27/2022     10/27/2022    CO2 27 10/27/2022               Thank you for allowing me to participate in the care of your patient.      Sincerely,     DONTE RODRÍGUEZ MD     [facility]  cc:   No referring provider defined for this encounter.

## 2023-01-10 LAB
ANION GAP SERPL CALCULATED.3IONS-SCNC: 10 MMOL/L (ref 7–15)
BUN SERPL-MCNC: 20 MG/DL (ref 8–23)
CALCIUM SERPL-MCNC: 9.4 MG/DL (ref 8.2–9.6)
CHLORIDE SERPL-SCNC: 99 MMOL/L (ref 98–107)
CREAT SERPL-MCNC: 1.04 MG/DL (ref 0.67–1.17)
DEPRECATED HCO3 PLAS-SCNC: 29 MMOL/L (ref 22–29)
GFR SERPL CREATININE-BSD FRML MDRD: 67 ML/MIN/1.73M2
GLUCOSE SERPL-MCNC: 107 MG/DL (ref 70–99)
POTASSIUM SERPL-SCNC: 3.7 MMOL/L (ref 3.4–5.3)
SODIUM SERPL-SCNC: 138 MMOL/L (ref 136–145)

## 2023-02-12 ENCOUNTER — HOSPITAL ENCOUNTER (EMERGENCY)
Facility: HOSPITAL | Age: 88
Discharge: HOME | End: 2023-02-12
Attending: EMERGENCY MEDICINE | Admitting: EMERGENCY MEDICINE
Payer: COMMERCIAL

## 2023-02-12 VITALS
OXYGEN SATURATION: 96 % | SYSTOLIC BLOOD PRESSURE: 122 MMHG | HEIGHT: 68 IN | DIASTOLIC BLOOD PRESSURE: 64 MMHG | BODY MASS INDEX: 26.22 KG/M2 | HEART RATE: 71 BPM | WEIGHT: 173 LBS | RESPIRATION RATE: 18 BRPM | TEMPERATURE: 98.4 F

## 2023-02-12 DIAGNOSIS — Z46.6 URINARY CATHETER (FOLEY) CHANGE REQUIRED: ICD-10-CM

## 2023-02-12 DIAGNOSIS — N39.0 ACUTE UTI: ICD-10-CM

## 2023-02-12 DIAGNOSIS — R79.1 ELEVATED INR: ICD-10-CM

## 2023-02-12 DIAGNOSIS — R31.9 HEMATURIA, UNSPECIFIED TYPE: ICD-10-CM

## 2023-02-12 LAB
ALBUMIN UR-MCNC: 100 MG/DL
ANION GAP SERPL CALCULATED.3IONS-SCNC: 10 MMOL/L (ref 7–15)
APPEARANCE UR: ABNORMAL
APTT PPP: 74 SECONDS (ref 22–38)
BACTERIA #/AREA URNS HPF: ABNORMAL /HPF
BASOPHILS # BLD AUTO: 0 10E3/UL (ref 0–0.2)
BASOPHILS NFR BLD AUTO: 0 %
BILIRUB UR QL STRIP: NEGATIVE
BUN SERPL-MCNC: 19.6 MG/DL (ref 8–23)
CALCIUM SERPL-MCNC: 8.9 MG/DL (ref 8.2–9.6)
CHLORIDE SERPL-SCNC: 99 MMOL/L (ref 98–107)
COLOR UR AUTO: ABNORMAL
CREAT SERPL-MCNC: 1.1 MG/DL (ref 0.67–1.17)
DEPRECATED HCO3 PLAS-SCNC: 26 MMOL/L (ref 22–29)
EOSINOPHIL # BLD AUTO: 0 10E3/UL (ref 0–0.7)
EOSINOPHIL NFR BLD AUTO: 0 %
ERYTHROCYTE [DISTWIDTH] IN BLOOD BY AUTOMATED COUNT: 12.7 % (ref 10–15)
GFR SERPL CREATININE-BSD FRML MDRD: 63 ML/MIN/1.73M2
GLUCOSE SERPL-MCNC: 123 MG/DL (ref 70–99)
GLUCOSE UR STRIP-MCNC: NEGATIVE MG/DL
HCT VFR BLD AUTO: 37.2 % (ref 40–53)
HGB BLD-MCNC: 12.3 G/DL (ref 13.3–17.7)
HGB UR QL STRIP: ABNORMAL
HOLD SPECIMEN: NORMAL
IMM GRANULOCYTES # BLD: 0.1 10E3/UL
IMM GRANULOCYTES NFR BLD: 1 %
INR PPP: 8.54 (ref 0.85–1.15)
KETONES UR STRIP-MCNC: NEGATIVE MG/DL
LEUKOCYTE ESTERASE UR QL STRIP: ABNORMAL
LYMPHOCYTES # BLD AUTO: 0.7 10E3/UL (ref 0.8–5.3)
LYMPHOCYTES NFR BLD AUTO: 6 %
MCH RBC QN AUTO: 31.9 PG (ref 26.5–33)
MCHC RBC AUTO-ENTMCNC: 33.1 G/DL (ref 31.5–36.5)
MCV RBC AUTO: 96 FL (ref 78–100)
MONOCYTES # BLD AUTO: 0.9 10E3/UL (ref 0–1.3)
MONOCYTES NFR BLD AUTO: 7 %
MUCOUS THREADS #/AREA URNS LPF: PRESENT /LPF
NEUTROPHILS # BLD AUTO: 10.6 10E3/UL (ref 1.6–8.3)
NEUTROPHILS NFR BLD AUTO: 86 %
NITRATE UR QL: POSITIVE
NRBC # BLD AUTO: 0 10E3/UL
NRBC BLD AUTO-RTO: 0 /100
PH UR STRIP: 5.5 [PH] (ref 5–7)
PLATELET # BLD AUTO: 226 10E3/UL (ref 150–450)
POTASSIUM SERPL-SCNC: 3.4 MMOL/L (ref 3.4–5.3)
RBC # BLD AUTO: 3.86 10E6/UL (ref 4.4–5.9)
RBC URINE: >182 /HPF
SODIUM SERPL-SCNC: 135 MMOL/L (ref 136–145)
SP GR UR STRIP: 1.02 (ref 1–1.03)
UROBILINOGEN UR STRIP-MCNC: <2 MG/DL
WBC # BLD AUTO: 12.3 10E3/UL (ref 4–11)
WBC CLUMPS #/AREA URNS HPF: PRESENT /HPF
WBC URINE: >182 /HPF

## 2023-02-12 PROCEDURE — 250N000013 HC RX MED GY IP 250 OP 250 PS 637: Performed by: EMERGENCY MEDICINE

## 2023-02-12 PROCEDURE — 85610 PROTHROMBIN TIME: CPT | Performed by: EMERGENCY MEDICINE

## 2023-02-12 PROCEDURE — 80048 BASIC METABOLIC PNL TOTAL CA: CPT | Performed by: EMERGENCY MEDICINE

## 2023-02-12 PROCEDURE — 99284 EMERGENCY DEPT VISIT MOD MDM: CPT | Mod: 25

## 2023-02-12 PROCEDURE — 250N000009 HC RX 250: Performed by: EMERGENCY MEDICINE

## 2023-02-12 PROCEDURE — 87086 URINE CULTURE/COLONY COUNT: CPT | Performed by: EMERGENCY MEDICINE

## 2023-02-12 PROCEDURE — 81001 URINALYSIS AUTO W/SCOPE: CPT | Performed by: EMERGENCY MEDICINE

## 2023-02-12 PROCEDURE — 85730 THROMBOPLASTIN TIME PARTIAL: CPT | Performed by: EMERGENCY MEDICINE

## 2023-02-12 PROCEDURE — 96365 THER/PROPH/DIAG IV INF INIT: CPT

## 2023-02-12 PROCEDURE — 51702 INSERT TEMP BLADDER CATH: CPT

## 2023-02-12 PROCEDURE — 36415 COLL VENOUS BLD VENIPUNCTURE: CPT | Performed by: EMERGENCY MEDICINE

## 2023-02-12 PROCEDURE — 250N000011 HC RX IP 250 OP 636: Performed by: EMERGENCY MEDICINE

## 2023-02-12 PROCEDURE — 85025 COMPLETE CBC W/AUTO DIFF WBC: CPT | Performed by: EMERGENCY MEDICINE

## 2023-02-12 RX ORDER — CEFTRIAXONE 1 G/1
1 INJECTION, POWDER, FOR SOLUTION INTRAMUSCULAR; INTRAVENOUS ONCE
Status: COMPLETED | OUTPATIENT
Start: 2023-02-12 | End: 2023-02-12

## 2023-02-12 RX ORDER — LIDOCAINE HYDROCHLORIDE 20 MG/ML
10 JELLY TOPICAL ONCE
Status: COMPLETED | OUTPATIENT
Start: 2023-02-12 | End: 2023-02-12

## 2023-02-12 RX ORDER — CEPHALEXIN 500 MG/1
500 CAPSULE ORAL 2 TIMES DAILY
Qty: 14 CAPSULE | Refills: 0 | Status: SHIPPED | OUTPATIENT
Start: 2023-02-12 | End: 2023-02-12

## 2023-02-12 RX ORDER — CEPHALEXIN 500 MG/1
500 CAPSULE ORAL 2 TIMES DAILY
Qty: 14 CAPSULE | Refills: 0 | Status: SHIPPED | OUTPATIENT
Start: 2023-02-12 | End: 2023-01-01

## 2023-02-12 RX ADMIN — PHYTONADIONE 2.5 MG: 1 INJECTION, EMULSION INTRAMUSCULAR; INTRAVENOUS; SUBCUTANEOUS at 19:51

## 2023-02-12 RX ADMIN — CEFTRIAXONE SODIUM 1 G: 1 INJECTION, POWDER, FOR SOLUTION INTRAMUSCULAR; INTRAVENOUS at 18:05

## 2023-02-12 RX ADMIN — LIDOCAINE HYDROCHLORIDE 10 ML: 20 JELLY TOPICAL at 16:44

## 2023-02-12 ASSESSMENT — ENCOUNTER SYMPTOMS
VOMITING: 0
HEMATURIA: 1
SHORTNESS OF BREATH: 0
HEADACHES: 0
FEVER: 0
DIARRHEA: 0
ABDOMINAL PAIN: 1
DYSURIA: 0

## 2023-02-12 ASSESSMENT — ACTIVITIES OF DAILY LIVING (ADL)
ADLS_ACUITY_SCORE: 35

## 2023-02-12 NOTE — ED TRIAGE NOTES
Pt has best replaced every month; home health nurse to change best; blood came out with placement of best; nurse was not able to advance best into his bladder without extreme pain; catheter left half way in place witout the balloon inflated. Pt is on blood thinners   Triage Assessment     Row Name 02/12/23 1558       Triage Assessment (Adult)    Airway WDL WDL    Additional Documentation Breath Sounds (Group)       Respiratory WDL    Respiratory WDL WDL       Breath Sounds    Breath Sounds All Fields    All Lung Fields Breath Sounds clear       Skin Circulation/Temperature WDL    Skin Circulation/Temperature WDL WDL       Cardiac WDL    Cardiac WDL WDL       Peripheral/Neurovascular WDL    Peripheral Neurovascular WDL WDL       Cognitive/Neuro/Behavioral WDL    Cognitive/Neuro/Behavioral WDL WDL

## 2023-02-12 NOTE — ED NOTES
Bed: [place] [address]  Expected date:   Expected time:   Means of arrival:   Comments:  Jen- 89yo M Blood in [place]

## 2023-02-12 NOTE — ED PROVIDER NOTES
EMERGENCY DEPARTMENT ENCOUNTER      NAME: Carlos Rubio  AGE: 89 year old male  YOB: 1930  MRN: [id number]  EVALUATION DATE & TIME: 2/12/2023  3:51 PM    PCP: Kyree Bojorquez    ED PROVIDER: Iva Langley M.D.        Chief Complaint   Patient presents with     Urinary Pain     Pt has best replaced every month; home health nurse to change best; blood came out with placement of best; nurse was not able to advance best into his bladder without extreme pain.pt on blood thinners         FINAL IMPRESSION:    1. Acute UTI    2. Hematuria, unspecified type    3. Elevated INR    4. Urinary catheter (Best) change required            MEDICAL DECISION MAKING:    Carlos Rubio is a 89 year old male with history of retention of urine, Sicca Syndrome, KENNEDY, carpal tunnel syndrome, CAD, hypertension, hyperlipidemia, who presents to the ER with complaints of urinary pain.     Patient's original urine in his Best bag was brown in nature without any clots.  The catheter was changed by nursing and now flowing clear rohit urine without clots or gross hematuria.  Creatinine looks good.  Hemoglobin stable.  INR significantly elevated 8.5.  Discussion with patient's that his INR was just 2.9 just 2 days ago.  Son present at bedside states that his INR is extremely labile as it will be subtherapeutic Monday and then supratherapeutic another day.  At this time he has no other complaints and urine is clear.  I discussed with him about doing vitamin K we will do a small dose of 2.5.  I fear the risk of spontaneous bleeding or head bleed if he falls is higher than the risk of stroke from his warfarin at this time.  I do not think he needs admission to the hospital at this time and patient and son at bedside agree.    At this time I feel that he is safe for discharge home.  Urinalysis suggestive of UTI as it is nitrite positive.  Urine culture pending.  We will start him on Keflex at this time and first dose of  ceftriaxone be given through the IV here in the ER.  He has an appointment to see urology in 1 week to consider possible prostate stenting.  Patient otherwise stable at this time and I feel he is safe for discharge home.      ED COURSE:  4:10 PM    I met with the patient to gather history and perform my exam. ED course and treatment discussed.    6:09 PM  The patient's urine looks great.  No longer grossly bloody once the Sanders was replaced by nursing.  INR is significantly elevated but patient and son at bedside states that his INR is extremely labile.  They will be elevated like this and then drop down to subtherapeutic ranges.  He does not have a mechanical heart valve.  He is anticoagulated for A-fib.  I discussed at this time about whether or not to do a dose of vitamin K and we will do 2.5 mg orally.  He is not having any other symptoms to suggest acute spontaneous bleeding either in the head or GI tract.  Patient will contact his INR clinic tomorrow for their recommendations on what he should do for his INR dosing tomorrow.  He will hold his dose tonight for sure.    Urinalysis with possible UTI given that it is nitrite positive and therefore will treat with antibiotics though cultures pending.  He is scheduled to see a urologist in 1 week to discuss about possible putting in a prostate type stent to help with his urinary retention issues.  I think that is a great idea.    Family present at bedside and agree with this plan.  All of their questions have been answered.  Patient also aware of this plan and agrees.      Urine at time of discharge from the ER.          COVID-19 PPE worn during patient evaluation:  Mask: n95 and homemade masks   Eye Protection: goggles   Gown: none   Hair cover: yes  Face shield: none   Patient wearing a mask: yes     At the conclusion of the encounter I discussed the results of all of the tests and the disposition. Their questions were answered. The patient (and any family  "present) acknowledged understanding and were agreeable with the care plan.          CONSULTANTS:  none        MEDICATIONS GIVEN IN THE EMERGENCY:  Medications   cefTRIAXone (ROCEPHIN) 1 g vial to attach to  mL bag for ADULTS or NS 50 mL bag for PEDS (1 g Intravenous New Bag 2/12/23 1805)   phytonadione (MEPHYTON/VITAMIN K) 1 MG/ML oral solution 2.5 mg (has no administration in time range)   lidocaine (XYLOCAINE) 2 % external gel 10 mL (10 mLs Urethral Given 2/12/23 1644)           NEW PRESCRIPTIONS STARTED AT TODAY'S ER VISIT     Medication List      Started    cephALEXin 500 MG capsule  Commonly known as: KEFLEX  500 mg, Oral, 2 TIMES DAILY            CONDITION:  stable        DISPOSITION:  Discharge home with son         =================================================================  =================================================================  TRIAGE ASSESSMENT:    Pt has best replaced every month; home health nurse to change best; blood came out with placement of best; nurse was not able to advance best into his bladder without extreme pain; catheter left half way in place witout the balloon inflated. Pt is on blood thinners   Triage Assessment     Row Name 02/12/23 8520       Triage Assessment (Adult)    Airway WDL WDL    Additional Documentation Breath Sounds (Group)       Respiratory WDL    Respiratory WDL WDL       Breath Sounds    Breath Sounds All Fields    All Lung Fields Breath Sounds clear       Skin Circulation/Temperature WDL    Skin Circulation/Temperature WDL WDL       Cardiac WDL    Cardiac WDL WDL       Peripheral/Neurovascular WDL    Peripheral Neurovascular WDL WDL       Cognitive/Neuro/Behavioral WDL    Cognitive/Neuro/Behavioral WDL WDL                     ED Triage Vitals [02/12/23 8409]   Enc Vitals Group      BP (!) 160/70      Pulse 81      Resp 18      Temp 98.4  F (36.9  C)      Temp src Oral      SpO2 94 %      Weight 78.5 kg (173 lb)      Height 1.727 m (5' 8\")      " Head Circumference       Peak Flow       Pain Score       Pain Loc       Pain Edu?       Excl. in GC?           ================================================================  ================================================================    HPI    Patient information was obtained from: Patient     Use of Intrepreter: N/A      Carlos Rubio is a 89 year old male with history of retention of urine, Sicca Syndrome, KENNEDY, carpal tunnel syndrome, CAD, hypertension, hyperlipidemia, who presents to the ER with complaints of urinary pain.     The patient present with best catheter pain since Friday. He reports catheter replacement every month. Last change was on Friday. He states bloody urine since best catheter change. There was no blood prior to change. The patient states his prostates gets nicked during best catheter change. The patient is unsure if bladder is emptying. He notes INR lab on Friday was 2.9. He endorses some lower abdominal pain. He is on Warfarin for afib. He denies fever, cough, and any other complaints or concerns at the moment.       REVIEW OF SYSTEMS  Review of Systems   Constitutional: Negative for fever.   Respiratory: Negative for shortness of breath.    Cardiovascular: Negative for chest pain.   Gastrointestinal: Positive for abdominal pain (suprapubic fullness). Negative for diarrhea and vomiting.   Genitourinary: Positive for hematuria. Negative for dysuria.   Allergic/Immunologic: Negative for immunocompromised state.   Neurological: Negative for headaches.   All other systems reviewed and are negative.          PAST MEDICAL HISTORY:  Past Medical History:   Diagnosis Date     A-fib (H)     on coumadin     ARF (acute renal failure) (H) 01/01/1979    States both his kidneys quit. Was going to start dialysis when they started working again.     Arthritis      Atrial fibrillation (H)      Basal cell carcinoma      Carpal tunnel syndrome      Congestive heart failure (H)      Diabetes (H)      MUSA SAID HE DOES NOT HAVE DIABETES 12/11/15.     Heart failure (H)      HTN (hypertension)      Psoriasis      Psoriatic arthritis (H)      Sicca syndrome (H)     MUSA SAID HE DOES NOT HAVE/NEVER HAD SICCA SYNDROME 12/11/15.         PAST SURGICAL HISTORY:  Past Surgical History:   Procedure Laterality Date     ARTHROSCOPY KNEE       IR MISCELLANEOUS PROCEDURE  10/27/2008     IR MISCELLANEOUS PROCEDURE  10/27/2008     RELEASE CARPAL TUNNEL Left          CURRENT MEDICATIONS:    Prior to Admission medications    Medication Sig Start Date End Date Taking? Authorizing Provider   bumetanide (BUMEX) 1 MG tablet Take 1 tablet (1 mg) by mouth 2 times daily 10/24/22   Latasha Strong, DO   Cholecalciferol (VITAMIN D3 PO) Take by mouth daily    Unknown, Entered By History   doxazosin (CARDURA) 4 MG tablet Take 2 mg by mouth daily     Reported, Patient   finasteride (PROSCAR) 5 MG tablet Take 5 mg by mouth daily    Reported, Patient   hydrALAZINE (APRESOLINE) 50 MG tablet Take 0.5 tablets (25 mg) by mouth 3 times daily 10/24/22   Latasha Strong DO   metoprolol tartrate (LOPRESSOR) 25 MG tablet TAKE 0.5 TABLETS BY MOUTH 2 TIMES DAILY. 12/12/22   Chase Sewell MD   primidone (MYSOLINE) 50 MG tablet Take 50 mg by mouth 3 times daily    Unknown, Entered By History   simvastatin (ZOCOR) 40 MG tablet Take by mouth At Bedtime    Reported, Patient   spironolactone (ALDACTONE) 25 MG tablet Take 25 mg by mouth daily 1/13/22   Reported, Patient   tamsulosin (FLOMAX) 0.4 MG capsule Take 0.4 mg by mouth 2 times daily 1/18/22   Reported, Patient   triamcinolone (KENALOG) 0.1 % external ointment triamcinolone acetonide 0.1 % topical ointment   PLEASE SEE ATTACHED FOR DETAILED DIRECTIONS    Reported, Patient   warfarin ANTICOAGULANT (COUMADIN) 5 MG tablet Take 0.5 tablets (2.5 mg) by mouth daily 10/25/22   Latasha tSrong DO         ALLERGIES:  Allergies   Allergen Reactions     Bactrim [Sulfamethoxazole W/Trimethoprim]   "        FAMILY HISTORY:  Family History   Problem Relation Age of Onset     Cerebrovascular Disease Mother 76.00     Cerebrovascular Disease Father 81.00     Pacemaker Brother 82.00         SOCIAL HISTORY:  Social History     Socioeconomic History     Marital status:      Spouse name: None     Number of children: None     Years of education: None     Highest education level: None   Tobacco Use     Smoking status: Former     Packs/day: 0.50     Years: 20.00     Pack years: 10.00     Types: Cigarettes     Quit date: 1975     Years since quittin.1     Smokeless tobacco: Never     Tobacco comments:     Smoked a pipe from  to about  (or maybe  - he can't remember)   Substance and Sexual Activity     Alcohol use: Yes     Alcohol/week: 1.0 standard drink     Comment: Alcoholic Drinks/day: Occasionally     Drug use: No         VITALS:  Patient Vitals for the past 24 hrs:   BP Temp Temp src Pulse Resp SpO2 Height Weight   23 1800 125/61 -- -- 76 -- 96 % -- --   23 1700 124/59 -- -- 93 -- 92 % -- --   23 1600 (!) 147/74 -- -- 92 -- 95 % -- --   23 1555 (!) 160/70 98.4  F (36.9  C) Oral 81 18 94 % 1.727 m (5' 8\") 78.5 kg (173 lb)       Wt Readings from Last 3 Encounters:   23 78.5 kg (173 lb)   23 79.8 kg (176 lb)   22 80.7 kg (178 lb)       Estimated Creatinine Clearance: 47.6 mL/min (based on SCr of 1.1 mg/dL).    PHYSICAL EXAM    Constitutional:  Well developed, Well nourished, NAD, GCS 15  HENT:  Normocephalic, Atraumatic, Bilateral external ears normal, Nose normal. Neck- Supple, No stridor.  Eyes:  PERRL, EOMI, Conjunctiva normal, No discharge.  Respiratory:  Normal breath sounds, No respiratory distress, No wheezing, Speaks full sentences easily. No cough.   Cardiovascular:  Normal heart rate, Regular rhythm, No rubs, No gallops.   GI:  No excessive obesity.  Bowel sounds normal, Soft, No tenderness, No masses, No flank tenderness. No rebound or " guarding.   : best with dark brown urine in original best bag and tubing  Musculoskeletal: No cyanosis, No clubbing. Good range of motion in all major joints. No major deformities noted.   Integument:  Warm, Dry, No erythema, No rash.  No petechiae.   Neurologic:  Alert & oriented x 3  Psychiatric:  Affect normal, Cooperative         LAB:  All pertinent labs reviewed and interpreted.  Recent Results (from the past 24 hour(s))   Basic metabolic panel    Collection Time: 02/12/23  4:44 PM   Result Value Ref Range    Sodium 135 (L) 136 - 145 mmol/L    Potassium 3.4 3.4 - 5.3 mmol/L    Chloride 99 98 - 107 mmol/L    Carbon Dioxide (CO2) 26 22 - 29 mmol/L    Anion Gap 10 7 - 15 mmol/L    Urea Nitrogen 19.6 8.0 - 23.0 mg/dL    Creatinine 1.10 0.67 - 1.17 mg/dL    Calcium 8.9 8.2 - 9.6 mg/dL    Glucose 123 (H) 70 - 99 mg/dL    GFR Estimate 63 >60 mL/min/1.73m2   INR    Collection Time: 02/12/23  4:44 PM   Result Value Ref Range    INR 8.54 (HH) 0.85 - 1.15   PTT    Collection Time: 02/12/23  4:44 PM   Result Value Ref Range    aPTT 74 (H) 22 - 38 Seconds   CBC with platelets and differential    Collection Time: 02/12/23  4:44 PM   Result Value Ref Range    WBC Count 12.3 (H) 4.0 - 11.0 10e3/uL    RBC Count 3.86 (L) 4.40 - 5.90 10e6/uL    Hemoglobin 12.3 (L) 13.3 - 17.7 g/dL    Hematocrit 37.2 (L) 40.0 - 53.0 %    MCV 96 78 - 100 fL    MCH 31.9 26.5 - 33.0 pg    MCHC 33.1 31.5 - 36.5 g/dL    RDW 12.7 10.0 - 15.0 %    Platelet Count 226 150 - 450 10e3/uL    % Neutrophils 86 %    % Lymphocytes 6 %    % Monocytes 7 %    % Eosinophils 0 %    % Basophils 0 %    % Immature Granulocytes 1 %    NRBCs per 100 WBC 0 <1 /100    Absolute Neutrophils 10.6 (H) 1.6 - 8.3 10e3/uL    Absolute Lymphocytes 0.7 (L) 0.8 - 5.3 10e3/uL    Absolute Monocytes 0.9 0.0 - 1.3 10e3/uL    Absolute Eosinophils 0.0 0.0 - 0.7 10e3/uL    Absolute Basophils 0.0 0.0 - 0.2 10e3/uL    Absolute Immature Granulocytes 0.1 <=0.4 10e3/uL    Absolute NRBCs 0.0  10e3/uL   UA with Microscopic reflex to Culture    Collection Time: 02/12/23  4:52 PM    Specimen: Urine, Sanders Catheter   Result Value Ref Range    Color Urine Orange (A) Colorless, Straw, Light Yellow, Yellow    Appearance Urine Cloudy (A) Clear    Glucose Urine Negative Negative mg/dL    Bilirubin Urine Negative Negative    Ketones Urine Negative Negative mg/dL    Specific Gravity Urine 1.019 1.001 - 1.030    Blood Urine >1.0 mg/dL (A) Negative    pH Urine 5.5 5.0 - 7.0    Protein Albumin Urine 100 (A) Negative mg/dL    Urobilinogen Urine <2.0 <2.0 mg/dL    Nitrite Urine Positive (A) Negative    Leukocyte Esterase Urine 500 Yris/uL (A) Negative    Bacteria Urine Many (A) None Seen /HPF    WBC Clumps Urine Present (A) None Seen /HPF    Mucus Urine Present (A) None Seen /LPF    RBC Urine >182 (H) <=2 /HPF    WBC Urine >182 (H) <=5 /HPF       No results found for: ABORH        RADIOLOGY:  Reviewed all pertinent imaging. Please see official radiology report.    No orders to display         EKG:    none    PROCEDURES:  Sanders replaced by RN      Medical Decision Making    History:    Supplemental history from: Documented in chart, if applicable    External Record(s) reviewed: Documented in chart, if applicable.    Work Up:    Chart documentation includes differential considered and any EKGs or imaging independently interpreted by provider, where specified.    In additional to work up documented, I considered the following work up: Documented in chart, if applicable.    External consultation:    Discussion of management with another provider: Documented in chart, if applicable    Complicating factors:    Care impacted by chronic illness: Anticoagulated State    Care affected by social determinants of health: N/A    Disposition considerations: Discharge. I prescribed additional prescription strength medication(s) as charted. I considered admission, but ultimately discharged patient But ultimately his urine looks great  with Sanders replacement.  No active bleeding.  See photo in the upper part of chart for the current urine at time of discharge..        I, Mercedes Her, am serving as a scribe to document services personally performed by Dr. Iva Langley based on my observation and the provider's statements to me. I, Dr. Iva Langley MD attest that Mercedes Her is acting in a scribe capacity, has observed my performance of the services and has documented them in accordance with my direction.        Iva Langley M.D. [facility]  Emergency Medicine and Medical Toxicology  Formerly [facility] EMERGENCY DEPARTMENT  [facility] [facility] [id number] [phone]  Dept: [phone]           Iva Langley MD  02/12/23 5888

## 2023-02-13 NOTE — DISCHARGE INSTRUCTIONS
Your Sanders catheter is now draining beautifully.  The urine looks great.  The urine that was sent to the lab does like possible urine infection therefore we will start you on Keflex.  This medication was sent to your pharmacy and you should start taking this in the morning.  You received a dose through the IV overnight tonight in the ER and this will cover you through the night.    Keep your appointment to see the urologist for next week.    Your INR was 8.54 today.  Do not take your warfarin dose tonight.  Contact your INR clinic tomorrow before you take any additional warfarin tomorrow.    Return the emergency department if you develop bloody urine, blood clots in the urine, fever, abdominal pain, headache, black or bloody stools, or any other concerns.    Thank you for choosing [facility].  It has been my pleasure caring for you today.     ~Dr. Shivam MD

## 2023-02-14 LAB — BACTERIA UR CULT: ABNORMAL

## 2023-04-05 ENCOUNTER — TELEPHONE (OUTPATIENT)
Dept: INTERVENTIONAL RADIOLOGY/VASCULAR | Facility: HOSPITAL | Age: 88
End: 2023-04-05
Payer: COMMERCIAL

## 2023-04-13 ENCOUNTER — HOSPITAL ENCOUNTER (OUTPATIENT)
Dept: INTERVENTIONAL RADIOLOGY/VASCULAR | Facility: HOSPITAL | Age: 88
Discharge: HOME | End: 2023-04-13
Attending: UROLOGY | Admitting: RADIOLOGY
Payer: COMMERCIAL

## 2023-04-13 VITALS
SYSTOLIC BLOOD PRESSURE: 143 MMHG | RESPIRATION RATE: 16 BRPM | HEART RATE: 48 BPM | HEIGHT: 68 IN | BODY MASS INDEX: 26.22 KG/M2 | TEMPERATURE: 98.2 F | WEIGHT: 173 LBS | OXYGEN SATURATION: 95 % | DIASTOLIC BLOOD PRESSURE: 67 MMHG

## 2023-04-13 DIAGNOSIS — R33.9 RETENTION OF URINE, UNSPECIFIED: ICD-10-CM

## 2023-04-13 DIAGNOSIS — R33.9 URINARY RETENTION: Primary | ICD-10-CM

## 2023-04-13 LAB
HGB BLD-MCNC: 12.2 G/DL (ref 13.3–17.7)
INR PPP: 1.46 (ref 0.85–1.15)
PLATELET # BLD AUTO: 239 10E3/UL (ref 150–450)

## 2023-04-13 PROCEDURE — 999N000038 IR SUPRAPUBIC CATHETER PLACMENT

## 2023-04-13 PROCEDURE — 255N000002 HC RX 255 OP 636: Mod: 59 | Performed by: UROLOGY

## 2023-04-13 PROCEDURE — C1729 CATH, DRAINAGE: HCPCS

## 2023-04-13 PROCEDURE — C1769 GUIDE WIRE: HCPCS

## 2023-04-13 PROCEDURE — 36415 COLL VENOUS BLD VENIPUNCTURE: CPT | Performed by: PHYSICIAN ASSISTANT

## 2023-04-13 PROCEDURE — 85049 AUTOMATED PLATELET COUNT: CPT | Performed by: PHYSICIAN ASSISTANT

## 2023-04-13 PROCEDURE — 250N000011 HC RX IP 250 OP 636

## 2023-04-13 PROCEDURE — 250N000009 HC RX 250

## 2023-04-13 PROCEDURE — 250N000011 HC RX IP 250 OP 636: Performed by: PHYSICIAN ASSISTANT

## 2023-04-13 PROCEDURE — 85610 PROTHROMBIN TIME: CPT | Performed by: PHYSICIAN ASSISTANT

## 2023-04-13 PROCEDURE — 272N000569 HC SHEATH CR6

## 2023-04-13 PROCEDURE — 51102 DRAIN BL W/CATH INSERTION: CPT

## 2023-04-13 PROCEDURE — 272N000500 HC NEEDLE CR2

## 2023-04-13 PROCEDURE — 250N000013 HC RX MED GY IP 250 OP 250 PS 637: Performed by: RADIOLOGY

## 2023-04-13 PROCEDURE — 85018 HEMOGLOBIN: CPT | Performed by: PHYSICIAN ASSISTANT

## 2023-04-13 RX ORDER — FENTANYL CITRATE 50 UG/ML
25-50 INJECTION, SOLUTION INTRAMUSCULAR; INTRAVENOUS EVERY 5 MIN PRN
Status: DISCONTINUED | OUTPATIENT
Start: 2023-04-13 | End: 2023-04-14 | Stop reason: HOSPADM

## 2023-04-13 RX ORDER — LIDOCAINE 40 MG/G
CREAM TOPICAL
Status: DISCONTINUED | OUTPATIENT
Start: 2023-04-13 | End: 2023-04-14 | Stop reason: HOSPADM

## 2023-04-13 RX ORDER — NALOXONE HYDROCHLORIDE 0.4 MG/ML
0.2 INJECTION, SOLUTION INTRAMUSCULAR; INTRAVENOUS; SUBCUTANEOUS
Status: DISCONTINUED | OUTPATIENT
Start: 2023-04-13 | End: 2023-04-14 | Stop reason: HOSPADM

## 2023-04-13 RX ORDER — CEFTRIAXONE 1 G/1
1 INJECTION, POWDER, FOR SOLUTION INTRAMUSCULAR; INTRAVENOUS
Status: COMPLETED | OUTPATIENT
Start: 2023-04-13 | End: 2023-04-13

## 2023-04-13 RX ORDER — NALOXONE HYDROCHLORIDE 0.4 MG/ML
0.4 INJECTION, SOLUTION INTRAMUSCULAR; INTRAVENOUS; SUBCUTANEOUS
Status: DISCONTINUED | OUTPATIENT
Start: 2023-04-13 | End: 2023-04-14 | Stop reason: HOSPADM

## 2023-04-13 RX ORDER — SODIUM CHLORIDE 9 MG/ML
INJECTION, SOLUTION INTRAVENOUS CONTINUOUS
Status: DISCONTINUED | OUTPATIENT
Start: 2023-04-13 | End: 2023-04-14 | Stop reason: HOSPADM

## 2023-04-13 RX ORDER — FLUMAZENIL 0.1 MG/ML
0.2 INJECTION, SOLUTION INTRAVENOUS
Status: DISCONTINUED | OUTPATIENT
Start: 2023-04-13 | End: 2023-04-14 | Stop reason: HOSPADM

## 2023-04-13 RX ORDER — ACETAMINOPHEN 650 MG/1
650 SUPPOSITORY RECTAL ONCE
Status: COMPLETED | OUTPATIENT
Start: 2023-04-13 | End: 2023-04-13

## 2023-04-13 RX ORDER — ACETAMINOPHEN 325 MG/1
650 TABLET ORAL ONCE
Status: COMPLETED | OUTPATIENT
Start: 2023-04-13 | End: 2023-04-13

## 2023-04-13 RX ADMIN — ACETAMINOPHEN 650 MG: 325 TABLET ORAL at 16:11

## 2023-04-13 RX ADMIN — MIDAZOLAM HYDROCHLORIDE 2 MG: 1 INJECTION, SOLUTION INTRAMUSCULAR; INTRAVENOUS at 14:48

## 2023-04-13 RX ADMIN — IOHEXOL 10 ML: 350 INJECTION, SOLUTION INTRAVENOUS at 15:14

## 2023-04-13 RX ADMIN — CEFTRIAXONE SODIUM 1 G: 1 INJECTION, POWDER, FOR SOLUTION INTRAMUSCULAR; INTRAVENOUS at 14:50

## 2023-04-13 RX ADMIN — LIDOCAINE HYDROCHLORIDE 10 ML: 10 INJECTION, SOLUTION INFILTRATION; PERINEURAL at 15:03

## 2023-04-13 RX ADMIN — DIATRIZOATE MEGLUMINE 50 ML: 180 INJECTION, SOLUTION INTRAVESICAL at 15:21

## 2023-04-13 NOTE — IP AVS SNAPSHOT
[facility] Interventional Radiology  [address] [place] [address]  Phone: [phone]  Fax: [phone]                              After Visit Summary   4/13/2023    Carlos Rubio   MRN: [id number]           After Visit Summary Signature Page    I have received my discharge instructions, and my questions have been answered. I have discussed any challenges I see with this plan with the nurse or doctor.    ..........................................................................................................................................  Patient/Patient Representative Signature      ..........................................................................................................................................  Patient Representative Print Name and Relationship to Patient    ..................................................               ................................................  Date                                   Time    ..........................................................................................................................................  Reviewed by Signature/Title    ...................................................              ..............................................  Date                                               Time    22EPIC Rev 08/18

## 2023-04-13 NOTE — PROGRESS NOTES
Interventional Radiology - History and Physical  Outpatient - [facility]  04/13/2023     Procedure Requested: Suprapubic catheter placement  Requesting Provider: Herb Roa MD    HPI: Musa Rubio is a 89 year old male with a PMH of atrial fibrillation, arthritis, CHF, HTN, HLD, DM II, psoriatic arthritis, BPH, and urinary retention who presents for a suprapubic catheter placement. Originally developed urinary retention 10/2022 and has failed multiple voiding trials. Urodynamic studies demonstrated small capacity non-compliant bladder. He currently has a chronic best catheter in place. He was presented with several options for management of retention and has decided he would like SP catheter placement.    Initial exchange to be done with IR in 4-6 weeks, followed by subsequent exchanges with home care.    Imaging:   None recent or relevant    NPO Status: Midnight  Anticoagulation/Antiplatelets/Bleeding tendencies: Warfarin daily, recommend INR <1.8 for procedure, INR today 1.46.  Antibiotics: Rocephin 1 g    Review of Systems: A comprehensive 10-point review of systems was performed. All systems were reviewed and negative with exception to those reported in the HPI.    PMH:  Past Medical History:   Diagnosis Date     A-fib (H)     on coumadin     ARF (acute renal failure) (H) 01/01/1979    States both his kidneys quit. Was going to start dialysis when they started working again.     Arthritis      Atrial fibrillation (H)      Basal cell carcinoma      Carpal tunnel syndrome      Congestive heart failure (H)      Diabetes (H)     MUSA SAID HE DOES NOT HAVE DIABETES 12/11/15.     Heart failure (H)      HTN (hypertension)      Psoriasis      Psoriatic arthritis (H)      Sicca syndrome (H)     MUSA SAID HE DOES NOT HAVE/NEVER HAD SICCA SYNDROME 12/11/15.       PSH:  Past Surgical History:   Procedure Laterality Date     ARTHROSCOPY KNEE       IR MISCELLANEOUS PROCEDURE   "10/27/2008     IR MISCELLANEOUS PROCEDURE  10/27/2008     RELEASE CARPAL TUNNEL Left        ALLERGIES:  Allergies   Allergen Reactions     Bactrim [Sulfamethoxazole W/Trimethoprim]        MEDICATIONS:  Current Outpatient Medications   Medication     bumetanide (BUMEX) 1 MG tablet     cephALEXin (KEFLEX) 500 MG capsule     Cholecalciferol (VITAMIN D3 PO)     doxazosin (CARDURA) 4 MG tablet     finasteride (PROSCAR) 5 MG tablet     hydrALAZINE (APRESOLINE) 50 MG tablet     metoprolol tartrate (LOPRESSOR) 25 MG tablet     primidone (MYSOLINE) 50 MG tablet     simvastatin (ZOCOR) 40 MG tablet     spironolactone (ALDACTONE) 25 MG tablet     tamsulosin (FLOMAX) 0.4 MG capsule     triamcinolone (KENALOG) 0.1 % external ointment     warfarin ANTICOAGULANT (COUMADIN) 5 MG tablet     Current Facility-Administered Medications   Medication     cefTRIAXone (ROCEPHIN) 1 g vial to attach to  mL bag for ADULTS or NS 50 mL bag for PEDS     lidocaine (LMX4) cream     lidocaine 1 % 0.1-1 mL     sodium chloride (PF) 0.9% PF flush 3 mL     sodium chloride (PF) 0.9% PF flush 3 mL     sodium chloride 0.9% infusion         LABS:  INR   Date Value Ref Range Status   04/13/2023 1.46 (H) 0.85 - 1.15 Final      Hemoglobin   Date Value Ref Range Status   04/13/2023 12.2 (L) 13.3 - 17.7 g/dL Final     Platelet Count   Date Value Ref Range Status   04/13/2023 239 150 - 450 10e3/uL Final     Creatinine   Date Value Ref Range Status   02/12/2023 1.10 0.67 - 1.17 mg/dL Final     Potassium   Date Value Ref Range Status   02/12/2023 3.4 3.4 - 5.3 mmol/L Final   04/13/2022 4.6 3.5 - 5.0 mmol/L Final         EXAM:  BP (!) 154/76 (BP Location: Right arm)   Pulse 63   Temp 98.6  F (37  C) (Oral)   Resp 20   Ht 1.727 m (5' 8\")   Wt 78.5 kg (173 lb)   SpO2 97%   BMI 26.30 kg/m    General:  Stable.  In no acute distress.    Neuro:  A&O x 3. Moves all extremities equally.  Resp:  Lungs clear to auscultation bilaterally.  Cardio:  S1S2 without " murmur, clicks or rubs. A-fib per monitor.  Skin:  Without excoriations, ecchymosis, erythema, lesions or open sores on abdomen. Best in place, clamped.    Pre-Sedation Assessment:  Mallampati Airway Classification:  II - Faucial pillars and soft palate may be seen, but uvula is masked by the base of the tongue  Previous reaction to anesthesia/sedation:  No  Sedation plan based on assessment: Moderate (conscious) sedation  ASA Classification: Class 3 - SEVERE SYSTEMIC DISEASE, DEFINITE FUNCTIONAL LIMITATIONS.   Code Status: FULL CODE      ASSESSMENT:  89 year old male with a PMH of atrial fibrillation, arthritis, CHF, HTN, HLD, DM II, psoriatic arthritis, BPH, and urinary retention who presents for a suprapubic catheter placement. Failed multiple voiding trials, currently has a chronic best catheter in place. Requesting SP catheter placement.    PLAN:    Suprapubic catheter placement with sedation.    Initial exchange to be done with IR in 4-6 weeks, followed by subsequent exchanges with home care.    Procedural education reviewed with patient/family in detail including, but not limited to risks, benefits and alternatives with understanding verbalized by patient/family.    Total time spent on the date of the encounter: 45 minutes.      SAMREEN ELLER [facility]  Interventional Radiology  [phone]

## 2023-04-13 NOTE — DISCHARGE INSTRUCTIONS
Suprapubic Catheter Discharge Instructions:  You had a suprapubic catheter (SP) tube placed. An SP tube is a type of urinary catheter (thin, flexible tube) that is placed through a small incision in your lower abdomen into your bladder to drain urine from your bladder into a bag outside of your body. Please follow the below instructions:    Care instructions:  - If you received sedation for your procedure, do not drive or operate heavy machinery for the rest of the day (24 hours).   - Avoid stagnant water such as tub baths, Jacuzzis and pools.  - You may shower beginning the day after your suprapubic catheter was placed. Clean around  the SP tube exit site with soap and water and pat dry under disk.    - Change gauze dressing around suprapubic catheter site daily or as needed to keep the site clean and dry.    - Positioning of the drainage bag and tubing:  *Allow gravity drainage: Do not loop or kink the tubing so urine can flow.  *Keep the drainage bag below the level of your waist.      Follow up:  - Recommend routine exchanges every 4-6 weeks or as recommended by your urologist (either with urology, care facility staff or with [facility]).    - If exchange with [facility] is desired, please call [phone]*** to schedule this appointment.      Call [facility] ([phone])*** if you experience the following:  - Suprapubic tube outputs suddenly stop or significantly decrease.  - Your suprapubic tube appears to be falling out, has fallen out, becomes clogged or breaks.   - Significant leaking around the suprapubic tube exit site.    Please seek medical evaluation for:    - Fever (greater than 101 F (38.3C)) and/or chills.  - Purulent (yellow/green/foul smelling) drainage from the suprapubic tube exit site.   - Significant bleeding at the suprapubic tube exit site.   - Significant or worsening pain at the suprapubic tube exit site or abdomen.

## 2023-04-13 NOTE — PRE-PROCEDURE
GENERAL PRE-PROCEDURE:   Procedure:  SP catheter placement  Date/Time:  4/13/2023 12:14 PM    Written consent obtained?: Yes    Risks and benefits: Risks, benefits and alternatives were discussed    Consent given by:  Patient  Patient states understanding of procedure being performed: Yes    Patient's understanding of procedure matches consent: Yes    Procedure consent matches procedure scheduled: Yes    Expected level of sedation:  Moderate  Appropriately NPO:  Yes  ASA Class:  3  Mallampati  :  Grade 2- soft palate, base of uvula, tonsillar pillars, and portion of posterior pharyngeal wall visible  Lungs:  Lungs clear with good breath sounds bilaterally  Heart:  Normal heart sounds and rate and a-fib  History & Physical reviewed:  History and physical reviewed and no updates needed  Statement of review:  I have reviewed the lab findings, diagnostic data, medications, and the plan for sedation

## 2023-04-17 ENCOUNTER — TELEPHONE (OUTPATIENT)
Dept: INTERVENTIONAL RADIOLOGY/VASCULAR | Facility: HOSPITAL | Age: 88
End: 2023-04-17
Payer: COMMERCIAL

## 2023-04-17 NOTE — TELEPHONE ENCOUNTER
Attempted IR post-procedure call, no answer. LM encouraging call back if any questions, concerns, or issues post-procedure. IR nurse line given.    Post call completed.   April 17, 2023 2:33 PM  Irene Cullen RN  Interventional Radiology  [phone]

## 2023-04-27 ENCOUNTER — TELEPHONE (OUTPATIENT)
Dept: CARDIOLOGY | Facility: CLINIC | Age: 88
End: 2023-04-27
Payer: COMMERCIAL

## 2023-04-27 NOTE — TELEPHONE ENCOUNTER
Called back Kaylin to discuss physician coordination. Dr. Sewell is out of the office until next week. Dr. Bojorquez wanted to have physician to physician communcation but did not want another cardiologist paged out. Kaylin did not know what it pertained to. She will let him know and requests that this be coordinated on Tuesday of next week. -[facility]

## 2023-04-27 NOTE — TELEPHONE ENCOUNTER
[facility] Call Center    Phone Message    May a detailed message be left on voicemail: yes     Reason for Call: Other: Kaylin with [facility] called in stating one of their providers would like to speak with Dr. Sewell to discuss some medication and the pt. Please reach out to [phone].     Action Taken: Other: Cardiology    Travel Screening: Not Applicable     Thank you!  [facility]

## 2023-05-02 NOTE — TELEPHONE ENCOUNTER
Called [facility] and spoke with [facility] there and confirmed that Dr. Bojorquez is in today and still wishes to speak to Dr. Sewell regarding this patient. Will route. -[facility]          Dr. Sewell,  Dr. Bojorquez with [facility] would like to speak to you today when you have time. If you call [phone], this will reach a direct provider line and they will grab Dr. Bojorquez out of a room if needed. Just when you have time and it is in regards to medication. That is all the coordinator knew.   Thanks,  Mal

## 2023-05-02 NOTE — TELEPHONE ENCOUNTER
----- Message -----  From: Chase Sewell MD  Sent: 5/2/2023   1:14 PM CDT  To: Codie Nicolas RN    Thank you, spoke with the doctor.  LF        ==noted, addressed. -[facility]

## 2023-05-09 NOTE — TELEPHONE ENCOUNTER
INTERVENTIONAL RADIOLOGY INSTRUCTIONS     You are scheduled for an upcoming procedure in the   Interventional Radiology Department at [facility].     Date:  Thursday May 25     Procedure: Suprapubic catheter exchange     Address: [facility] [address] [place], [place] [address]     Free parking is available for patients & visitors in Lot A or B.  Lot A is closest to the main entrance of \[facility]\"".    Check in at main entrance WELCOME DESK.        Check into the Radiology Department at: 11:00 am       Two visitors may accompany you to your procedure.      Please bring list of current medications to your appointment.      Please take all of your medications as prescribed, unless you are contacted by a nurse from our department to hold them.    Please confirm that you have received these instructions by replying to this [employer] message, or if you have any questions, please call the Interventional Radiology team at [phone].       Thank you!    Gladys BARDALES  Interventional Radiology Intake Nurse Coordinator  [phone]

## 2023-05-15 NOTE — TELEPHONE ENCOUNTER
Writer has spoken with Carlos regarding planned procedure with IR via telephone.      Carlos acknowledges understanding of pre-procedure instructions.         I have provided Carlos with IR number ([phone]) for questions or concerns.    Gladys BARDALES  Interventional Radiology RN   [phone]

## 2023-05-23 NOTE — PROGRESS NOTES
Interventional Radiology - Pre-Procedure Note:  Outpatient - [facility]  05/25/2023     Procedure Requested: SP catheter exchange  Requested by: Aranza Gonzales NP    History and Physical Reviewed:  I have personally reviewed the patient's medical history and have updated the medical record as necessary.    Brief HPI:  Musa Rubio is a 89 year old male with a PMH of atrial fibrillation, arthritis, CHF, HTN, HLD, DM II, psoriatic arthritis, BPH, and urinary retention who presents for a suprapubic catheter placement. Originally developed urinary retention 10/2022 and has failed multiple voiding trials. Urodynamic studies demonstrated small capacity non-compliant bladder. He currently has a chronic best catheter in place. He is s/p  Supra pubic catheter placement 4/13/2023       Past Medical History:   Diagnosis Date     A-fib (H)     on coumadin     ARF (acute renal failure) (H) 01/01/1979    States both his kidneys quit. Was going to start dialysis when they started working again.     Arthritis      Atrial fibrillation (H)      Basal cell carcinoma      Carpal tunnel syndrome      Congestive heart failure (H)      Diabetes (H)     [facility] SAID HE DOES NOT HAVE DIABETES 12/11/15.     Heart failure (H)      HTN (hypertension)      Psoriasis      Psoriatic arthritis (H)      Sicca syndrome (H)     [facility] SAID HE DOES NOT HAVE/NEVER HAD SICCA SYNDROME 12/11/15.       Past Surgical History:   Procedure Laterality Date     ARTHROSCOPY KNEE       IR MISCELLANEOUS PROCEDURE  10/27/2008     IR MISCELLANEOUS PROCEDURE  10/27/2008     IR SUPRAPUBIC CATHETER PLACMENT  4/13/2023     RELEASE CARPAL TUNNEL Left      IMAGING:  LOCATION: [facility]  DATE: 4/13/2023     PROCEDURE: SUPRAPUBIC CATHETER PLACEMENT  1. Ultrasound-guided percutaneous access into the urinary bladder.  2. Placement of a suprapubic catheter.  3. Moderate sedation.     INTERVENTIONAL RADIOLOGIST:  Kendrick Ulloa MD     INDICATION: Urinary retention, plan for suprapubic catheter placement..     CONSENT: The risks, benefits and alternatives of the procedure were discussed with the patient or representative in detail. All questions were answered. Informed consent was given to proceed with the procedure.     MODERATE SEDATION: Versed 2 mg IV; Fentanyl 0 mcg IV. During the time out, immediately prior to the administration of medications, the patient was reassessed for adequacy to receive conscious sedation.  Under physician supervision, Versed and fentanyl   were administered for moderate sedation. Pulse oximetry, heart rate and blood pressure were continuously monitored by an independent trained observer. The physician spent 15 minutes of face-to-face sedation time with the patient.     CONTRAST: 10 mL Omnipaque intraurinary.     FLUOROSCOPIC TIME: 0.7 minutes.  RADIATION DOSE: Air Kerma: 20 mGy.     COMPLICATIONS: No immediate complications.     UNIVERSAL PROTOCOL: The operative site was marked and any prior imaging was reviewed. Required items including blood products, implants, devices and special equipment was made available. Patient identity was confirmed either verbally, with demographic   information, hospital assigned identification or other identification markers. A timeout was performed immediately prior to the procedure.     STERILE BARRIER TECHNIQUE: Maximum sterile barrier technique was used. Cutaneous antisepsis was performed at the operative site with application of 2% chlorhexidine and large sterile drape. Prior to the procedure, the  and assistant performed   hand hygiene and wore hat, mask, sterile gown, and sterile gloves during the entire procedure.     PROCEDURE:    Under real-time sonographic guidance, suprapubic percutaneous access was achieved into the urinary bladder. Intrabladder positioning was confirmed with an injection of a small amount of contrast. A wire was placed through  the needle and coiled in the   urinary bladder. Following serial dilatation, a 16 [place]-tip suprapubic catheter was placed. The retention balloon was inflated with saline and a tiny amount of contrast. A post placement injection was performed. The catheter was placed to   gravity drainage.     FINDINGS:  The ultrasound demonstrates mild to moderate bladder distention. At the completion of the study, a suprapubic catheter tip lies within the urinary bladder.                                                                      IMPRESSION:    Suprapubic catheter placement, as discussed above.       NPO: not NPO  ANTICOAGULANTS: coumadin  ANTIBIOTICS: NA    ALLERGIES  Allergies   Allergen Reactions     Bactrim [Sulfamethoxazole-Trimethoprim]          LABS:  INR   Date Value Ref Range Status   04/13/2023 1.46 (H) 0.85 - 1.15 Final      Hemoglobin   Date Value Ref Range Status   04/13/2023 12.2 (L) 13.3 - 17.7 g/dL Final     Platelet Count   Date Value Ref Range Status   04/13/2023 239 150 - 450 10e3/uL Final     Creatinine   Date Value Ref Range Status   02/12/2023 1.10 0.67 - 1.17 mg/dL Final     Potassium   Date Value Ref Range Status   02/12/2023 3.4 3.4 - 5.3 mmol/L Final   04/13/2022 4.6 3.5 - 5.0 mmol/L Final         EXAM:  BP (!) 162/72   Pulse (!) 49   Resp 18   SpO2 95%   General:  Stable.  In no acute distress.    Neuro:  A&O x 3. Moves all extremities equally.  Resp:  Lungs clear to auscultation bilaterally.  Cardio:  S1S2 and reg, without murmur, clicks or rubs  Abdomen:  Soft, non-distended, non-tender.  Skin:  Without excoriations, ecchymosis, erythema, lesions or open sores on around SP site. Clear yellow urine.   MSK:  No gross motor weakness.  Sensation intact.  Proprioception intact.    Pre-Sedation Assessment:  ASA Classification: Class 3 - SEVERE SYSTEMIC DISEASE, DEFINITE FUNCTIONAL LIMITATIONS.   Code Status: partial code Ok for CPR/DNI intra procedure, per discussion with patient.          ASSESSMENT/PLAN:   89 year old male with a PMH of atrial fibrillation, arthritis, CHF, HTN, HLD, DM II, psoriatic arthritis, BPH, and urinary retention who presents for a suprapubic catheter placement.  S/p SP catheter placement 4/13/23.    SP catheter exchange with fentanyl as needed.      Procedural education reviewed with patient/family in detail including, but not limited to risks, benefits and alternatives with understanding verbalized by patient/family.    Total time spent on the date of the encounter: 20 minutes.      SAMREEN ACEVEDO CNP  Interventional Radiology

## 2023-05-25 NOTE — DISCHARGE INSTRUCTIONS
Suprapubic Catheter Discharge Instructions:  You had a suprapubic catheter (SP) tube placed. An SP tube is a type of urinary catheter (thin, flexible tube) that is placed through a small incision in your lower abdomen into your bladder to drain urine from your bladder into a bag outside of your body. Please follow the below instructions:    Care instructions:  - If you received sedation for your procedure, do not drive or operate heavy machinery for the rest of the day (24 hours).   - Avoid stagnant water such as tub baths, Jacuzzis and pools.  - You may shower beginning the day after your suprapubic catheter was placed. Clean around  the SP tube exit site with soap and water and pat dry under disk.    - Change gauze dressing around suprapubic catheter site daily or as needed to keep the site clean and dry.    - Positioning of the drainage bag and tubing:  *Allow gravity drainage: Do not loop or kink the tubing so urine can flow.  *Keep the drainage bag below the level of your waist.      Follow up:  - Recommend routine exchanges every 4-6 weeks or as recommended by your urologist (either with urology, care facility staff or with [facility]).    - If exchange with [facility] is desired, please call [phone] to schedule this appointment.      Call [facility] ([phone]) if you experience the following:  - Suprapubic tube outputs suddenly stop or significantly decrease.  - Your suprapubic tube appears to be falling out, has fallen out, becomes clogged or breaks.   - Significant leaking around the suprapubic tube exit site.    Please seek medical evaluation for:    - Fever (greater than 101 F (38.3C)) and/or chills.  - Purulent (yellow/green/foul smelling) drainage from the suprapubic tube exit site.   - Significant bleeding at the suprapubic tube exit site.   - Significant or worsening pain at the suprapubic tube exit site or abdomen.

## 2023-05-25 NOTE — IP AVS SNAPSHOT
[facility] Interventional Radiology  [address] [place] [address]  Phone: [phone]  Fax: [phone]                              After Visit Summary   5/25/2023    Carlos Rubio   MRN: [id number]           After Visit Summary Signature Page    I have received my discharge instructions, and my questions have been answered. I have discussed any challenges I see with this plan with the nurse or doctor.    ..........................................................................................................................................  Patient/Patient Representative Signature      ..........................................................................................................................................  Patient Representative Print Name and Relationship to Patient    ..................................................               ................................................  Date                                   Time    ..........................................................................................................................................  Reviewed by Signature/Title    ...................................................              ..............................................  Date                                               Time    22EPIC Rev 08/18

## 2023-10-05 NOTE — LETTER
10/5/2023    Kyree Bojorquez MD  [address] [address]    RE: Carlos Rubio       Dear Colleague,     I had the pleasure of seeing Carlos Rubio in the [facility].      [facility]  Heart Care Clinic Follow-up Note    Assessment & Plan        (I50.33) Acute on chronic diastolic congestive heart failure (H)  (primary encounter diagnosis)  Comment: Acute on chronic in the setting of preserved ejection fraction of 55 to 60%, currently asymptomatic now with Bumex 1 mg by mouth twice a day.  Renal profile being checked every 6 months by primary.    (I10) Benign essential hypertension  Comment: Blood pressure under good control today if anything a little bit on the high side.  Taking hydralazine 12-1/2 mg by mouth 3 times a day, cut that down to twice a day.  Metoprolol 12.5 mg by mouth twice a day, has been changed to propranolol 20 mg twice daily to help with tremors, no longer on alpha blockers.  We will continue to monitor, he gets that checked weekly.  If runs higher we will need to go back to 3 times daily on the hydralazine.    (I48.19) Persistent atrial fibrillation (H)  Comment: Permanent, asymptomatic, not valvular and on chronic anticoagulation with Coumadin with primary adjusting.  INR being adjusted by primary.     (N17.0) Acute kidney failure with tubular necrosis (H)  Comment: Creatinine increased to 1.33 with GFR 50 in October 27, 2022, since then has been under good control and checked every 6 months by primary.      (N40.1,  R33.8) Benign prostatic hyperplasia with urinary retention  Comment: Has a new suprapubic catheter and doing well.    Plan  1.  Watch for recurrent falls and if he has significant falls will need to discontinue Coumadin.  2.  Every 6-month renal profile and address if abnormalities arise.  3.  Monitor blood pressure and if elevated will need to increase hydralazine, will tolerate systolics up to about 150 given his age.  4.  Follow-up me in  1 year or sooner if needed.    Subjective  CC: 89-year-old white gentleman here for a 6-month follow-up although has been about 10 months. He is here with one of his 3 sons. Still lives alone in senior housing apartment.  Gave up driving.  Had a suprapubic catheter placed.  Complains of numbness involving the tips of his fingers but otherwise no syncope, presyncope, chest pains, palpitations, shortness of breath, PND, orthopnea or peripheral edema.    Medications  Current Outpatient Medications   Medication Sig Dispense Refill    acetaminophen (TYLENOL) 500 MG tablet Take 2 tablets by mouth every 6 hours as needed for mild pain      bumetanide (BUMEX) 1 MG tablet Take 1 tablet (1 mg) by mouth 2 times daily 30 tablet 0    Cholecalciferol (VITAMIN D3 PO) Take 1,000 Units by mouth daily      hydrALAZINE (APRESOLINE) 50 MG tablet Take 0.5 tablets (25 mg) by mouth 3 times daily 90 tablet 0    primidone (MYSOLINE) 50 MG tablet Take 50 mg by mouth 3 times daily      propranolol (INDERAL) 20 MG tablet Take 20 mg by mouth 2 times daily      simvastatin (ZOCOR) 40 MG tablet Take 1 tablet by mouth at bedtime      spironolactone (ALDACTONE) 25 MG tablet Take 25 mg by mouth daily      warfarin ANTICOAGULANT (COUMADIN) 5 MG tablet Take 0.5 tablets (2.5 mg) by mouth daily 30 tablet 0       Objective  BP (!) 150/68 (BP Location: Right arm, Patient Position: Sitting, Cuff Size: Adult Regular)   Pulse 58   Resp 16   Wt 68 kg (150 lb)   SpO2 96%   BMI 22.81 kg/m      General Appearance:    Alert, cooperative, no distress, appears stated age   Head:    Normocephalic, without obvious abnormality, atraumatic   Throat:   Lips, mucosa, and tongue normal; teeth and gums normal   Neck:   Supple, symmetrical, trachea midline, no adenopathy;        thyroid:  No enlargement/tenderness/nodules; no carotid    bruit or JVD   Back:     Symmetric, no curvature, ROM normal, no CVA tenderness   Lungs:     Clear to auscultation bilaterally,  respirations unlabored   Chest wall:    No tenderness or deformity   Heart:  Irregularly irregular S1 and S2 normal, no murmur, rub   or gallop   Abdomen:     Soft, non-tender, bowel sounds active all four quadrants,     no masses, no organomegaly   Extremities:   Normal, atraumatic, no cyanosis or edema   Pulses:   2+ and symmetric all extremities   Skin:   Skin color, texture, turgor normal, no rashes or lesions     Results    Lab Results personally reviewed   Lab Results   Component Value Date    CHOL 123 06/06/2023    CHOL 147 10/06/2022     Lab Results   Component Value Date    HDL 32 (L) 06/06/2023    HDL 47 10/06/2022     No components found for: LDLCALC  Lab Results   Component Value Date    TRIG 106 06/06/2023    TRIG 120 10/06/2022     Lab Results   Component Value Date    WBC 12.3 (H) 02/12/2023    HGB 12.2 (L) 04/13/2023    HCT 37.2 (L) 02/12/2023     04/13/2023     Lab Results   Component Value Date    BUN 16.0 06/06/2023     06/06/2023    CO2 27 06/06/2023             Thank you for allowing me to participate in the care of your patient.      Sincerely,     DONTE SEWELL MD     [facility]  cc:   Donte Sewell MD  [address], [address]  [place] [phone]

## 2023-10-05 NOTE — PROGRESS NOTES
[facility]  Heart Care Clinic Follow-up Note    Assessment & Plan        (I50.33) Acute on chronic diastolic congestive heart failure (H)  (primary encounter diagnosis)  Comment: Acute on chronic in the setting of preserved ejection fraction of 55 to 60%, currently asymptomatic now with Bumex 1 mg by mouth twice a day.  Renal profile being checked every 6 months by primary.    (I10) Benign essential hypertension  Comment: Blood pressure under good control today if anything a little bit on the high side.  Taking hydralazine 12-1/2 mg by mouth 3 times a day, cut that down to twice a day.  Metoprolol 12.5 mg by mouth twice a day, has been changed to propranolol 20 mg twice daily to help with tremors, no longer on alpha blockers.  We will continue to monitor, he gets that checked weekly.  If runs higher we will need to go back to 3 times daily on the hydralazine.    (I48.19) Persistent atrial fibrillation (H)  Comment: Permanent, asymptomatic, not valvular and on chronic anticoagulation with Coumadin with primary adjusting.  INR being adjusted by primary.     (N17.0) Acute kidney failure with tubular necrosis (H)  Comment: Creatinine increased to 1.33 with GFR 50 in October 27, 2022, since then has been under good control and checked every 6 months by primary.      (N40.1,  R33.8) Benign prostatic hyperplasia with urinary retention  Comment: Has a new suprapubic catheter and doing well.    Plan  1.  Watch for recurrent falls and if he has significant falls will need to discontinue Coumadin.  2.  Every 6-month renal profile and address if abnormalities arise.  3.  Monitor blood pressure and if elevated will need to increase hydralazine, will tolerate systolics up to about 150 given his age.  4.  Follow-up me in 1 year or sooner if needed.    Subjective  CC: 89-year-old white gentleman here for a 6-month follow-up although has been about 10 months. He is here with one of his 3 sons. Still lives alone in [facility]  housing apartment.  Gave up driving.  Had a suprapubic catheter placed.  Complains of numbness involving the tips of his fingers but otherwise no syncope, presyncope, chest pains, palpitations, shortness of breath, PND, orthopnea or peripheral edema.    Medications  Current Outpatient Medications   Medication Sig Dispense Refill    acetaminophen (TYLENOL) 500 MG tablet Take 2 tablets by mouth every 6 hours as needed for mild pain      bumetanide (BUMEX) 1 MG tablet Take 1 tablet (1 mg) by mouth 2 times daily 30 tablet 0    Cholecalciferol (VITAMIN D3 PO) Take 1,000 Units by mouth daily      hydrALAZINE (APRESOLINE) 50 MG tablet Take 0.5 tablets (25 mg) by mouth 3 times daily 90 tablet 0    primidone (MYSOLINE) 50 MG tablet Take 50 mg by mouth 3 times daily      propranolol (INDERAL) 20 MG tablet Take 20 mg by mouth 2 times daily      simvastatin (ZOCOR) 40 MG tablet Take 1 tablet by mouth at bedtime      spironolactone (ALDACTONE) 25 MG tablet Take 25 mg by mouth daily      warfarin ANTICOAGULANT (COUMADIN) 5 MG tablet Take 0.5 tablets (2.5 mg) by mouth daily 30 tablet 0       Objective  BP (!) 150/68 (BP Location: Right arm, Patient Position: Sitting, Cuff Size: Adult Regular)   Pulse 58   Resp 16   Wt 68 kg (150 lb)   SpO2 96%   BMI 22.81 kg/m      General Appearance:    Alert, cooperative, no distress, appears stated age   Head:    Normocephalic, without obvious abnormality, atraumatic   Throat:   Lips, mucosa, and tongue normal; teeth and gums normal   Neck:   Supple, symmetrical, trachea midline, no adenopathy;        thyroid:  No enlargement/tenderness/nodules; no carotid    bruit or JVD   Back:     Symmetric, no curvature, ROM normal, no CVA tenderness   Lungs:     Clear to auscultation bilaterally, respirations unlabored   Chest wall:    No tenderness or deformity   Heart:  Irregularly irregular S1 and S2 normal, no murmur, rub   or gallop   Abdomen:     Soft, non-tender, bowel sounds active all four  quadrants,     no masses, no organomegaly   Extremities:   Normal, atraumatic, no cyanosis or edema   Pulses:   2+ and symmetric all extremities   Skin:   Skin color, texture, turgor normal, no rashes or lesions     Results    Lab Results personally reviewed   Lab Results   Component Value Date    CHOL 123 06/06/2023    CHOL 147 10/06/2022     Lab Results   Component Value Date    HDL 32 (L) 06/06/2023    HDL 47 10/06/2022     No components found for: LDLCALC  Lab Results   Component Value Date    TRIG 106 06/06/2023    TRIG 120 10/06/2022     Lab Results   Component Value Date    WBC 12.3 (H) 02/12/2023    HGB 12.2 (L) 04/13/2023    HCT 37.2 (L) 02/12/2023     04/13/2023     Lab Results   Component Value Date    BUN 16.0 06/06/2023     06/06/2023    CO2 27 06/06/2023

## 2023-10-27 NOTE — DISCHARGE INSTRUCTIONS
Doxycycline antibiotic given started on Them.  The metabolism of warfarin.  Support and for you to keep your scheduled INR appointments to make sure your INR is not increasing.  Additionally until your repeat INR appointment take the lower dose of your warfarin and skip the higher 7.5 mg dosing.

## 2023-10-27 NOTE — ED NOTES
"Patient has been brought in by his son today.  Pt is alert and oriented X 3--lives alone in an apartment.  He has had a cough and possible pneumonia for the past 10 days-has been on antibiotics at home.  Pt states that his cough is getting \"better\".  Today his nurse was visiting and he told her that the sputum that he is bringing up is dark in color which is a change from previous.  He was advised to come and get it checked out. Pt is not in any signs of resp distress.   "

## 2023-10-27 NOTE — ED PROVIDER NOTES
EMERGENCY DEPARTMENT ENCOUNTER      NAME: Carlos Rubio  AGE: 89 year old male  YOB: 1930  MRN: [id number]  EVALUATION DATE & TIME: No admission date for patient encounter.    PCP: Kyree Bojorquez    ED PROVIDER: Valente Quarles MD      Chief Complaint   Patient presents with    Cough         FINAL IMPRESSION:  No diagnosis found.      ED COURSE & MEDICAL DECISION MAKING:    Pertinent Labs & Imaging studies reviewed. (See chart for details)  89 year old male presents to the Emergency Department for evaluation of cough with sputum.  Treated with a 5-day course of azithromycin resolving persistent cough now with some black-colored sputum over the past few days.  No fevers at home.  No nausea or vomiting or other systemic signs.  No chest pain or shortness of breath.  Has chronic lower extremity edema but not noticed any worsening of this recently.    On exam here patient is well-appearing in no acute distress.  Is afebrile.  Lungs are clear without any wheezes or rails.  Heart is irregular rhythm but regular rate.  Good peripheral perfusion.  Abdomen soft nontender nondistended, no guarding or rigidity.    We will obtain blood work along with INR and PTT to evaluate for subtherapeutic INR.  If therapeutic low suspicion for pulmonary embolism as patient has no chest pain or shortness of breath at this point in time.  Will obtain chest x-ray as well to evaluate for residual pneumonia.     personally reviewed and interpreted chest x-ray.  Do not see any clear focal pneumonia I do see some possible bibasilar scarring.  Labs reviewed and interpreted myself.  Mild anemia with hemoglobin 12.7 but this appears stable compared to prior.  No leukocytosis.  Chemistry is reassuring.  INR slightly supratherapeutic at 3.34.  Given supratherapeutic INR and lack of hypoxia or significant tachycardia have a low suspicion for pulmonary embolism as cause of the patient's symptoms.    On reevaluation patient remained stable  and has no O2 requirement.  Lungs are clear without any wheezes or rails or increased work of breathing.  Possible patient has some residual bronchitis related to his initial respiratory tract infection.  However, given age and comorbidities and the gets reasonable to broaden and double cover for community-acquired pneumonia given his persistent cough.  Will cover with Augmentin as well as doxycycline.  Patient is taking warfarin discussed need to decrease his warfarin dosing while taking doxycycline.  Scheduled for repeat INR next week.  Otherwise patient and son feel comfortable with discharge home.  Should return to the emergency department develops significantly increased work of breathing, persistent fevers, inability tolerate oral medication or other new concerning symptoms.  Patient discharged stable condition.         Medical Decision Making    History:  Supplemental history from: Documented in chart, if applicable  External Record(s) reviewed: Documented in chart, if applicable.    Work Up:  Chart documentation includes differential considered and any EKGs or imaging independently interpreted by provider, where specified.  In additional to work up documented, I considered the following work up: Documented in chart, if applicable.    External consultation:  Discussion of management with another provider: Documented in chart, if applicable    Complicating factors:  Care impacted by chronic illness: Anticoagulated State  Care affected by social determinants of health: N/A    Disposition considerations: Discharge. I prescribed additional prescription strength medication(s) as charted. See documentation for any additional details.       At the conclusion of the encounter I discussed the results of all of the tests and the disposition. The questions were answered. The patient or family acknowledged understanding and was agreeable with the care plan.     0 minutes of critical care time     MEDICATIONS GIVEN IN THE  EMERGENCY:  Medications - No data to display    NEW PRESCRIPTIONS STARTED AT TODAY'S ER VISIT  New Prescriptions    No medications on file          =================================================================    HPI    Patient information was obtained from: Patient    Patient is a 89-year-old male with history of atrial fibrillation on warfarin, CHF, hypertension type 2 diabetes presents emergency department for evaluation of persistent cough with sputum that is occasionally black in color.  Patient was initially diagnosed with community-acquired pneumonia on the 17th and given a Z-Aristeo.  Finished antibiotic course about 4 days ago but still having persistent cough.  Over the past few days has had rectal sputum with been black in color.  Has not noticed any gamaliel blood.  No associated chest pain.  He denies any shortness of breath.  No lightheadedness or dizziness.  No abdominal pain nausea or vomiting.  No fevers at home.  Has a chronic indwelling Sanders but has not noticed any changes in urine output or color.  Has had chronic bilateral lower extremity edema but no worsening of this recently.  Denies any history of blood clots.  Has had some issues in the past with his INR  fluctuating outside his goal.    REVIEW OF SYSTEMS   Refer to the [facility]    PAST MEDICAL HISTORY:  Past Medical History:   Diagnosis Date    A-fib (H)     on coumadin    ARF (acute renal failure) (H24) 01/01/1979    States both his kidneys quit. Was going to start dialysis when they started working again.    Arthritis     Atrial fibrillation (H)     Basal cell carcinoma     Carpal tunnel syndrome     Congestive heart failure (H)     Diabetes (H)     MUSA SAID HE DOES NOT HAVE DIABETES 12/11/15.    Heart failure (H)     HTN (hypertension)     Psoriasis     Psoriatic arthritis (H)     Sicca syndrome (H24)     MUSA SAID HE DOES NOT HAVE/NEVER HAD SICCA SYNDROME 12/11/15.       PAST SURGICAL HISTORY:  Past Surgical History:   Procedure  "Laterality Date    ARTHROSCOPY KNEE      IR MISCELLANEOUS PROCEDURE  10/27/2008    IR MISCELLANEOUS PROCEDURE  10/27/2008    IR SUPRAPUBIC CATHETER CHANGE  2023    IR SUPRAPUBIC CATHETER PLACMENT  2023    RELEASE CARPAL TUNNEL Left            CURRENT MEDICATIONS:    acetaminophen (TYLENOL) 500 MG tablet  bumetanide (BUMEX) 1 MG tablet  Cholecalciferol (VITAMIN D3 PO)  hydrALAZINE (APRESOLINE) 50 MG tablet  primidone (MYSOLINE) 50 MG tablet  propranolol (INDERAL) 20 MG tablet  simvastatin (ZOCOR) 40 MG tablet  spironolactone (ALDACTONE) 25 MG tablet  warfarin ANTICOAGULANT (COUMADIN) 5 MG tablet        ALLERGIES:  Allergies   Allergen Reactions    Bactrim [Sulfamethoxazole-Trimethoprim]        FAMILY HISTORY:  Family History   Problem Relation Age of Onset    Cerebrovascular Disease Mother 76.00    Cerebrovascular Disease Father 81.00    Pacemaker Brother 82.00       SOCIAL HISTORY:   Social History     Socioeconomic History    Marital status:    Tobacco Use    Smoking status: Former     Packs/day: 0.50     Years: 20.00     Additional pack years: 0.00     Total pack years: 10.00     Types: Cigarettes     Quit date: 1975     Years since quittin.8    Smokeless tobacco: Never    Tobacco comments:     Smoked a pipe from  to about  (or maybe  - he can't remember)   Substance and Sexual Activity    Alcohol use: Yes     Alcohol/week: 1.0 standard drink of alcohol     Comment: Alcoholic Drinks/day: Occasionally    Drug use: No       VITALS:  BP (!) 156/74   Pulse 77   Temp 98.4  F (36.9  C) (Tympanic)   Resp 12   Ht 1.753 m (5' 9\")   Wt 70.3 kg (155 lb)   SpO2 94%   BMI 22.89 kg/m      PHYSICAL EXAM    Constitutional: Well developed, Well nourished, NAD,  HENT: Normocephalic, Atraumatic,  mucous membranes moist,   Neck- trachea midline, No stridor.    Eyes:EOMI, Conjunctiva normal, No discharge.   Respiratory: Normal breath sounds, No respiratory distress, No wheezing.  No " rales  Cardiovascular: Normal heart rate, Regular rhythm,  No murmurs,   Abdominal: Soft, No tenderness, No rebound or guarding.     Musculoskeletal: no deformity or malalignment   Integument: Warm, Dry, No erythema, No rash.   Neurologic: Alert & oriented x 3   Psychiatric: Affect normal, Cooperative.      LAB:  All pertinent labs reviewed and interpreted.       RADIOLOGY:  Reviewed all pertinent imaging. Please see official radiology report.  XR Chest 2 Views    (Results Pending)       EKG:        PROCEDURES:         [employer] Documentation:   CMS Diagnoses:                Valente Quarles MD  [facility] EMERGENCY DEPARTMENT  [facility] [facility] [id number]  [phone]      Valente Quarles MD  10/27/23 [id number]

## 2023-10-27 NOTE — ED TRIAGE NOTES
Pt had a cough with blackish/brown sputum on 10/17. At that time , his cxr had a spot on it that may have had pneumonia.  Pt was started on zithromax.  The sputum improved with the zithromax but then went  back to brown sputum after he  finished the antibiotic. He has continued to have a cough and dark sputum. Pt denies feeling weak or ill though. Is here with his son. Sats 97%.         LR @125

## 2024-01-01 ENCOUNTER — APPOINTMENT (OUTPATIENT)
Dept: RADIOLOGY | Facility: HOSPITAL | Age: 89
End: 2024-01-01
Payer: COMMERCIAL

## 2024-01-01 ENCOUNTER — HOSPITAL ENCOUNTER (EMERGENCY)
Facility: HOSPITAL | Age: 89
Discharge: HOME | End: 2024-02-28
Attending: FAMILY MEDICINE | Admitting: FAMILY MEDICINE
Payer: COMMERCIAL

## 2024-01-01 ENCOUNTER — LAB REQUISITION (OUTPATIENT)
Dept: LAB | Facility: CLINIC | Age: 89
End: 2024-01-01

## 2024-01-01 ENCOUNTER — APPOINTMENT (OUTPATIENT)
Dept: ULTRASOUND IMAGING | Facility: HOSPITAL | Age: 89
End: 2024-01-01
Payer: COMMERCIAL

## 2024-01-01 VITALS
DIASTOLIC BLOOD PRESSURE: 69 MMHG | HEART RATE: 61 BPM | OXYGEN SATURATION: 96 % | RESPIRATION RATE: 20 BRPM | SYSTOLIC BLOOD PRESSURE: 149 MMHG | TEMPERATURE: 98.6 F

## 2024-01-01 DIAGNOSIS — S76.311A HAMSTRING MUSCLE STRAIN, RIGHT, INITIAL ENCOUNTER: ICD-10-CM

## 2024-01-01 DIAGNOSIS — I11.0 HYPERTENSIVE HEART DISEASE WITH HEART FAILURE (H): ICD-10-CM

## 2024-01-01 LAB
ANION GAP SERPL CALCULATED.3IONS-SCNC: 11 MMOL/L (ref 7–15)
ANION GAP SERPL CALCULATED.3IONS-SCNC: 9 MMOL/L (ref 7–15)
BUN SERPL-MCNC: 17.5 MG/DL (ref 8–23)
BUN SERPL-MCNC: 20.2 MG/DL (ref 8–23)
CALCIUM SERPL-MCNC: 9 MG/DL (ref 8.2–9.6)
CALCIUM SERPL-MCNC: 9 MG/DL (ref 8.2–9.6)
CHLORIDE SERPL-SCNC: 100 MMOL/L (ref 98–107)
CHLORIDE SERPL-SCNC: 101 MMOL/L (ref 98–107)
CK SERPL-CCNC: 66 U/L (ref 39–308)
CREAT SERPL-MCNC: 1.01 MG/DL (ref 0.67–1.17)
CREAT SERPL-MCNC: 1.04 MG/DL (ref 0.67–1.17)
DEPRECATED HCO3 PLAS-SCNC: 27 MMOL/L (ref 22–29)
DEPRECATED HCO3 PLAS-SCNC: 30 MMOL/L (ref 22–29)
EGFRCR SERPLBLD CKD-EPI 2021: 67 ML/MIN/1.73M2
EGFRCR SERPLBLD CKD-EPI 2021: 69 ML/MIN/1.73M2
ERYTHROCYTE [DISTWIDTH] IN BLOOD BY AUTOMATED COUNT: 14.9 % (ref 10–15)
GLUCOSE SERPL-MCNC: 70 MG/DL (ref 70–99)
GLUCOSE SERPL-MCNC: 89 MG/DL (ref 70–99)
HCT VFR BLD AUTO: 41.5 % (ref 40–53)
HGB BLD-MCNC: 12.9 G/DL (ref 13.3–17.7)
INR PPP: 2.98 (ref 0.85–1.15)
MCH RBC QN AUTO: 30.6 PG (ref 26.5–33)
MCHC RBC AUTO-ENTMCNC: 31.1 G/DL (ref 31.5–36.5)
MCV RBC AUTO: 99 FL (ref 78–100)
PLATELET # BLD AUTO: 282 10E3/UL (ref 150–450)
POTASSIUM SERPL-SCNC: 3.9 MMOL/L (ref 3.4–5.3)
POTASSIUM SERPL-SCNC: 4.2 MMOL/L (ref 3.4–5.3)
RBC # BLD AUTO: 4.21 10E6/UL (ref 4.4–5.9)
SODIUM SERPL-SCNC: 139 MMOL/L (ref 135–145)
SODIUM SERPL-SCNC: 139 MMOL/L (ref 135–145)
WBC # BLD AUTO: 9.1 10E3/UL (ref 4–11)

## 2024-01-01 PROCEDURE — 72170 X-RAY EXAM OF PELVIS: CPT

## 2024-01-01 PROCEDURE — 82550 ASSAY OF CK (CPK): CPT

## 2024-01-01 PROCEDURE — 80048 BASIC METABOLIC PNL TOTAL CA: CPT | Performed by: FAMILY MEDICINE

## 2024-01-01 PROCEDURE — 80048 BASIC METABOLIC PNL TOTAL CA: CPT

## 2024-01-01 PROCEDURE — 85027 COMPLETE CBC AUTOMATED: CPT

## 2024-01-01 PROCEDURE — 99284 EMERGENCY DEPT VISIT MOD MDM: CPT | Mod: 25

## 2024-01-01 PROCEDURE — 73562 X-RAY EXAM OF KNEE 3: CPT | Mod: RT

## 2024-01-01 PROCEDURE — 93971 EXTREMITY STUDY: CPT | Mod: RT

## 2024-01-01 PROCEDURE — 36415 COLL VENOUS BLD VENIPUNCTURE: CPT

## 2024-01-01 PROCEDURE — 85610 PROTHROMBIN TIME: CPT

## 2024-01-01 PROCEDURE — 73552 X-RAY EXAM OF FEMUR 2/>: CPT | Mod: RT

## 2024-01-01 PROCEDURE — 250N000013 HC RX MED GY IP 250 OP 250 PS 637

## 2024-01-01 RX ORDER — ACETAMINOPHEN 325 MG/1
TABLET ORAL
Status: DISCONTINUED
Start: 2024-01-01 | End: 2024-01-01

## 2024-01-01 RX ORDER — ACETAMINOPHEN 325 MG/1
975 TABLET ORAL ONCE
Status: COMPLETED | OUTPATIENT
Start: 2024-01-01 | End: 2024-01-01

## 2024-01-01 RX ADMIN — ACETAMINOPHEN 975 MG: 325 TABLET ORAL at 18:13

## 2024-01-01 ASSESSMENT — COLUMBIA-SUICIDE SEVERITY RATING SCALE - C-SSRS
1. IN THE PAST MONTH, HAVE YOU WISHED YOU WERE DEAD OR WISHED YOU COULD GO TO SLEEP AND NOT WAKE UP?: NO
2. HAVE YOU ACTUALLY HAD ANY THOUGHTS OF KILLING YOURSELF IN THE PAST MONTH?: NO
6. HAVE YOU EVER DONE ANYTHING, STARTED TO DO ANYTHING, OR PREPARED TO DO ANYTHING TO END YOUR LIFE?: NO

## 2024-01-01 ASSESSMENT — ACTIVITIES OF DAILY LIVING (ADL)
ADLS_ACUITY_SCORE: 38

## 2024-02-28 PROBLEM — M12.9 ARTHROPATHY: Status: ACTIVE | Noted: 2024-01-01

## 2024-02-28 PROBLEM — M89.9 DISORDER OF BONE AND CARTILAGE: Status: ACTIVE | Noted: 2024-01-01

## 2024-02-28 PROBLEM — M54.2 NECK PAIN: Status: ACTIVE | Noted: 2024-01-01

## 2024-02-28 PROBLEM — M25.50 POLYARTHRALGIA: Status: ACTIVE | Noted: 2017-06-06

## 2024-02-28 PROBLEM — I77.74 DISSECTION OF VERTEBRAL ARTERY (H): Status: ACTIVE | Noted: 2024-01-01

## 2024-02-28 PROBLEM — I67.89 ACUTE, BUT ILL-DEFINED, CEREBROVASCULAR DISEASE: Status: ACTIVE | Noted: 2024-01-01

## 2024-02-28 PROBLEM — J32.9 CHRONIC SINUSITIS: Status: ACTIVE | Noted: 2024-01-01

## 2024-02-28 PROBLEM — N17.9 ACUTE KIDNEY INJURY (H): Status: ACTIVE | Noted: 2022-11-06

## 2024-02-28 PROBLEM — R33.9 RETENTION OF URINE: Status: ACTIVE | Noted: 2022-11-06

## 2024-02-28 PROBLEM — M15.0 PRIMARY OSTEOARTHRITIS INVOLVING MULTIPLE JOINTS: Status: ACTIVE | Noted: 2017-06-06

## 2024-02-28 PROBLEM — R51.9 HEADACHE: Status: ACTIVE | Noted: 2024-01-01

## 2024-02-28 PROBLEM — E78.5 HYPERLIPIDEMIA: Status: ACTIVE | Noted: 2022-11-06

## 2024-02-28 PROBLEM — M94.9 DISORDER OF BONE AND CARTILAGE: Status: ACTIVE | Noted: 2024-01-01

## 2024-02-28 PROBLEM — G56.00 CARPAL TUNNEL SYNDROME: Status: ACTIVE | Noted: 2024-01-01

## 2024-02-28 NOTE — ED PROVIDER NOTES
Emergency Department Encounter  [facility] EMERGENCY DEPARTMENT  Carlos Rubio   AGE: 89 year old SEX: male  YOB: 1930  MRN: [id number]      EVALUATION DATE & TIME: 2/28/2024  4:31 PM ; PCP: Kyree Bojorquez   ED PROVIDER: Leslie Burgess PA-C    CHIEF COMPLAINT: Leg Pain      MEDICAL DECISION MAKING   Carlos Rubio is a 89 year old male with a pertinent history of hypertension, hyperlipidemia, congestive heart failure, atrial fibrillation anticoagulated on warfarin, and diabetes who presents to the ED for evaluation of leg pain that started 3 days ago.  No falls or recent trauma to the area.  Pain has remained stable despite acetaminophen and rest and is now making standing and walking difficult.  No fevers, chills, shortness of breath, or chest pain.    Vitals reviewed and hemodynamically stable, afebrile.  On exam, patient is well-appearing and in no acute distress.  There is direct pain with palpation of the hamstring muscles, more specifically the semitendinosis muscle.  Patient maintains full range of motion of right hip, right knee, and right ankle. No hot, painful, swollen joints.  Right distal extremity is warm with pulses intact.    Patient presentation is most consistent with muscular strain of the right hamstring. CBC is without evidence of leukocytosis with stable hemoglobin (12.9).  BMP is without emergent electrolyte disturbance.  CK WNL.  INR 2.98, therapeutic, doubt hematoma  Plain film imaging of right hip, right femur, and right knee are reassuring with no evidence of fracture or malalignment.  Right lower extremity duplex ultrasound negative for DVT.  Based on physical exam and evaluation today, I have low suspicion for septic arthritis, Achilles tendon rupture, compartment syndrome, gastrocnemius strain, superficial thrombophlebitis, and cellulitis or abscess.    Patient reports improved pain level prior to discharge. Plan to discharge home with  symptomatic care including rest, ice, elevation and OTC analgesics. Patient was provided with clear, written instructions of potential danger signs and where and when to return for emergency care or re-evaluation.       Medical Decision Making  Obtained supplemental history:Supplemental history obtained?: Documented in chart and Family Member/Significant Other  Reviewed external records: External records reviewed?: Documented in chart  Care impacted by chronic illness:Anticoagulated State, Diabetes, Heart Disease, Hyperlipidemia, and Hypertension  Care significantly affected by social determinants of health:N/A  Did you consider but not order tests?: Work up considered but not performed and documented in chart, if applicable  Did you interpret images independently?: Independent interpretation of ECG and images noted in documentation, when applicable.  Consultation discussion with other provider:Did you involve another provider (consultant, , pharmacy, etc.)?: I discussed the care with another health care provider, see documentation for details.  Discharge. I discussed a prescription for muscle relaxer, but deferred after shared decision making discussion.. I considered admission, but ultimately discharged patient no DVT on US, therapeutic INR.    FINAL IMPRESSION       ICD-10-CM    1. Hamstring muscle strain, right, initial encounter  S76.311A           ED COURSE   4:45 PM  I met and introduced myself to the patient. I gathered initial history and we discussed plan for initial workup.   6:44 PM I have staffed the patient with Dr. Lawson Gonzalez, ED MD, who has evaluated the patient and agrees with all aspects of today's care.   6:55 PM I reassessed the patient and discussed results so far and plan for blood test.  7:24 PM I rechecked the patient and discussed results, discharge, follow up, and reasons to return to the ED.     MEDICATIONS GIVEN IN THE EMERGENCY DEPARTMENT:   Medications   acetaminophen (TYLENOL)  tablet 975 mg (975 mg Oral $Given 2/28/24 1813)      NEW PRESCRIPTIONS STARTED AT TODAY'S ED VISIT:  Discharge Medication List as of 2/28/2024  7:27 PM        =================================================================     HISTORY OF PRESENT ILLNESS   Patient information was obtained from: Patient, son  Use of Intrepreter: N/A     Carlos Rubio is a 89 year old male with a pertinent history of hypertension, per lipidemia, congestive heart failure, atrial fibrillation anticoagulated on warfarin, and diabetes who presents to the ED by private vehicle with son for evaluation of leg pain.  Pain is localized to the back of the right thigh has been worsening now making standing/walking difficult.  No recent falls or trauma to the area.  Patient denies fever, chills, shortness of breath, chest pain, nausea, vomiting, diarrhea.    Per chart review,  2/28/2024 - Patient seen today for INR monitoring.  INR 2.6.  10/27/2023 - Patient was seen in this emergency department for evaluation of cough and sputum.  Patient was diagnosed with pneumonia and prescribed Augmentin and doxycycline.    MEDICAL HISTORY     Past Medical History:   Diagnosis Date    A-fib (H)     ARF (acute renal failure) (H24) 01/01/1979    Arthritis     Atrial fibrillation (H)     Basal cell carcinoma     Carpal tunnel syndrome     Congestive heart failure (H)     Diabetes (H)     Heart failure (H)     HTN (hypertension)     Psoriasis     Psoriatic arthritis (H)     Sicca syndrome (H24)        Past Surgical History:   Procedure Laterality Date    ARTHROSCOPY KNEE      IR MISCELLANEOUS PROCEDURE  10/27/2008    IR MISCELLANEOUS PROCEDURE  10/27/2008    IR SUPRAPUBIC CATHETER CHANGE  5/25/2023    IR SUPRAPUBIC CATHETER PLACMENT  4/13/2023    RELEASE CARPAL TUNNEL Left        Family History   Problem Relation Age of Onset    Cerebrovascular Disease Mother 76.00    Cerebrovascular Disease Father 81.00    Pacemaker Brother 82.00       Social History      Tobacco Use    Smoking status: Former     Packs/day: 0.50     Years: 20.00     Additional pack years: 0.00     Total pack years: 10.00     Types: Cigarettes     Quit date: 1975     Years since quittin.1    Smokeless tobacco: Never    Tobacco comments:     Smoked a pipe from  to about  (or maybe  - he can't remember)   Substance Use Topics    Alcohol use: Yes     Alcohol/week: 1.0 standard drink of alcohol     Comment: Alcoholic Drinks/day: Occasionally    Drug use: No       acetaminophen (TYLENOL) 500 MG tablet  bumetanide (BUMEX) 1 MG tablet  Cholecalciferol (VITAMIN D3 PO)  hydrALAZINE (APRESOLINE) 50 MG tablet  primidone (MYSOLINE) 50 MG tablet  propranolol (INDERAL) 20 MG tablet  simvastatin (ZOCOR) 40 MG tablet  spironolactone (ALDACTONE) 25 MG tablet  warfarin ANTICOAGULANT (COUMADIN) 5 MG tablet        PHYSICAL EXAM   VITALS:  Patient Vitals for the past 24 hrs:   BP Temp Temp src Pulse Resp SpO2   24 1934 (!) 149/69 -- -- 61 -- 96 %   24 190 -- -- -- -- 20 --   24 1609 137/65 98.6  F (37  C) Temporal 59 20 93 %       Physical Exam  Vitals and nursing note reviewed.   Constitutional:       General: He is not in acute distress.     Appearance: He is not ill-appearing or toxic-appearing.   Cardiovascular:      Rate and Rhythm: Normal rate.   Pulmonary:      Effort: Pulmonary effort is normal.   Musculoskeletal:      Right hip: No bony tenderness. Normal range of motion.      Right upper leg: Tenderness (tenderness with palpation of semitendinosis muscle) present. No deformity or bony tenderness.      Right knee: No swelling, deformity or bony tenderness. Normal range of motion. Normal alignment.      Right lower leg: No deformity, tenderness or bony tenderness. 2+ Pitting Edema present.      Left lower le+ Pitting Edema present.      Right ankle: No tenderness. Normal range of motion. Normal pulse.      Right Achilles Tendon: No tenderness or defects.      Right  foot: Normal capillary refill. No bony tenderness. Normal pulse.       LABS AND IMAGING   All pertinent labs reviewed and interpreted  Labs Ordered and Resulted from Time of ED Arrival to Time of ED Departure   CBC WITH PLATELETS - Abnormal       Result Value    WBC Count 9.1      RBC Count 4.21 (*)     Hemoglobin 12.9 (*)     Hematocrit 41.5      MCV 99      MCH 30.6      MCHC 31.1 (*)     RDW 14.9      Platelet Count 282     INR - Abnormal    INR 2.98 (*)    BASIC METABOLIC PANEL - Normal    Sodium 139      Potassium 3.9      Chloride 101      Carbon Dioxide (CO2) 27      Anion Gap 11      Urea Nitrogen 20.2      Creatinine 1.04      GFR Estimate 67      Calcium 9.0      Glucose 89     CK TOTAL - Normal    CK 66         XR Knee Right 3 Views   Final Result   IMPRESSION:       1.  Heterogeneous sclerosis throughout the bilateral parasymphyseal pubic bodies, and extending along the right superior and inferior pubic rami into the periacetabular region. Differential considerations include metastatic malignancy and postradiation    AVN. No comparison currently available to evaluate the acuity of this finding.       2.  Negative for acute fracture. No femoral fracture or femoral bone lesion.       3.  Bones are diffusely demineralized.       4.  Normal hip joint alignment. Minimal degenerative arthritic changes in the hips. Normal knee joint alignment and spacing. Moderate-advanced degenerative changes in the lower lumbar spine.      XR Pelvis 1/2 Views   Final Result   IMPRESSION:       1.  Heterogeneous sclerosis throughout the bilateral parasymphyseal pubic bodies, and extending along the right superior and inferior pubic rami into the periacetabular region. Differential considerations include metastatic malignancy and postradiation    AVN. No comparison currently available to evaluate the acuity of this finding.       2.  Negative for acute fracture. No femoral fracture or femoral bone lesion.       3.  Bones are  diffusely demineralized.       4.  Normal hip joint alignment. Minimal degenerative arthritic changes in the hips. Normal knee joint alignment and spacing. Moderate-advanced degenerative changes in the lower lumbar spine.      XR Femur Right 2 Views   Final Result   IMPRESSION:       1.  Heterogeneous sclerosis throughout the bilateral parasymphyseal pubic bodies, and extending along the right superior and inferior pubic rami into the periacetabular region. Differential considerations include metastatic malignancy and postradiation    AVN. No comparison currently available to evaluate the acuity of this finding.       2.  Negative for acute fracture. No femoral fracture or femoral bone lesion.       3.  Bones are diffusely demineralized.       4.  Normal hip joint alignment. Minimal degenerative arthritic changes in the hips. Normal knee joint alignment and spacing. Moderate-advanced degenerative changes in the lower lumbar spine.      US Lower Extremity Venous Duplex Right   Final Result   IMPRESSION:   1.  No deep venous thrombosis in the right lower extremity.   2.  Small knee joint effusion.           Leslie Burgess PA-C   Emergency Medicine   [facility] EMERGENCY DEPARTMENT  [facility] [facility] [address]  [phone]  Dept: [phone]      Leslie Burgess PA-C  02/28/24 1954

## 2024-02-28 NOTE — ED TRIAGE NOTES
Pt states right leg pain that started 2 days ago.  Pt states it's bad enough to make standing/walking difficult.  Pt denies any new shortness of breath or chest pain.  Pt denies any trauma to right leg.

## 2024-02-29 NOTE — ED PROVIDER NOTES
Emergency Department Midlevel Supervisory Note      I had a face to face encounter with this patient seen by the Advanced Practice Provider (STAN). I personally made/approved the management plan and take responsibility for the patient management. I personally saw patient and performed a substantive portion of the visit including all aspects of the medical decision making.     ED Course:  6:39 PM  Leslie Burgess PA-C staffed patient with me. I agree with their assessment and plan of management, and I will see the patient. I met with the patient to introduce myself, gather additional history, perform my initial exam, and discuss the plan.     MDM:  Patient presents with right posterior leg pain without history of trauma, is anticoagulated.  Pain with standing and walking.  On exam he has marked tenderness of the posterior right thigh with palpable mass consistent with hematoma.  Ultrasound negative for DVT, discussed symptom management and patient stable for discharge.       1. Hamstring muscle strain, right, initial encounter        Brief HPI:     Carlos Rubio is a 89 year old male who presents for evaluation of right posterior thigh leg pain that is worsening making walking and standing difficult. Denies trauma to the area. Is on Warfarin.     I, Janie Sidhu, am serving as a scribe to document services personally performed by Lawson Gonzalez MD based on my observations and the provider's statements to me.   I, Lawson Gonzalez MD, attest that Janie Sidhu was acting in a scribe capacity, has observed my performance of the services and has documented them in accordance with my direction.    Brief Physical Exam:  Physical Exam  Vitals and nursing note reviewed.   Constitutional:       Appearance: Normal appearance.   HENT:      Head: Normocephalic and atraumatic.      Right Ear: External ear normal.      Left Ear: External ear normal.      Nose: Nose normal.   Eyes:      Extraocular Movements: Extraocular  movements intact.      Conjunctiva/sclera: Conjunctivae normal.      Pupils: Pupils are equal, round, and reactive to light.   Pulmonary:      Effort: Pulmonary effort is normal.   Musculoskeletal:         General: No swelling or deformity. Normal range of motion.      Cervical back: Normal range of motion.   Neurological:      General: No focal deficit present.      Mental Status: He is alert and oriented to person, place, and time. Mental status is at baseline.   Psychiatric:         Mood and Affect: Mood normal.         Behavior: Behavior normal.         Thought Content: Thought content normal.              Labs and Imaging:  Results for orders placed or performed during the hospital encounter of 02/28/24   US Lower Extremity Venous Duplex Right    Impression    IMPRESSION:  1.  No deep venous thrombosis in the right lower extremity.  2.  Small knee joint effusion.    XR Femur Right 2 Views    Impression    IMPRESSION:     1.  Heterogeneous sclerosis throughout the bilateral parasymphyseal pubic bodies, and extending along the right superior and inferior pubic rami into the periacetabular region. Differential considerations include metastatic malignancy and postradiation   AVN. No comparison currently available to evaluate the acuity of this finding.     2.  Negative for acute fracture. No femoral fracture or femoral bone lesion.     3.  Bones are diffusely demineralized.     4.  Normal hip joint alignment. Minimal degenerative arthritic changes in the hips. Normal knee joint alignment and spacing. Moderate-advanced degenerative changes in the lower lumbar spine.   XR Pelvis 1/2 Views    Impression    IMPRESSION:     1.  Heterogeneous sclerosis throughout the bilateral parasymphyseal pubic bodies, and extending along the right superior and inferior pubic rami into the periacetabular region. Differential considerations include metastatic malignancy and postradiation   AVN. No comparison currently available to  evaluate the acuity of this finding.     2.  Negative for acute fracture. No femoral fracture or femoral bone lesion.     3.  Bones are diffusely demineralized.     4.  Normal hip joint alignment. Minimal degenerative arthritic changes in the hips. Normal knee joint alignment and spacing. Moderate-advanced degenerative changes in the lower lumbar spine.   XR Knee Right 3 Views    Impression    IMPRESSION:     1.  Heterogeneous sclerosis throughout the bilateral parasymphyseal pubic bodies, and extending along the right superior and inferior pubic rami into the periacetabular region. Differential considerations include metastatic malignancy and postradiation   AVN. No comparison currently available to evaluate the acuity of this finding.     2.  Negative for acute fracture. No femoral fracture or femoral bone lesion.     3.  Bones are diffusely demineralized.     4.  Normal hip joint alignment. Minimal degenerative arthritic changes in the hips. Normal knee joint alignment and spacing. Moderate-advanced degenerative changes in the lower lumbar spine.   CBC (+ platelets, no diff)   Result Value Ref Range    WBC Count 9.1 4.0 - 11.0 10e3/uL    RBC Count 4.21 (L) 4.40 - 5.90 10e6/uL    Hemoglobin 12.9 (L) 13.3 - 17.7 g/dL    Hematocrit 41.5 40.0 - 53.0 %    MCV 99 78 - 100 fL    MCH 30.6 26.5 - 33.0 pg    MCHC 31.1 (L) 31.5 - 36.5 g/dL    RDW 14.9 10.0 - 15.0 %    Platelet Count 282 150 - 450 10e3/uL   Basic metabolic panel   Result Value Ref Range    Sodium 139 135 - 145 mmol/L    Potassium 3.9 3.4 - 5.3 mmol/L    Chloride 101 98 - 107 mmol/L    Carbon Dioxide (CO2) 27 22 - 29 mmol/L    Anion Gap 11 7 - 15 mmol/L    Urea Nitrogen 20.2 8.0 - 23.0 mg/dL    Creatinine 1.04 0.67 - 1.17 mg/dL    GFR Estimate 67 >60 mL/min/1.73m2    Calcium 9.0 8.2 - 9.6 mg/dL    Glucose 89 70 - 99 mg/dL   Result Value Ref Range    CK 66 39 - 308 U/L   Result Value Ref Range    INR 2.98 (H) 0.85 - 1.15     I independently interpreted the  previous imaging and laboratory study(s):     Lawson Gonzalez MD  [facility] EMERGENCY DEPARTMENT  [facility] [facility] [id number] [phone]     Lawson Gonzalez MD  03/06/24 3686

## 2024-02-29 NOTE — DISCHARGE INSTRUCTIONS
Today you were seen in the emergency department for right leg pain.  Your workup was negative for bony fracture or dislocation.  There is no evidence of a blood clot in your right leg.  Based on your workup and physical exam today, I anticipate your symptoms are due to a muscular strain of your right hamstring.    PAIN MANAGEMENT  Acetaminophen (Tylenol) - 500-1,000 MG every 6 hours    CARE AT HOME: the first 48-72 hours  Avoid activities that cause pain!  Follow the RICE protocol to help speed recovery:  Rest - Avoid putting weight on the painful joint/extremity and avoid activities that worsen the pain.  Ice - Use cold packs for 20 minutes at a time, every two to four hours, particularly after physical activity.   Compression - Lightly wrap the injured area in a soft bandage or ACE wrap until the inflammation dies down. Be sure not to compress the injured area too tightly.  Elevation - Elevate the injury higher than your heart while resting to reduce swelling    FOLLOW UP  If symptoms do not improve within five days, please follow up with [facility] (contact information provided). Tell them you were seen in our department.     If symptoms worsen or you develop any of the following, please do not hesitate to come back to this emergency department for additional evaluation.  Watch closely for changes in your health, and be sure to contact your doctor if:    You have new or worse pain.     You have new symptoms.     You do not get better as expected.

## (undated) RX ORDER — LIDOCAINE HYDROCHLORIDE 10 MG/ML
INJECTION, SOLUTION INFILTRATION; PERINEURAL
Status: DISPENSED
Start: 2023-04-13

## (undated) RX ORDER — ACETAMINOPHEN 325 MG/1
TABLET ORAL
Status: DISPENSED
Start: 2023-04-13

## (undated) RX ORDER — LIDOCAINE HYDROCHLORIDE 10 MG/ML
INJECTION, SOLUTION INFILTRATION; PERINEURAL
Status: DISPENSED
Start: 2023-01-01

## (undated) RX ORDER — CEFTRIAXONE SODIUM 1 G
VIAL (EA) INJECTION
Status: DISPENSED
Start: 2023-04-13

## (undated) RX ORDER — FENTANYL CITRATE 50 UG/ML
INJECTION, SOLUTION INTRAMUSCULAR; INTRAVENOUS
Status: DISPENSED
Start: 2023-04-13